# Patient Record
Sex: FEMALE | Race: WHITE | NOT HISPANIC OR LATINO | Employment: UNEMPLOYED | URBAN - METROPOLITAN AREA
[De-identification: names, ages, dates, MRNs, and addresses within clinical notes are randomized per-mention and may not be internally consistent; named-entity substitution may affect disease eponyms.]

---

## 2020-10-12 ENCOUNTER — OFFICE VISIT (OUTPATIENT)
Dept: ORTHOPEDICS | Facility: CLINIC | Age: 40
End: 2020-10-12
Payer: COMMERCIAL

## 2020-10-12 ENCOUNTER — ANCILLARY PROCEDURE (OUTPATIENT)
Dept: GENERAL RADIOLOGY | Facility: CLINIC | Age: 40
End: 2020-10-12
Attending: PEDIATRICS
Payer: COMMERCIAL

## 2020-10-12 VITALS
HEIGHT: 63 IN | WEIGHT: 110 LBS | DIASTOLIC BLOOD PRESSURE: 87 MMHG | HEART RATE: 105 BPM | BODY MASS INDEX: 19.49 KG/M2 | SYSTOLIC BLOOD PRESSURE: 124 MMHG

## 2020-10-12 DIAGNOSIS — M54.2 NECK PAIN: ICD-10-CM

## 2020-10-12 DIAGNOSIS — G25.89 SCAPULAR DYSKINESIS: ICD-10-CM

## 2020-10-12 DIAGNOSIS — M54.2 NECK PAIN: Primary | ICD-10-CM

## 2020-10-12 DIAGNOSIS — M75.101 ROTATOR CUFF SYNDROME OF RIGHT SHOULDER: ICD-10-CM

## 2020-10-12 PROCEDURE — 99203 OFFICE O/P NEW LOW 30 MIN: CPT | Performed by: PEDIATRICS

## 2020-10-12 PROCEDURE — 72040 X-RAY EXAM NECK SPINE 2-3 VW: CPT | Performed by: RADIOLOGY

## 2020-10-12 ASSESSMENT — MIFFLIN-ST. JEOR: SCORE: 1153.09

## 2020-10-12 NOTE — RESULT ENCOUNTER NOTE
These results were discussed during office visit.    Amairani Abbasi MD, CAQ  Primary Care Sports Medicine  [facility]
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
## 2020-10-12 NOTE — PATIENT INSTRUCTIONS
Plan:  - Today's Plan of Care:  Rehab: Physical Therapy: [facility] - [phone]    -We also discussed other future treatment options:  MRI images of (Neck v. Shoulder)    Follow Up: 6 - 8 weeks    If you have any further questions for your physician or physician s care team you can call [phone] and use option 3 to leave a voice message. Calls received during business hours will be returned same day.

## 2020-10-21 ENCOUNTER — THERAPY VISIT (OUTPATIENT)
Dept: PHYSICAL THERAPY | Facility: CLINIC | Age: 40
End: 2020-10-21
Payer: COMMERCIAL

## 2020-10-21 DIAGNOSIS — G25.89 SCAPULAR DYSKINESIS: ICD-10-CM

## 2020-10-21 DIAGNOSIS — M54.2 NECK PAIN: ICD-10-CM

## 2020-10-21 DIAGNOSIS — M75.101 ROTATOR CUFF SYNDROME OF RIGHT SHOULDER: ICD-10-CM

## 2020-10-21 PROCEDURE — 97035 APP MDLTY 1+ULTRASOUND EA 15: CPT | Mod: GP | Performed by: PHYSICAL THERAPIST

## 2020-10-21 PROCEDURE — 97162 PT EVAL MOD COMPLEX 30 MIN: CPT | Mod: GP | Performed by: PHYSICAL THERAPIST

## 2020-10-21 PROCEDURE — 97535 SELF CARE MNGMENT TRAINING: CPT | Mod: GP | Performed by: PHYSICAL THERAPIST

## 2020-10-21 PROCEDURE — 97110 THERAPEUTIC EXERCISES: CPT | Mod: GP | Performed by: PHYSICAL THERAPIST
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
## 2020-10-28 ENCOUNTER — THERAPY VISIT (OUTPATIENT)
Dept: PHYSICAL THERAPY | Facility: CLINIC | Age: 40
End: 2020-10-28
Payer: COMMERCIAL

## 2020-10-28 DIAGNOSIS — M54.2 NECK PAIN: ICD-10-CM

## 2020-10-28 PROCEDURE — 97035 APP MDLTY 1+ULTRASOUND EA 15: CPT | Mod: GP | Performed by: PHYSICAL THERAPIST

## 2020-10-28 PROCEDURE — 97110 THERAPEUTIC EXERCISES: CPT | Mod: GP | Performed by: PHYSICAL THERAPIST

## 2020-10-28 PROCEDURE — 97112 NEUROMUSCULAR REEDUCATION: CPT | Mod: GP | Performed by: PHYSICAL THERAPIST

## 2020-11-09 ENCOUNTER — THERAPY VISIT (OUTPATIENT)
Dept: PHYSICAL THERAPY | Facility: CLINIC | Age: 40
End: 2020-11-09
Payer: COMMERCIAL

## 2020-11-09 DIAGNOSIS — M54.2 NECK PAIN: ICD-10-CM

## 2020-11-09 PROCEDURE — 97110 THERAPEUTIC EXERCISES: CPT | Mod: GP | Performed by: PHYSICAL THERAPIST

## 2020-11-09 PROCEDURE — 97035 APP MDLTY 1+ULTRASOUND EA 15: CPT | Mod: GP | Performed by: PHYSICAL THERAPIST

## 2020-11-09 PROCEDURE — 97112 NEUROMUSCULAR REEDUCATION: CPT | Mod: GP | Performed by: PHYSICAL THERAPIST

## 2021-01-09 ENCOUNTER — HEALTH MAINTENANCE LETTER (OUTPATIENT)
Age: 41
End: 2021-01-09

## 2021-03-22 PROBLEM — M54.2 NECK PAIN: Status: RESOLVED | Noted: 2020-10-21 | Resolved: 2021-03-22

## 2021-03-22 NOTE — PROGRESS NOTES
Subjective:  HPI  Physical Exam                    Objective:  System    Physical Exam    General     ROS    Assessment/Plan:    DISCHARGE REPORT    Progress reporting period is from 10/21 to 11/09/2020.       SUBJECTIVE   Subjective: Doing better. Also workng with chiro and a massage therapist  .   Changes in function:  None  Adverse reaction to treatment or activity: None    OBJECTIVE    Objective: improved scapular control with right arm elevation and HEP     ASSESSMENT/PLAN  Updated problem list and treatment plan: Diagnosis 1:  Cervical pain  Pain -  self management, education and home program  Decreased ROM/flexibility - therapeutic exercise and home program  Impaired muscle performance - neuro re-education and home program  Decreased function - home program  Impaired posture - home program  STG/LTGs have been met or progress has been made towards goals:  None  Assessment of Progress: The patient has not returned to therapy. Current status is unknown.  Self Management Plans:  Patient has been instructed in a home treatment program.  Patient  has been instructed in self management of symptoms.    Jessica continues to require the following intervention to meet STG and LTG's:  PT intervention is no longer required to meet STG/LTG.    Recommendations:  This patient is ready to be discharged from therapy and continue their home treatment program.    Please refer to the daily flowsheet for treatment today, total treatment time and time spent performing 1:1 timed codes.
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
## 2021-06-03 ENCOUNTER — OFFICE VISIT (OUTPATIENT)
Dept: FAMILY MEDICINE | Facility: CLINIC | Age: 41
End: 2021-06-03
Payer: COMMERCIAL

## 2021-06-03 VITALS
TEMPERATURE: 98 F | HEIGHT: 63 IN | DIASTOLIC BLOOD PRESSURE: 80 MMHG | SYSTOLIC BLOOD PRESSURE: 117 MMHG | HEART RATE: 95 BPM | OXYGEN SATURATION: 100 % | WEIGHT: 108.1 LBS | RESPIRATION RATE: 16 BRPM | BODY MASS INDEX: 19.15 KG/M2

## 2021-06-03 DIAGNOSIS — Z00.00 ROUTINE GENERAL MEDICAL EXAMINATION AT A HEALTH CARE FACILITY: Primary | ICD-10-CM

## 2021-06-03 DIAGNOSIS — R06.83 SNORING: ICD-10-CM

## 2021-06-03 DIAGNOSIS — R53.83 FATIGUE, UNSPECIFIED TYPE: ICD-10-CM

## 2021-06-03 DIAGNOSIS — Z12.4 SCREENING FOR CERVICAL CANCER: ICD-10-CM

## 2021-06-03 DIAGNOSIS — F17.200 SMOKER: ICD-10-CM

## 2021-06-03 DIAGNOSIS — F41.9 ANXIETY: ICD-10-CM

## 2021-06-03 DIAGNOSIS — F32.1 MODERATE MAJOR DEPRESSION (H): ICD-10-CM

## 2021-06-03 LAB
ANION GAP SERPL CALCULATED.3IONS-SCNC: 6 MMOL/L (ref 3–14)
BASOPHILS # BLD AUTO: 0 10E9/L (ref 0–0.2)
BASOPHILS NFR BLD AUTO: 0.9 %
BUN SERPL-MCNC: 9 MG/DL (ref 7–30)
CALCIUM SERPL-MCNC: 9.1 MG/DL (ref 8.5–10.1)
CHLORIDE SERPL-SCNC: 105 MMOL/L (ref 94–109)
CO2 SERPL-SCNC: 27 MMOL/L (ref 20–32)
CREAT SERPL-MCNC: 0.82 MG/DL (ref 0.52–1.04)
DEPRECATED CALCIDIOL+CALCIFEROL SERPL-MC: 47 UG/L (ref 20–75)
DIFFERENTIAL METHOD BLD: NORMAL
EOSINOPHIL # BLD AUTO: 0.2 10E9/L (ref 0–0.7)
EOSINOPHIL NFR BLD AUTO: 3.7 %
ERYTHROCYTE [DISTWIDTH] IN BLOOD BY AUTOMATED COUNT: 12.2 % (ref 10–15)
FERRITIN SERPL-MCNC: 20 NG/ML (ref 12–150)
GFR SERPL CREATININE-BSD FRML MDRD: >90 ML/MIN/{1.73_M2}
GLUCOSE SERPL-MCNC: 93 MG/DL (ref 70–99)
HCT VFR BLD AUTO: 40.5 % (ref 35–47)
HGB BLD-MCNC: 13.6 G/DL (ref 11.7–15.7)
LYMPHOCYTES # BLD AUTO: 0.9 10E9/L (ref 0.8–5.3)
LYMPHOCYTES NFR BLD AUTO: 21.6 %
MCH RBC QN AUTO: 29.6 PG (ref 26.5–33)
MCHC RBC AUTO-ENTMCNC: 33.6 G/DL (ref 31.5–36.5)
MCV RBC AUTO: 88 FL (ref 78–100)
MONOCYTES # BLD AUTO: 0.4 10E9/L (ref 0–1.3)
MONOCYTES NFR BLD AUTO: 8.1 %
NEUTROPHILS # BLD AUTO: 2.8 10E9/L (ref 1.6–8.3)
NEUTROPHILS NFR BLD AUTO: 65.7 %
PLATELET # BLD AUTO: 204 10E9/L (ref 150–450)
POTASSIUM SERPL-SCNC: 3.6 MMOL/L (ref 3.4–5.3)
RBC # BLD AUTO: 4.59 10E12/L (ref 3.8–5.2)
SODIUM SERPL-SCNC: 138 MMOL/L (ref 133–144)
TSH SERPL DL<=0.005 MIU/L-ACNC: 1.35 MU/L (ref 0.4–4)
VIT B12 SERPL-MCNC: 424 PG/ML (ref 193–986)
WBC # BLD AUTO: 4.3 10E9/L (ref 4–11)

## 2021-06-03 PROCEDURE — 82306 VITAMIN D 25 HYDROXY: CPT | Performed by: FAMILY MEDICINE

## 2021-06-03 PROCEDURE — 82728 ASSAY OF FERRITIN: CPT | Performed by: FAMILY MEDICINE

## 2021-06-03 PROCEDURE — 82607 VITAMIN B-12: CPT | Performed by: FAMILY MEDICINE

## 2021-06-03 PROCEDURE — 90715 TDAP VACCINE 7 YRS/> IM: CPT | Performed by: FAMILY MEDICINE

## 2021-06-03 PROCEDURE — 85025 COMPLETE CBC W/AUTO DIFF WBC: CPT | Performed by: FAMILY MEDICINE

## 2021-06-03 PROCEDURE — 99385 PREV VISIT NEW AGE 18-39: CPT | Mod: 25 | Performed by: FAMILY MEDICINE

## 2021-06-03 PROCEDURE — 99214 OFFICE O/P EST MOD 30 MIN: CPT | Mod: 25 | Performed by: FAMILY MEDICINE

## 2021-06-03 PROCEDURE — 36415 COLL VENOUS BLD VENIPUNCTURE: CPT | Performed by: FAMILY MEDICINE

## 2021-06-03 PROCEDURE — 90471 IMMUNIZATION ADMIN: CPT | Performed by: FAMILY MEDICINE

## 2021-06-03 PROCEDURE — 84443 ASSAY THYROID STIM HORMONE: CPT | Performed by: FAMILY MEDICINE

## 2021-06-03 PROCEDURE — 80048 BASIC METABOLIC PNL TOTAL CA: CPT | Performed by: FAMILY MEDICINE

## 2021-06-03 RX ORDER — BUPROPION HYDROCHLORIDE 150 MG/1
TABLET, EXTENDED RELEASE ORAL
Qty: 60 TABLET | Refills: 0 | Status: SHIPPED | OUTPATIENT
Start: 2021-06-03 | End: 2021-07-01

## 2021-06-03 SDOH — HEALTH STABILITY: MENTAL HEALTH: HOW OFTEN DO YOU HAVE 6 OR MORE DRINKS ON ONE OCCASION?: NOT ASKED

## 2021-06-03 SDOH — HEALTH STABILITY: MENTAL HEALTH: HOW MANY STANDARD DRINKS CONTAINING ALCOHOL DO YOU HAVE ON A TYPICAL DAY?: NOT ASKED

## 2021-06-03 SDOH — HEALTH STABILITY: MENTAL HEALTH: HOW OFTEN DO YOU HAVE A DRINK CONTAINING ALCOHOL?: NOT ASKED

## 2021-06-03 ASSESSMENT — MIFFLIN-ST. JEOR: SCORE: 1139.47

## 2021-06-03 ASSESSMENT — ENCOUNTER SYMPTOMS
CHILLS: 0
DYSURIA: 0
ARTHRALGIAS: 1
WEAKNESS: 1
SHORTNESS OF BREATH: 0
PALPITATIONS: 1
JOINT SWELLING: 0
ABDOMINAL PAIN: 0
NAUSEA: 0
COUGH: 0
MYALGIAS: 1
PARESTHESIAS: 0
DIARRHEA: 0
HEMATOCHEZIA: 0
FREQUENCY: 0
HEADACHES: 1
NERVOUS/ANXIOUS: 1
FEVER: 0
HEMATURIA: 0
CONSTIPATION: 0
EYE PAIN: 0
DIZZINESS: 0
BREAST MASS: 0
HEARTBURN: 0
SORE THROAT: 0

## 2021-06-03 ASSESSMENT — PATIENT HEALTH QUESTIONNAIRE - PHQ9
SUM OF ALL RESPONSES TO PHQ QUESTIONS 1-9: 17
10. IF YOU CHECKED OFF ANY PROBLEMS, HOW DIFFICULT HAVE THESE PROBLEMS MADE IT FOR YOU TO DO YOUR WORK, TAKE CARE OF THINGS AT HOME, OR GET ALONG WITH OTHER PEOPLE: EXTREMELY DIFFICULT
SUM OF ALL RESPONSES TO PHQ QUESTIONS 1-9: 17

## 2021-06-03 NOTE — NURSING NOTE
Prior to immunization administration, verified patients identity using patient s name and date of birth. Please see Immunization Activity for additional information.     Screening Questionnaire for Adult Immunization    Are you sick today?   No   Do you have allergies to medications, food, a vaccine component or latex?   Yes   Have you ever had a serious reaction after receiving a vaccination?   No   Do you have a long-term health problem with heart, lung, kidney, or metabolic disease (e.g., diabetes), asthma, a blood disorder, no spleen, complement component deficiency, a cochlear implant, or a spinal fluid leak?  Are you on long-term aspirin therapy?   No   Do you have cancer, leukemia, HIV/AIDS, or any other immune system problem?   No   Do you have a parent, brother, or sister with an immune system problem?   No   In the past 3 months, have you taken medications that affect  your immune system, such as prednisone, other steroids, or anticancer drugs; drugs for the treatment of rheumatoid arthritis, Crohn s disease, or psoriasis; or have you had radiation treatments?   No   Have you had a seizure, or a brain or other nervous system problem?   No   During the past year, have you received a transfusion of blood or blood    products, or been given immune (gamma) globulin or antiviral drug?   No   For women: Are you pregnant or is there a chance you could become       pregnant during the next month?   No   Have you received any vaccinations in the past 4 weeks?   No     Immunization questionnaire answers were all negative.        Per orders of Dr. Alvarez, injection of TDAP given by Maeve Evans. Patient instructed to remain in clinic for 15 minutes afterwards, and to report any adverse reaction to me immediately.       Screening performed by Maeve Evans on 6/3/2021 at 11:28 AM.

## 2021-06-04 PROBLEM — F41.9 ANXIETY: Status: ACTIVE | Noted: 2021-06-04

## 2021-06-04 PROBLEM — R53.83 FATIGUE, UNSPECIFIED TYPE: Status: ACTIVE | Noted: 2021-06-04

## 2021-06-04 PROBLEM — F17.200 SMOKER: Status: ACTIVE | Noted: 2021-06-04

## 2021-06-04 PROBLEM — F32.1 MODERATE MAJOR DEPRESSION (H): Status: ACTIVE | Noted: 2021-06-04

## 2021-06-04 ASSESSMENT — PATIENT HEALTH QUESTIONNAIRE - PHQ9: SUM OF ALL RESPONSES TO PHQ QUESTIONS 1-9: 17

## 2021-06-06 ENCOUNTER — MYC MEDICAL ADVICE (OUTPATIENT)
Dept: FAMILY MEDICINE | Facility: CLINIC | Age: 41
End: 2021-06-06

## 2021-06-06 DIAGNOSIS — Z13.220 SCREENING CHOLESTEROL LEVEL: Primary | ICD-10-CM

## 2021-06-06 DIAGNOSIS — Z13.1 SCREENING FOR DIABETES MELLITUS: ICD-10-CM

## 2021-06-15 DIAGNOSIS — Z13.220 SCREENING CHOLESTEROL LEVEL: ICD-10-CM

## 2021-06-15 DIAGNOSIS — Z13.1 SCREENING FOR DIABETES MELLITUS: ICD-10-CM

## 2021-06-15 LAB
CHOLEST SERPL-MCNC: 168 MG/DL
GLUCOSE SERPL-MCNC: 92 MG/DL (ref 70–99)
HDLC SERPL-MCNC: 75 MG/DL
LDLC SERPL CALC-MCNC: 81 MG/DL
NONHDLC SERPL-MCNC: 93 MG/DL
TRIGL SERPL-MCNC: 58 MG/DL

## 2021-06-15 PROCEDURE — 80061 LIPID PANEL: CPT | Performed by: FAMILY MEDICINE

## 2021-06-15 PROCEDURE — 36415 COLL VENOUS BLD VENIPUNCTURE: CPT | Performed by: FAMILY MEDICINE

## 2021-06-15 PROCEDURE — 82947 ASSAY GLUCOSE BLOOD QUANT: CPT | Performed by: FAMILY MEDICINE

## 2021-07-01 ENCOUNTER — VIRTUAL VISIT (OUTPATIENT)
Dept: FAMILY MEDICINE | Facility: CLINIC | Age: 41
End: 2021-07-01
Payer: COMMERCIAL

## 2021-07-01 DIAGNOSIS — F41.9 ANXIETY: ICD-10-CM

## 2021-07-01 DIAGNOSIS — F32.1 MODERATE MAJOR DEPRESSION (H): Primary | ICD-10-CM

## 2021-07-01 DIAGNOSIS — R53.83 FATIGUE, UNSPECIFIED TYPE: ICD-10-CM

## 2021-07-01 PROCEDURE — 99214 OFFICE O/P EST MOD 30 MIN: CPT | Mod: GT | Performed by: FAMILY MEDICINE

## 2021-07-01 RX ORDER — BUPROPION HYDROCHLORIDE 150 MG/1
150 TABLET, EXTENDED RELEASE ORAL DAILY
Qty: 7 TABLET | Refills: 0 | Status: SHIPPED | OUTPATIENT
Start: 2021-07-01 | End: 2021-07-29

## 2021-07-01 RX ORDER — FLUOXETINE 10 MG/1
CAPSULE ORAL
Qty: 30 CAPSULE | Refills: 0 | Status: SHIPPED | OUTPATIENT
Start: 2021-07-01 | End: 2021-07-29

## 2021-07-01 ASSESSMENT — ANXIETY QUESTIONNAIRES
4. TROUBLE RELAXING: MORE THAN HALF THE DAYS
3. WORRYING TOO MUCH ABOUT DIFFERENT THINGS: MORE THAN HALF THE DAYS
GAD7 TOTAL SCORE: 13
7. FEELING AFRAID AS IF SOMETHING AWFUL MIGHT HAPPEN: SEVERAL DAYS
6. BECOMING EASILY ANNOYED OR IRRITABLE: MORE THAN HALF THE DAYS
GAD7 TOTAL SCORE: 13
1. FEELING NERVOUS, ANXIOUS, OR ON EDGE: MORE THAN HALF THE DAYS
7. FEELING AFRAID AS IF SOMETHING AWFUL MIGHT HAPPEN: SEVERAL DAYS
5. BEING SO RESTLESS THAT IT IS HARD TO SIT STILL: MORE THAN HALF THE DAYS
GAD7 TOTAL SCORE: 13
2. NOT BEING ABLE TO STOP OR CONTROL WORRYING: MORE THAN HALF THE DAYS

## 2021-07-01 ASSESSMENT — PATIENT HEALTH QUESTIONNAIRE - PHQ9
SUM OF ALL RESPONSES TO PHQ QUESTIONS 1-9: 18
SUM OF ALL RESPONSES TO PHQ QUESTIONS 1-9: 18
10. IF YOU CHECKED OFF ANY PROBLEMS, HOW DIFFICULT HAVE THESE PROBLEMS MADE IT FOR YOU TO DO YOUR WORK, TAKE CARE OF THINGS AT HOME, OR GET ALONG WITH OTHER PEOPLE: EXTREMELY DIFFICULT

## 2021-07-01 NOTE — PROGRESS NOTES
Jessica is a 40 year old who is being evaluated via a billable video visit.      How would you like to obtain your AVS? MyChart  If the video visit is dropped, the invitation should be resent by: Text to cell phone: see epic  Will anyone else be joining your video visit? No    Video Start Time: 1:02 PM    Assessment & Plan     Moderate major depression (H)  Anxiety  No response to wellbutrin and possible worsening of sxs vs just increased situational stressors. rec wean off of wellbutrin and trial fluoxetine as alternative.   Reviewed onset of action of meds, common side effects and plan for close f/u. Encouraged call to clinic if symptoms worsening or adverse reactions.   - FLUoxetine (PROZAC) 10 MG capsule; Take 1 capsule (10 mg) by mouth daily for 14 days, THEN 2 capsules (20 mg) daily.  - FLUoxetine (PROZAC) 20 MG capsule; Take 1 capsule (20 mg) by mouth daily  - buPROPion (WELLBUTRIN SR) 150 MG 12 hr tablet; Take 1 tablet (150 mg) by mouth daily for 7 days And then stop medication as directed    Fatigue, unspecified type  Ongoing. Has sleep eval upcoming soon. ? Of narcolepsy or other sleep disorder present.   - FLUoxetine (PROZAC) 10 MG capsule; Take 1 capsule (10 mg) by mouth daily for 14 days, THEN 2 capsules (20 mg) daily.  - FLUoxetine (PROZAC) 20 MG capsule; Take 1 capsule (20 mg) by mouth daily  - buPROPion (WELLBUTRIN SR) 150 MG 12 hr tablet; Take 1 tablet (150 mg) by mouth daily for 7 days And then stop medication as directed      Patient Instructions   Decrease bupropion to once daily for one week and then stop.     After a few days of the above, it would be okay to start the fluoxetine 10 mg daily. If tolerating, you may increase to 20 mg dose after 2 weeks.     Follow-up with me in 4-6 weeks.           Return in about 4 weeks (around 7/29/2021).    Liberty Alvarez MD  [facility]    Jett Lopez is a 40 year old who presents for the following health issues      History of Present Illness       Mental Health Follow-up:  Patient presents to follow-up on Depression & Anxiety.Patient's depression since last visit has been:  Bad  The patient is having other symptoms associated with depression.  Patient's anxiety since last visit has been:  No change  The patient is having other symptoms associated with anxiety.  Any significant life events: housing concerns, health concerns and other  Patient is feeling anxious or having panic attacks.  Patient has no concerns about alcohol or drug use.     Social History  Tobacco Use    Smoking status: Current Some Day Smoker      Packs/day: 0.15      Years: 20.00      Pack years: 3      Types: Cigarettes      Start date: 6/2/1998    Smokeless tobacco: Never Used  Alcohol use: Yes    Comment: 3-4 drinks per week  Drug use: Not Currently      Today's PHQ-9         PHQ-9 Total Score:     (P) 18   PHQ-9 Q9 Thoughts of better off dead/self-harm past 2 weeks :   (P) Not at all   Thoughts of suicide or self harm:      Self-harm Plan:        Self-harm Action:          Safety concerns for self or others:           She eats 2-3 servings of fruits and vegetables daily.She consumes 0 sweetened beverage(s) daily.She exercises with enough effort to increase her heart rate 30 to 60 minutes per day.  She exercises with enough effort to increase her heart rate 5 days per week.   She is taking medications regularly.     Not doing as good as she had hoped   Can't tell if medicine is impacting anything  Traveling a lot. [place] of [place]     Taking buprion bid.   Did have some decrease in appetite and thinks that she ate less than she usually does and some increase in  dreams  Some night wakening at 2 or 3  (previoulsly just occasionally)     Has sleep clinic [facility] on July 9th, virtual visit.   Leaning towards sx's being more sleep related.     Nodding off during the day.       PHQ-9 SCORE  6/3/2021 7/1/2021   PHQ-9 Total Score MyChart 17 (Moderately severe depression) 18 (Moderately severe depression)   PHQ-9 Total Score 17 18     BYRON-7 SCORE 7/1/2021   Total Score 13 (moderate anxiety)   Total Score 13               Objective           Vitals:  No vitals were obtained today due to virtual visit.    Physical Exam   GENERAL: Healthy, alert and no distress  EYES: Eyes grossly normal to inspection.  No discharge or erythema, or obvious scleral/conjunctival abnormalities.  RESP: No audible wheeze, cough, or visible cyanosis.  No visible retractions or increased work of breathing.    SKIN: Visible skin clear. No significant rash, abnormal pigmentation or lesions.  NEURO: Cranial nerves grossly intact.  Mentation and speech appropriate for age.  PSYCH: Mentation appears normal, affect normal/bright, judgement and insight intact, normal speech and appearance well-groomed.          Video-Visit Details    Type of service:  Video Visit    Video End Time:1:20 PM    Originating Location (pt. Location): Home    Distant Location (provider location):  [facility]     Platform used for Video Visit: [employer] (formerly OpenERP)  Answers for HPI/ROS submitted by the patient on 7/1/2021   Chronic problems general questions HPI Form  If you checked off any problems, how difficult have these problems made it for you to do your work, take care of things at home, or get along with other people?: Extremely difficult  PHQ9 TOTAL SCORE: 18  BYRON 7 TOTAL SCORE: 13

## 2021-07-01 NOTE — PATIENT INSTRUCTIONS
Decrease bupropion to once daily for one week and then stop.     After a few days of the above, it would be okay to start the fluoxetine 10 mg daily. If tolerating, you may increase to 20 mg dose after 2 weeks.     Follow-up with me in 4-6 weeks.

## 2021-07-02 ASSESSMENT — PATIENT HEALTH QUESTIONNAIRE - PHQ9: SUM OF ALL RESPONSES TO PHQ QUESTIONS 1-9: 18

## 2021-07-02 ASSESSMENT — ANXIETY QUESTIONNAIRES: GAD7 TOTAL SCORE: 13

## 2021-07-08 ASSESSMENT — ENCOUNTER SYMPTOMS
DECREASED CONCENTRATION: 1
NERVOUS/ANXIOUS: 1
ALTERED TEMPERATURE REGULATION: 1
DECREASED APPETITE: 1
ARTHRALGIAS: 1
NIGHT SWEATS: 1
PANIC: 1
DEPRESSION: 1
MEMORY LOSS: 1
MYALGIAS: 1
PALPITATIONS: 1
LIGHT-HEADEDNESS: 1
BACK PAIN: 1
INSOMNIA: 1
INCREASED ENERGY: 1
MUSCLE WEAKNESS: 1
NECK PAIN: 1
FATIGUE: 1
WEAKNESS: 1

## 2021-07-08 ASSESSMENT — SLEEP AND FATIGUE QUESTIONNAIRES
HOW LIKELY ARE YOU TO NOD OFF OR FALL ASLEEP WHEN YOU ARE A PASSENGER IN A CAR FOR AN HOUR WITHOUT A BREAK: SLIGHT CHANCE OF DOZING
HOW LIKELY ARE YOU TO NOD OFF OR FALL ASLEEP WHILE SITTING AND TALKING TO SOMEONE: WOULD NEVER DOZE
HOW LIKELY ARE YOU TO NOD OFF OR FALL ASLEEP IN A CAR, WHILE STOPPED FOR A FEW MINUTES IN TRAFFIC: WOULD NEVER DOZE
HOW LIKELY ARE YOU TO NOD OFF OR FALL ASLEEP WHILE SITTING QUIETLY AFTER LUNCH WITHOUT ALCOHOL: HIGH CHANCE OF DOZING
HOW LIKELY ARE YOU TO NOD OFF OR FALL ASLEEP WHILE SITTING AND READING: MODERATE CHANCE OF DOZING
HOW LIKELY ARE YOU TO NOD OFF OR FALL ASLEEP WHILE SITTING INACTIVE IN A PUBLIC PLACE: WOULD NEVER DOZE
HOW LIKELY ARE YOU TO NOD OFF OR FALL ASLEEP WHILE LYING DOWN TO REST IN THE AFTERNOON WHEN CIRCUMSTANCES PERMIT: HIGH CHANCE OF DOZING
HOW LIKELY ARE YOU TO NOD OFF OR FALL ASLEEP WHILE WATCHING TV: SLIGHT CHANCE OF DOZING

## 2021-07-08 NOTE — PROGRESS NOTES
Jessica Mello is a 40 year old who is being evaluated via a billable video visit.      How would you like to obtain your AVS? MyChart  If the video visit is dropped, the invitation should be resent by: Text to cell phone: [phone]  Will anyone else be joining your video visit? No    Does patient have any form of state insurance? Yes  Do you have wifi? Yes  Do you have a smart phone? Yes  Can you download an marv on your phone comfortably with out assistance? Yes  Can you watch a [employer] video? Yes        Orlando Hanson MA    Video Start Time: 1:00 PM    Video-Visit Details    Type of service:  Video Visit    Video End Time:1:30 PM    Originating Location (pt. Location): Home    Distant Location (provider location):  [email or web address]@     Platform used for Video Visit: Feliberto  Sleep Consultation:    Date on this visit: 7/9/2021    Jessica Mello is sent by Liberty Michael* for a sleep consultation regarding excessive daytime sleepiness.    Primary Physician: No Ref-Primary, Physician     Jessica Mello reports excessive daytime sleepiness for last 4 years.     Her medical history is significant for major depressive disorder.  Patient reports that depression worsened over the last 1 year.  She was started on bupropion which apparently did not help despite dosing up to 300 mg.  She is now in the process of being switched to fluoxetine.    Patient reports being extremely tired. She struggles to stay focused and alert when sitting at her work desk. She dozes off occasionally during the day.     Jessica does not know if she snores. Patient does not have a regular bed partner. There is not report of gasping and choking.  She does not have witnessed apneas. Patient sleeps on her back and side. She denies no morning headaches or restless legs. Jessica has frequent sleep talking and denies any bruxism, sleep walking, dream enactment.     Patient reports that she has experienced occasional  hypnagogic visual and auditory hallucinations.     Patient has a history of shiftwork in the past.  At baseline, she may have a delayed sleep phase circadian rhythm.  Currently, Jessica goes to sleep at 10:00 PM during the week. She wakes up at 7:00 AM without an alarm. She falls asleep in 10 minutes.  Jessica denies difficulty falling asleep.  She wakes up 1 times a night for 15 minutes before falling back to sleep.  Jessica wakes up to uncertain reasons.  On weekends, Jessica goes to sleep at 11:00 PM.  She wakes up at 7:00 AM without an alarm. She falls asleep in 10 minutes.  Patient gets an average of 7 hours of sleep per night.     Patient's [place] Sleepiness score 10/24 consistent with mild daytime sleepiness.      Jessica naps 1-2 times per week for 20-30 minutes, feels refreshed after naps. She takes some inadvertant naps.  She denies closing eyes, dozing and falling asleep while driving. Patient was counseled on the importance of driving while alert, to pull over if drowsy, or nap before getting into the vehicle if sleepy.      She uses 2-3 cups/day of coffee. Last caffeine intake is usually before noon.    Allergies:    Allergies   Allergen Reactions     Penicillins Hives       Medications:    Current Outpatient Medications   Medication Sig Dispense Refill     FLUoxetine (PROZAC) 10 MG capsule Take 1 capsule (10 mg) by mouth daily for 14 days, THEN 2 capsules (20 mg) daily. 30 capsule 0     FLUoxetine (PROZAC) 20 MG capsule Take 1 capsule (20 mg) by mouth daily 30 capsule 0     VITAMIN D PO Take 2,000 Int'l Units by mouth daily       buPROPion (WELLBUTRIN SR) 150 MG 12 hr tablet Take 1 tablet (150 mg) by mouth daily for 7 days And then stop medication as directed (Patient not taking: Reported on 7/8/2021) 7 tablet 0       Problem List:  Patient Active Problem List    Diagnosis Date Noted     Smoker 06/04/2021     Priority: Medium     Moderate major depression (H) 06/04/2021     Priority: Medium      Anxiety 06/04/2021     Priority: Medium     Fatigue, unspecified type 06/04/2021     Priority: Medium        Past Medical/Surgical History:  No past medical history on file.  Past Surgical History:   Procedure Laterality Date     NO HISTORY OF SURGERY         Social History:  Social History     Socioeconomic History     Marital status: Single     Spouse name: Not on file     Number of children: Not on file     Years of education: Not on file     Highest education level: Not on file   Occupational History     Not on file   Social Needs     Financial resource strain: Not on file     Food insecurity     Worry: Not on file     Inability: Not on file     Transportation needs     Medical: Not on file     Non-medical: Not on file   Tobacco Use     Smoking status: Current Some Day Smoker     Packs/day: 0.15     Years: 20.00     Pack years: 3.00     Types: Cigarettes     Start date: 6/2/1998     Smokeless tobacco: Never Used   Substance and Sexual Activity     Alcohol use: Yes     Comment: 3-4 drinks per week     Drug use: Not Currently     Sexual activity: Not Currently   Lifestyle     Physical activity     Days per week: Not on file     Minutes per session: Not on file     Stress: Not on file   Relationships     Social connections     Talks on phone: Not on file     Gets together: Not on file     Attends Temple service: Not on file     Active member of club or organization: Not on file     Attends meetings of clubs or organizations: Not on file     Relationship status: Not on file     Intimate partner violence     Fear of current or ex partner: Not on file     Emotionally abused: Not on file     Physically abused: Not on file     Forced sexual activity: Not on file   Other Topics Concern     Parent/sibling w/ CABG, MI or angioplasty before 65F 55M? No   Social History Narrative     Not on file       Family History:  Family History   Problem Relation Age of Onset     Hypertension Mother      Breast Cancer Mother 65      Hyperlipidemia Father      Depression Sister      Anxiety Disorder Sister      Thyroid Disease Sister      Narcolepsy Sister      Thyroid Disease Maternal Grandmother      Depression Sister      Attention Deficit Disorder Sister      Colon Cancer Paternal Uncle         dx after age 50     Sister is diagnosed with narcolepsy.     Review of Systems:  A complete review of systems reviewed by me is negative with the exeption of what has been mentioned in the history of present illness.    Physical Examination:  Vitals: There were no vitals taken for this visit.  BMI= There is no height or weight on file to calculate BMI.         [place] Total Score 7/8/2021   Total score - [place] 10          GENERAL APPEARANCE: healthy, alert and no distress  RESP: Negative for cough or dyspnea  NEURO: mentation intact and speech normal  PSYCH: mentation appears normal and affect normal/bright    Impression/Plan:    1. Hypersomnia     Patient is a 40 years old female, with medical history significant for major depressive disorder, who presents with a history of excessive daytime fatigue and sleepiness for the last few years.  She reports that depression is worse over the last 1 year which coincided with worsening of her daytime fatigue.  Patient's sister is diagnosed with narcolepsy.  She denies a history of cataplexy or sleep paralysis but has occasionally experienced hypnagogic hallucinations.  Although primary care notes a history of snoring, patient states that she does not know if she snores.  Currently, she is being switched from bupropion to fluoxetine due to lack of benefit from bupropion.     Patient's history is not typical of narcolepsy.  Although a central disorder of hypersomnia will remain a consideration, due to worsening depression, we have to see if her fatigue will improve with antidepressant treatment.  Optimization of antidepressant dosing and adequate opportunity for treatment is advised.    An accurate work-up for  narcolepsy will have to involve actigraphy, PSG and MSLT with at least a 2-week period of discontinuation of SSRI or SNRI therapy. (diagnostic finding of sleep onset REM in narcolepsy can be suppressed by antidepressant therapy). Fluoxetine will have to be held for 4 to 5 weeks due to longer half-life of its active metabolite.  After review of details of testing, we decided to allow adequate time for antidepressant therapy and consider testing if her symptoms remain.     In the meantime, I want to make sure that there is no significant sleep disordered breathing.  An overnight sleep study was recommended.    Plan:     - Home sleep apnea testing     She will follow up with me in approximately two weeks after her sleep study has been competed to review the results and discuss plan of care.       Polysomnography & HST reviewed.  Obstructive sleep apnea reviewed.  Complications of untreated sleep apnea were reviewed.    I spent a total of 50 minutes for this appointment today which include time spent before, during and after the visit for patient care, counseling and coordination of care.    Dr. Porfirio Hewitt     CC: Liberty Michael*      Answers for HPI/ROS submitted by the patient on 7/8/2021   General Symptoms: Yes  Skin Symptoms: No  HENT Symptoms: Yes  EYE SYMPTOMS: No  HEART SYMPTOMS: Yes  LUNG SYMPTOMS: No  INTESTINAL SYMPTOMS: No  URINARY SYMPTOMS: Yes  GYNECOLOGIC SYMPTOMS: No  BREAST SYMPTOMS: No  SKELETAL SYMPTOMS: Yes  BLOOD SYMPTOMS: No  NERVOUS SYSTEM SYMPTOMS: Yes  MENTAL HEALTH SYMPTOMS: Yes  Loss of appetite: Yes  Fatigue: Yes  Night sweats: Yes  Increased stress: Yes  Feeling hot or cold when others believe the temperature is normal: Yes  Change in or Loss of Energy: Yes  Confusion: Yes  Tinnitus: Yes  Dry mouth: Yes  Fast or irregular heartbeat: Yes  Light-headedness: Yes  Increased frequency of urination: Yes  Back pain: Yes  Muscle aches: Yes  Neck pain: Yes  Joint pain: Yes  Muscle weakness:  Yes  Memory loss: Yes  Weakness: Yes  Nervous or Anxious: Yes  Depression: Yes  Trouble sleeping: Yes  Trouble thinking or concentrating: Yes  Mood changes: Yes  Panic attacks: Yes

## 2021-07-09 ENCOUNTER — VIRTUAL VISIT (OUTPATIENT)
Dept: SLEEP MEDICINE | Facility: CLINIC | Age: 41
End: 2021-07-09
Attending: FAMILY MEDICINE
Payer: COMMERCIAL

## 2021-07-09 DIAGNOSIS — R53.83 FATIGUE, UNSPECIFIED TYPE: Primary | ICD-10-CM

## 2021-07-09 DIAGNOSIS — R06.83 SNORING: ICD-10-CM

## 2021-07-09 PROCEDURE — 99204 OFFICE O/P NEW MOD 45 MIN: CPT | Mod: GT | Performed by: INTERNAL MEDICINE

## 2021-07-09 NOTE — PATIENT INSTRUCTIONS
Home sleep apnea testing     Instructions for treating Delayed Sleep Phase Syndrome:    Delayed Sleep Phase Syndrome (DSPS) means that your body's internal timing is set late compared to the 24 hour day. Therefore, it is often difficult to get up on time for work in the morning and sometimes difficult to fall asleep on time, in order to get enough sleep. People with DSPS often tend to like to stay up late on weekends and sleep in until between 10 AM and noon, sometimes even later.This is actually a bad habit that will perpetuate the problem. It reinforces your body's tendency to be on that later schedule.    You should go to bed when you are sleepy and ready to sleep. During this entire process, you should not engage in activities that may make it worse, such as watching TV in bed, leaving the TV on all night, drinking any caffeine 6 hours before bed or exercising 1-2 hours before bed.     Start taking Melatonin, 1 mg tablet 5 hours before the time that you fall asleep on average (not your desired bedtime or time that you get in bed, but the time you normally fall asleep on your own).     Upon awakening, get exposure to sun-light for about 30-45 minutes. You do not need to look at the sun, in fact, this is dangerous. Reading the paper with the sun shining on you is adequate.  Alternatively, you may use a Seasonal Affective Disorder Lamp (intensity 10,000 Lux) instead of the sun. The lamp should be positioned 1-2 arms lengths away from you. They lamps are sold at Home Medical Companies such as [employer], [employer] or [employer]. A prescription can be written to get insurance coverage in some cases. They are also sold on [employer].    Using the light and melatonin should help march your internal clock (known as your circadian rhythms) gradually earlier. As your bedtime advances, remember to take your melatonin earlier, keeping it 5 hours before your fall asleep time.    Avoid naps and sleeping in because  sleeping during the day will delay your body's clock and you will have to start from scratch.     More information about light therapy:  If you have any concerns regarding the safety of bright light therapy for you, it is recommended that you consult an ophthalmologist before using a light box.  If you have a condition that makes your eyes very sensitive to light, macular degeneration, a family history of such problems, or diabetic changes to your eyes, consult an ophthalmologist before using a light box. If you have anxiety disorder and have an increase in anxiety discontinue use.

## 2021-07-12 NOTE — PROGRESS NOTES
Jessica has been scheduled for HST 8/17/21 () location.   Follow up visit is scheduled 9/9/21 with Dr. Hewitt as in person visit () location.     Instructional letter sent via [employer].     Diane TILLEY CMA, SLEEP MEDICINE, 7/12/2021 1:25 PM

## 2021-07-28 ASSESSMENT — ANXIETY QUESTIONNAIRES
GAD7 TOTAL SCORE: 10
2. NOT BEING ABLE TO STOP OR CONTROL WORRYING: MORE THAN HALF THE DAYS
8. IF YOU CHECKED OFF ANY PROBLEMS, HOW DIFFICULT HAVE THESE MADE IT FOR YOU TO DO YOUR WORK, TAKE CARE OF THINGS AT HOME, OR GET ALONG WITH OTHER PEOPLE?: SOMEWHAT DIFFICULT
GAD7 TOTAL SCORE: 10
6. BECOMING EASILY ANNOYED OR IRRITABLE: SEVERAL DAYS
5. BEING SO RESTLESS THAT IT IS HARD TO SIT STILL: SEVERAL DAYS
7. FEELING AFRAID AS IF SOMETHING AWFUL MIGHT HAPPEN: SEVERAL DAYS
1. FEELING NERVOUS, ANXIOUS, OR ON EDGE: MORE THAN HALF THE DAYS
GAD7 TOTAL SCORE: 10
7. FEELING AFRAID AS IF SOMETHING AWFUL MIGHT HAPPEN: SEVERAL DAYS
4. TROUBLE RELAXING: SEVERAL DAYS
3. WORRYING TOO MUCH ABOUT DIFFERENT THINGS: MORE THAN HALF THE DAYS

## 2021-07-28 ASSESSMENT — PATIENT HEALTH QUESTIONNAIRE - PHQ9
SUM OF ALL RESPONSES TO PHQ QUESTIONS 1-9: 15
SUM OF ALL RESPONSES TO PHQ QUESTIONS 1-9: 15
10. IF YOU CHECKED OFF ANY PROBLEMS, HOW DIFFICULT HAVE THESE PROBLEMS MADE IT FOR YOU TO DO YOUR WORK, TAKE CARE OF THINGS AT HOME, OR GET ALONG WITH OTHER PEOPLE: EXTREMELY DIFFICULT

## 2021-07-29 ENCOUNTER — VIRTUAL VISIT (OUTPATIENT)
Dept: FAMILY MEDICINE | Facility: CLINIC | Age: 41
End: 2021-07-29
Payer: COMMERCIAL

## 2021-07-29 DIAGNOSIS — R53.83 FATIGUE, UNSPECIFIED TYPE: ICD-10-CM

## 2021-07-29 DIAGNOSIS — F32.1 MODERATE MAJOR DEPRESSION (H): ICD-10-CM

## 2021-07-29 DIAGNOSIS — F41.9 ANXIETY: ICD-10-CM

## 2021-07-29 PROCEDURE — 99213 OFFICE O/P EST LOW 20 MIN: CPT | Mod: GT | Performed by: FAMILY MEDICINE

## 2021-07-29 ASSESSMENT — ANXIETY QUESTIONNAIRES: GAD7 TOTAL SCORE: 10

## 2021-07-29 ASSESSMENT — PATIENT HEALTH QUESTIONNAIRE - PHQ9: SUM OF ALL RESPONSES TO PHQ QUESTIONS 1-9: 15

## 2021-07-29 NOTE — PROGRESS NOTES
Jessica is a 40 year old who is being evaluated via a billable video visit.      How would you like to obtain your AVS? MyChart  If the video visit is dropped, the invitation should be resent by: Text to cell phone: see epic  Will anyone else be joining your video visit? No    Video Start Time: 1:48 PM    Assessment & Plan     Moderate major depression (H)  Thankfully, this is significantly improved per patient report.  Interestingly, her PHQ-9 score is still slightly elevated.  This could be in part due to her fatigue driven symptoms but I do wonder if she would still benefit from a higher fluoxetine dose.  We are still quite early in the 20 mg dose (she has only been on this level for a week and a half) so we will give her a bit more time here.  If things do not significantly improve would push the dose a little further up to 40 mg and she can message me for this.  - FLUoxetine (PROZAC) 20 MG capsule; Take 1 capsule (20 mg) by mouth daily    Anxiety  Improving but not yet controlled.  See plan as above  - FLUoxetine (PROZAC) 20 MG capsule; Take 1 capsule (20 mg) by mouth daily    Fatigue, unspecified type  Has had visit with sleep clinic and will be getting sleep study done for sleep apnea.  Unfortunately would need to be off the SSRI for a sleep study for the narcolepsy.  Per the sleep clinic note it was thought unlikely that she had narcolepsy and that her fatigue was due to depression.  We reviewed this dilemma of testing together.  We discussed to maximize depression treatment before holding the medication for the narcolepsy study.  Could consider trazodone as a sleep aid in the future as I do wonder how much incomplete sleep is also contributing.  - FLUoxetine (PROZAC) 20 MG capsule; Take 1 capsule (20 mg) by mouth daily       Patient Instructions   Continue fluoxetine 20 mg. Consider increasing to 40 mg in the next 2-4 weeks if persisting symptoms. Message me if desiring to increase dose or go off for the  sleep study.           Return (In 6 weeks if fluoxetine dose is increased or in 6 months if doing well with current medications).    Liberty Alvarez MD  [facility] MADHAVI Lopez is a 40 year old who presents for the following health issues     History of Present Illness       Mental Health Follow-up:  Patient presents to follow-up on Depression & Anxiety.Patient's depression since last visit has been:  Good  The patient is having other symptoms associated with depression.  Patient's anxiety since last visit has been:  Better  The patient is having other symptoms associated with anxiety.  Any significant life events: health concerns  Patient is feeling anxious or having panic attacks.  Patient has no concerns about alcohol or drug use.     Social History  Tobacco Use    Smoking status: Current Some Day Smoker      Packs/day: 0.15      Years: 20.00      Pack years: 3      Types: Cigarettes      Start date: 6/2/1998    Smokeless tobacco: Never Used  Alcohol use: Yes    Comment: 3-4 drinks per week  Drug use: Not Currently      Today's PHQ-9         PHQ-9 Total Score:     (P) 15   PHQ-9 Q9 Thoughts of better off dead/self-harm past 2 weeks :   (P) Not at all   Thoughts of suicide or self harm:      Self-harm Plan:        Self-harm Action:          Safety concerns for self or others:           She eats 2-3 servings of fruits and vegetables daily.She consumes 0 sweetened beverage(s) daily.She exercises with enough effort to increase her heart rate 20 to 29 minutes per day.  She exercises with enough effort to increase her heart rate 4 days per week.   She is taking medications regularly.     Feel better on the fluoxetine than the wellbutrin. Feel more stable and not as down.   Still tired but no longer sad about it.   No improvement in energy  Will be completing home sleep test through sleep clinic for eval of sleep apnea but unable to complee narcolepsy study unless she is  off the fluoxetine for 4 weeks.     Feel about 80-90% better for her mood/depression.   Anxiety- little better. About 50%  No AE with the fluoxetine.     Sleep is still the same. Some night wakening and early morning wakening.     PHQ-9 SCORE 6/3/2021 7/1/2021 7/28/2021   PHQ-9 Total Score MyChart 17 (Moderately severe depression) 18 (Moderately severe depression) 15 (Moderately severe depression)   PHQ-9 Total Score 17 18 15     BYRON-7 SCORE 7/1/2021 7/28/2021   Total Score 13 (moderate anxiety) 10 (moderate anxiety)   Total Score 13 10             Objective           Vitals:  No vitals were obtained today due to virtual visit.    Physical Exam   GENERAL: Healthy, alert and no distress  EYES: Eyes grossly normal to inspection.  No discharge or erythema, or obvious scleral/conjunctival abnormalities.  RESP: No audible wheeze, cough, or visible cyanosis.  No visible retractions or increased work of breathing.    SKIN: Visible skin clear. No significant rash, abnormal pigmentation or lesions.  NEURO: Cranial nerves grossly intact.  Mentation and speech appropriate for age.  PSYCH: Mentation appears normal, affect normal/bright, judgement and insight intact, normal speech and appearance well-groomed.            Video-Visit Details    Type of service:  Video Visit    Video End Time:2:05 PM    Originating Location (pt. Location): Home    Distant Location (provider location):  [facility]     Platform used for Video Visit: uShip  Answers for HPI/ROS submitted by the patient on 7/28/2021  If you checked off any problems, how difficult have these problems made it for you to do your work, take care of things at home, or get along with other people?: Extremely difficult  PHQ9 TOTAL SCORE: 15  BYRON 7 TOTAL SCORE: 10

## 2021-07-29 NOTE — PATIENT INSTRUCTIONS
Continue fluoxetine 20 mg. Consider increasing to 40 mg in the next 2-4 weeks if persisting symptoms. Message me if desiring to increase dose or go off for the sleep study.

## 2021-08-17 ENCOUNTER — OFFICE VISIT (OUTPATIENT)
Dept: SLEEP MEDICINE | Facility: CLINIC | Age: 41
End: 2021-08-17
Payer: COMMERCIAL

## 2021-08-17 DIAGNOSIS — R53.83 FATIGUE, UNSPECIFIED TYPE: ICD-10-CM

## 2021-08-17 PROCEDURE — G0399 HOME SLEEP TEST/TYPE 3 PORTA: HCPCS | Performed by: INTERNAL MEDICINE

## 2021-08-18 ENCOUNTER — APPOINTMENT (OUTPATIENT)
Dept: SLEEP MEDICINE | Facility: CLINIC | Age: 41
End: 2021-08-18
Payer: COMMERCIAL

## 2021-08-18 NOTE — PROGRESS NOTES
HST POST-STUDY QUESTIONNAIRE    1. What time did you go to bed?  2115  2. How long do you think it took to fall asleep?  30-60 MINUTES  3. What time did you wake up to start the day?  0615  4. Did you get up during the night at all?  YES  5. If you woke up, do you remember approximately what time(s)? 0130, 0300, 0445  6. Did you have any difficulty with the equipment?  Yes belt was a bit tight for comfort-maybe reccommend size up for sensitive types  7. Did you us any type of treatment with this study?  None  8. Was the head of the bed elevated? No  9. Did you sleep in a recliner?  No  10. Did you stop using CPAP at least 3 days before this test?  NA  11. Any other information you'd like us to know? no

## 2021-08-18 NOTE — PROGRESS NOTES
This HSAT was performed using a Noxturnal T3 device which recorded snore, sound, movement activity, body position, nasal pressure, oronasal thermal airflow, pulse, oximetry and both chest and abdominal respiratory effort. HSAT data was restricted to the time patient states they were in bed.     HSAT was scored using 1B 4% hypopnea rule.     HST AHI (Non-PAT): 1.7  Snoring was reported as intermittent.  Time with SpO2 below 89% was 0.2 minutes.   Overall signal quality was fair     Pt will follow up with sleep provider to determine appropriate therapy.

## 2021-08-22 NOTE — PROCEDURES
"HOME SLEEP STUDY INTERPRETATION    Patient: Jessica Mello  MRN: [id number]  YOB: 1980  Study Date: 8/17/2021  Referring Provider: No Ref-Primary, Physician;   Ordering Provider: Porfirio Hewitt MD, MD     Indications for Home Study: Jessica Mello is a 40 year old female with a history of depression who presents with symptoms suggestive of obstructive sleep apnea.    Estimated body mass index is 19.15 kg/m  as calculated from the following:    Height as of 6/3/21: 1.6 m (5' 3\").    Weight as of 6/3/21: 49 kg (108 lb 1.6 oz).  Total score - [place]: 10 (7/8/2021  3:38 PM)  STOP-BANG: -/8    Data: A full night home sleep study was performed recording the standard physiologic parameters including body position, movement, sound, nasal pressure, thermal oral airflow, chest and abdominal movements with respiratory inductance plethysmography, and oxygen saturation by pulse oximetry. Pulse rate was estimated by oximetry recording. This study was considered adequate based on > 4 hours of quality oximetry and respiratory recording. As specified by the AASM Manual for the Scoring of Sleep and Associated events, version 2.3, Rule VIII.D 1B, 4% oxygen desaturation scoring for hypopneas is used as a standard of care on all home sleep apnea testing.    Analysis Time:  489.5 minutes    Respiration:   Sleep Associated Hypoxemia: sustained hypoxemia was not present. Baseline oxygen saturation was 96%.  Time with saturation less than or equal to 88% was 0.2 minutes. The lowest oxygen saturation was 79%.   Snoring: Snoring was absent.  Respiratory events: The home study revealed a presence of 2 obstructive apneas and 12 mixed and central apneas. There were 0 hypopneas resulting in a combined apnea/hypopnea index [AHI] of 1.7 events per hour.  AHI was 1.8 per hour supine, 0 per hour prone, 0.6 per hour on left side, and 3.1 per hour on right side.   Pattern: Excluding events noted above, respiratory rate and " pattern was Normal.    Position: Percent of time spent: supine - 67%, prone - 0%, on left - 20.7%, on right - 12%.    Heart Rate: By pulse oximetry normal rate was noted.     Assessment:   Negative for sleep apnea.  Sleep associated hypoxemia was not present.    Diagnosis Code(s): Chronic fatigue     Porfirio Hewitt MD, August 21, 2021   Diplomate, American Board of Psychiatry and Neurology, Sleep Medicine

## 2021-08-26 ENCOUNTER — MYC MEDICAL ADVICE (OUTPATIENT)
Dept: FAMILY MEDICINE | Facility: CLINIC | Age: 41
End: 2021-08-26

## 2021-08-26 DIAGNOSIS — F32.1 MODERATE MAJOR DEPRESSION (H): Primary | ICD-10-CM

## 2021-08-26 DIAGNOSIS — F41.9 ANXIETY: ICD-10-CM

## 2021-08-26 RX ORDER — FLUOXETINE 40 MG/1
40 CAPSULE ORAL DAILY
Qty: 30 CAPSULE | Refills: 1 | Status: SHIPPED | OUTPATIENT
Start: 2021-08-26 | End: 2021-10-07

## 2021-09-08 ASSESSMENT — SLEEP AND FATIGUE QUESTIONNAIRES
HOW LIKELY ARE YOU TO NOD OFF OR FALL ASLEEP WHILE SITTING AND TALKING TO SOMEONE: WOULD NEVER DOZE
HOW LIKELY ARE YOU TO NOD OFF OR FALL ASLEEP WHILE SITTING QUIETLY AFTER LUNCH WITHOUT ALCOHOL: HIGH CHANCE OF DOZING
HOW LIKELY ARE YOU TO NOD OFF OR FALL ASLEEP WHILE SITTING INACTIVE IN A PUBLIC PLACE: MODERATE CHANCE OF DOZING
HOW LIKELY ARE YOU TO NOD OFF OR FALL ASLEEP WHILE LYING DOWN TO REST IN THE AFTERNOON WHEN CIRCUMSTANCES PERMIT: HIGH CHANCE OF DOZING
HOW LIKELY ARE YOU TO NOD OFF OR FALL ASLEEP IN A CAR, WHILE STOPPED FOR A FEW MINUTES IN TRAFFIC: WOULD NEVER DOZE
HOW LIKELY ARE YOU TO NOD OFF OR FALL ASLEEP WHEN YOU ARE A PASSENGER IN A CAR FOR AN HOUR WITHOUT A BREAK: MODERATE CHANCE OF DOZING
HOW LIKELY ARE YOU TO NOD OFF OR FALL ASLEEP WHILE WATCHING TV: HIGH CHANCE OF DOZING
HOW LIKELY ARE YOU TO NOD OFF OR FALL ASLEEP WHILE SITTING AND READING: HIGH CHANCE OF DOZING

## 2021-09-09 ENCOUNTER — OFFICE VISIT (OUTPATIENT)
Dept: SLEEP MEDICINE | Facility: CLINIC | Age: 41
End: 2021-09-09
Payer: COMMERCIAL

## 2021-09-09 VITALS
OXYGEN SATURATION: 100 % | SYSTOLIC BLOOD PRESSURE: 137 MMHG | DIASTOLIC BLOOD PRESSURE: 92 MMHG | WEIGHT: 103 LBS | HEIGHT: 63 IN | RESPIRATION RATE: 14 BRPM | BODY MASS INDEX: 18.25 KG/M2 | HEART RATE: 80 BPM

## 2021-09-09 DIAGNOSIS — G47.19 EXCESSIVE DAYTIME SLEEPINESS: ICD-10-CM

## 2021-09-09 DIAGNOSIS — G47.10 HYPERSOMNIA: Primary | ICD-10-CM

## 2021-09-09 PROCEDURE — 99213 OFFICE O/P EST LOW 20 MIN: CPT | Performed by: INTERNAL MEDICINE

## 2021-09-09 ASSESSMENT — MIFFLIN-ST. JEOR: SCORE: 1116.33

## 2021-09-09 NOTE — NURSING NOTE
"Chief Complaint   Patient presents with     Study Results     HST f/u       Initial BP (!) 137/92   Pulse 80   Resp 14   Ht 1.6 m (5' 3\")   Wt 46.7 kg (103 lb)   SpO2 100%   BMI 18.25 kg/m   Estimated body mass index is 18.25 kg/m  as calculated from the following:    Height as of this encounter: 1.6 m (5' 3\").    Weight as of this encounter: 46.7 kg (103 lb).    Medication Reconciliation: complete  ESS 16  Tisha Huizar CMA        "

## 2021-09-09 NOTE — PROGRESS NOTES
Sleep Study Follow-Up Visit:    Date on this visit: 9/9/2021    Jessica Mello comes in today for follow-up of her home sleep study done on 8/17/21 at the [facility]  Sleep Center for possible sleep apnea.    Analysis Time:  489.5 minutes     Respiration:   Sleep Associated Hypoxemia: sustained hypoxemia was not present. Baseline oxygen saturation was 96%.  Time with saturation less than or equal to 88% was 0.2 minutes. The lowest oxygen saturation was 79%.   Snoring: Snoring was absent.  Respiratory events: The home study revealed a presence of 2 obstructive apneas and 12 mixed and central apneas. There were 0 hypopneas resulting in a combined apnea/hypopnea index [AHI] of 1.7 events per hour.  AHI was 1.8 per hour supine, 0 per hour prone, 0.6 per hour on left side, and 3.1 per hour on right side.   Pattern: Excluding events noted above, respiratory rate and pattern was Normal.     Position: Percent of time spent: supine - 67%, prone - 0%, on left - 20.7%, on right - 12%.     Heart Rate: By pulse oximetry normal rate was noted.      Assessment:   Negative for sleep apnea.  Sleep associated hypoxemia was not present.    These findings were reviewed with patient.     Past medical/surgical history, family history, social history, medications and allergies were reviewed.      Problem List:  Patient Active Problem List    Diagnosis Date Noted     Smoker 06/04/2021     Priority: Medium     Moderate major depression (H) 06/04/2021     Priority: Medium     Anxiety 06/04/2021     Priority: Medium     Fatigue, unspecified type 06/04/2021     Priority: Medium        Impression/Plan:    1.  Excessive daytime sleepiness  2.  Negative home sleep test    Patient presents with a history of nonrestorative sleep and excessive daytime sleepiness.  Daytime fatigue significantly affects her functioning and ability to participate in activities.  She also reports that she dozes off while sitting at her work task.  Although  she does not think that she has significant depression, she had went along with a trial of antidepressant.  Patient reports that antidepressant therapy did not make any difference to her daytime fatigue.    Her home sleep test is negative for sleep apnea.  Considering the level of daytime dysfunction, I recommended proceeding with further testing with PSG & MSLT to assess if she has a central disorder of hypersomnia.  For accurate testing of presence of sleep onset REM, SSRI or SNRI antidepressants have to be withheld.  Since she is on fluoxetine, I recommended a 4-week period without fluoxetine prior to testing.    Plan:     1. Hypersomnia work up with 2 week actigraphy, PSG & MSLT   2. Follow up after testing     I spent a total of 25 minutes for this appointment today which include time spent before, during and after the visit for patient care, counseling and coordination of care.    Dr. Porfirio Hewitt     CC: No Ref-Primary, Physician

## 2021-09-09 NOTE — PATIENT INSTRUCTIONS
Your BMI is Body mass index is 18.25 kg/m .  Weight management is a personal decision.  If you are interested in exploring weight loss strategies, the following discussion covers the approaches that may be successful. Body mass index (BMI) is one way to tell whether you are at a healthy weight, overweight, or obese. It measures your weight in relation to your height.  A BMI of 18.5 to 24.9 is in the healthy range. A person with a BMI of 25 to 29.9 is considered overweight, and someone with a BMI of 30 or greater is considered obese. More than two-thirds of American adults are considered overweight or obese.  Being overweight or obese increases the risk for further weight gain. Excess weight may lead to heart disease and diabetes.  Creating and following plans for healthy eating and physical activity may help you improve your health.  Weight control is part of healthy lifestyle and includes exercise, emotional health, and healthy eating habits. Careful eating habits lifelong are the mainstay of weight control. Though there are significant health benefits from weight loss, long-term weight loss with diet alone may be very difficult to achieve- studies show long-term success with dietary management in less than 10% of people. Attaining a healthy weight may be especially difficult to achieve in those with severe obesity. In some cases, medications, devices and surgical management might be considered.  What can you do?  If you are overweight or obese and are interested in methods for weight loss, you should discuss this with your provider.     Consider reducing daily calorie intake by 500 calories.     Keep a food journal.     Avoiding skipping meals, consider cutting portions instead.    Diet combined with exercise helps maintain muscle while optimizing fat loss. Strength training is particularly important for building and maintaining muscle mass. Exercise helps reduce stress, increase energy, and improves fitness.  Increasing exercise without diet control, however, may not burn enough calories to loose weight.       Start walking three days a week 10-20 minutes at a time    Work towards walking thirty minutes five days a week     Eventually, increase the speed of your walking for 1-2 minutes at time    In addition, we recommend that you review healthy lifestyles and methods for weight loss available through the National Institutes of Health patient information sites:  http://win.niddk.nih.gov/publications/index.htm    And look into health and wellness programs that may be available through your health insurance provider, employer, local community center, or jaki club.      Jessica to follow up with Primary Care provider regarding elevated blood pressure.

## 2021-09-10 NOTE — PROGRESS NOTES
Sleep study order has been forwarded to [facility] for scheduling.     Diane FRIEND CMA, SLEEP MEDICINE, 9/10/2021 8:23 AM

## 2021-10-02 NOTE — PROGRESS NOTES
Assessment & Plan     Fatigue, unspecified type  Excessive daytime sleepiness  Unclear etiology of her symptoms.  Has had a sleep study to rule out sleep apnea which was negative but still needs to get evaluated for central disorder of hypersomnia.  She was not happy that a urine drug screen was ordered at her last sleep visit without being informed of this and would like to change sleep clinics.  [place] refer to sleep clinic in that area.  Will get second level of lab evaluation done today as previous labs were all unremarkable.  It has been unclear how much her mental health is impacting the fatigue and certainly recent stressors could be complicating things.  Would like her to see psychiatry for their thoughts.  We will continue current fluoxetine dose at 40 for now.  - SLEEP EVALUATION & MANAGEMENT REFERRAL - ADULT -; Future  - UA Macro with Reflex to Micro and Culture - lab collect; Future  - Lyme Disease Josefa with reflex to WB Serum; Future  - ESR: Erythrocyte sedimentation rate; Future  - CRP, inflammation; Future  - Anti Nuclear Josefa IgG by IFA with Reflex; Future  - Hepatic panel (Albumin, ALT, AST, Bili, Alk Phos, TP); Future  - Prealbumin; Future  - Prealbumin  - Hepatic panel (Albumin, ALT, AST, Bili, Alk Phos, TP)  - Anti Nuclear Josefa IgG by IFA with Reflex  - CRP, inflammation  - ESR: Erythrocyte sedimentation rate  - Lyme Disease Josefa with reflex to WB Serum  - UA Macro with Reflex to Micro and Culture - lab collect  - Urine Microscopic  - MENTAL HEALTH REFERRAL  - Adult; Psychiatry; Psychiatry; Collaborative Care Psychiatry Service/Bridge to Long-Term Psychiatry as indicated ([phone]); Yes; Diagnostic clarification; Yes; We will contact you to schedule the appointment or ple...; Future    Anxiety  Moderate major depression (H)  As noted above  - MENTAL HEALTH REFERRAL  - Adult; Psychiatry; Psychiatry;  Collaborative Care Psychiatry Service/Bridge to Long-Term Psychiatry as indicated ([phone]); Yes; Diagnostic clarification; Yes; We will contact you to schedule the appointment or ple...; Future  - FLUoxetine (PROZAC) 40 MG capsule; Take 1 capsule (40 mg) by mouth daily    Urinary frequency  - UA Macro with Reflex to Micro and Culture - lab collect; Future  - Lyme Disease Josefa with reflex to WB Serum; Future  - ESR: Erythrocyte sedimentation rate; Future  - CRP, inflammation; Future  - Anti Nuclear Josefa IgG by IFA with Reflex; Future  - Anti Nuclear Josefa IgG by IFA with Reflex  - CRP, inflammation  - ESR: Erythrocyte sedimentation rate  - Lyme Disease Josefa with reflex to WB Serum  - UA Macro with Reflex to Micro and Culture - lab collect  - Urine Microscopic    Loss of appetite  This is unclear if this is depression related or purely from her fatigue.  Certainly her weight loss is concerning especially given that BMI is in the lower range of normal.  She denies body image concerns.  We will have her start a protein supplement daily and monitor- Hepatic panel (Albumin, ALT, AST, Bili, Alk Phos, TP); Future  - Prealbumin; Future  - Prealbumin  - Hepatic panel (Albumin, ALT, AST, Bili, Alk Phos, TP)    Need for prophylactic vaccination and inoculation against influenza    - INFLUENZA VACCINE IM > 6 MONTHS VALENT IIV4 (AFLURIA/FLUZONE)    Patient will follow up with sleep clinic and psychiatry and then [place] back to me as needed if ongoing symptoms.     37 minutes spent on the date of the encounter doing chart review, review of test results and patient visit        Patient Instructions   Start daily protein drink  Take vitamin D consistently   Continue on the fluoxetine 40 mg             Return in about 8 months (around 6/7/2022) for Routine preventive and as needed.    Liberty Alvarez MD  [facility] MADHAVI Lopez is a 40 year old who presents for the following [facility]  issues   History of Present Illness       Mental Health Follow-up:  Patient presents to follow-up on Depression & Anxiety.Patient's depression since last visit has been:  Good  The patient is not having other symptoms associated with depression.  Patient's anxiety since last visit has been:  Worse  The patient is not having other symptoms associated with anxiety.  Any significant life events: grief or loss, health concerns and other  Patient is feeling anxious or having panic attacks.  Patient has no concerns about alcohol or drug use.     Social History  Tobacco Use    Smoking status: Current Some Day Smoker      Packs/day: 0.15      Years: 20.00      Pack years: 3      Types: Cigarettes      Start date: 1998    Smokeless tobacco: Never Used  Alcohol use: Yes    Comment: 3-4 drinks per week  Drug use: Not Currently      Today's PHQ-9         PHQ-9 Total Score:     (P) 17   PHQ-9 Q9 Thoughts of better off dead/self-harm past 2 weeks :   (P) Not at all   Thoughts of suicide or self harm:      Self-harm Plan:        Self-harm Action:          Safety concerns for self or others:           She eats 2-3 servings of fruits and vegetables daily.She consumes 1 sweetened beverage(s) daily.She exercises with enough effort to increase her heart rate 9 or less minutes per day.  She exercises with enough effort to increase her heart rate 3 or less days per week.   She is taking medications regularly.       Grandmother  last week.   Staying with grandfather to help care for him.   Stress has been very high due to the above and also moving this last month.  Mood- mood is stable, same on the 40 as the 20 mg   Still very tired and possibly worsening over time    Starting to wake more often in the middle of the night over the last month. Not sure if meds or the anxiety. Harder to fall back to sleep. Fall asleep okay.   Sleeping better the last few nights. Chronic fatigue since spring 2016?    Chronic neck, shoulder pain from  whiplash injury last year/dog walking and mild low back pain  No focal joint pain.   No dry eyes/mouth  Mild urgency/frequency for few months. ? Possible hx of stones.   Appetite is the same but notes she always has a low appetite. Has been eating on a regular basis for the most part  Thinks wt loss due to not going to the store as much as she should because of her fatigue and then when don't have food just wont eat much. However, Will eat something on regular basis every few hours prevent hypoglycemia. Doesn't worry about her weight, no body image issues.     HIV/hepc screened in past since fatigue started and declined screen again..       Review of Systems   Constitutional, HEENT, cardiovascular, pulmonary, gi and gu systems are negative, except as otherwise noted.      Objective    /86   Pulse 102   Temp 98.9  F (37.2  C) (Tympanic)   Resp 14   Wt 47.2 kg (104 lb)   SpO2 99%   BMI 18.42 kg/m    Body mass index is 18.42 kg/m .  Physical Exam   GENERAL: healthy, alert and no distress  MS: no gross musculoskeletal defects noted, no edema  PSYCH: mentation appears normal, affect flat, judgement and insight intact and appearance well groomed    PHQ-9 SCORE 7/1/2021 7/28/2021 10/7/2021   PHQ-9 Total Score MyChart 18 (Moderately severe depression) 15 (Moderately severe depression) 17 (Moderately severe depression)   PHQ-9 Total Score 18 15 17     BYRON-7 SCORE 7/1/2021 7/28/2021 10/7/2021   Total Score 13 (moderate anxiety) 10 (moderate anxiety) 8 (mild anxiety)   Total Score 13 10 8             Answers for HPI/ROS submitted by the patient on 10/7/2021  If you checked off any problems, how difficult have these problems made it for you to do your work, take care of things at home, or get along with other people?: Extremely difficult  PHQ9 TOTAL SCORE: 17  BYRON 7 TOTAL SCORE: 8

## 2021-10-07 ENCOUNTER — OFFICE VISIT (OUTPATIENT)
Dept: FAMILY MEDICINE | Facility: CLINIC | Age: 41
End: 2021-10-07
Payer: COMMERCIAL

## 2021-10-07 VITALS
SYSTOLIC BLOOD PRESSURE: 114 MMHG | WEIGHT: 104 LBS | RESPIRATION RATE: 14 BRPM | HEART RATE: 102 BPM | DIASTOLIC BLOOD PRESSURE: 86 MMHG | OXYGEN SATURATION: 99 % | BODY MASS INDEX: 18.42 KG/M2 | TEMPERATURE: 98.9 F

## 2021-10-07 DIAGNOSIS — R63.0 LOSS OF APPETITE: ICD-10-CM

## 2021-10-07 DIAGNOSIS — Z23 NEED FOR PROPHYLACTIC VACCINATION AND INOCULATION AGAINST INFLUENZA: ICD-10-CM

## 2021-10-07 DIAGNOSIS — F32.1 MODERATE MAJOR DEPRESSION (H): ICD-10-CM

## 2021-10-07 DIAGNOSIS — F41.9 ANXIETY: ICD-10-CM

## 2021-10-07 DIAGNOSIS — R53.83 FATIGUE, UNSPECIFIED TYPE: Primary | ICD-10-CM

## 2021-10-07 DIAGNOSIS — G47.19 EXCESSIVE DAYTIME SLEEPINESS: ICD-10-CM

## 2021-10-07 DIAGNOSIS — R35.0 URINARY FREQUENCY: ICD-10-CM

## 2021-10-07 LAB
ALBUMIN UR-MCNC: NEGATIVE MG/DL
APPEARANCE UR: CLEAR
BACTERIA #/AREA URNS HPF: ABNORMAL /HPF
BILIRUB UR QL STRIP: NEGATIVE
COLOR UR AUTO: YELLOW
ERYTHROCYTE [SEDIMENTATION RATE] IN BLOOD BY WESTERGREN METHOD: 4 MM/HR (ref 0–20)
GLUCOSE UR STRIP-MCNC: NEGATIVE MG/DL
HGB UR QL STRIP: NEGATIVE
KETONES UR STRIP-MCNC: ABNORMAL MG/DL
LEUKOCYTE ESTERASE UR QL STRIP: ABNORMAL
NITRATE UR QL: NEGATIVE
PH UR STRIP: 7 [PH] (ref 5–7)
RBC #/AREA URNS AUTO: ABNORMAL /HPF
SP GR UR STRIP: 1.02 (ref 1–1.03)
SQUAMOUS #/AREA URNS AUTO: ABNORMAL /LPF
UROBILINOGEN UR STRIP-ACNC: 0.2 E.U./DL
WBC #/AREA URNS AUTO: ABNORMAL /HPF

## 2021-10-07 PROCEDURE — 96127 BRIEF EMOTIONAL/BEHAV ASSMT: CPT | Performed by: FAMILY MEDICINE

## 2021-10-07 PROCEDURE — 86140 C-REACTIVE PROTEIN: CPT | Performed by: FAMILY MEDICINE

## 2021-10-07 PROCEDURE — 84134 ASSAY OF PREALBUMIN: CPT | Performed by: FAMILY MEDICINE

## 2021-10-07 PROCEDURE — 86618 LYME DISEASE ANTIBODY: CPT | Performed by: FAMILY MEDICINE

## 2021-10-07 PROCEDURE — 86039 ANTINUCLEAR ANTIBODIES (ANA): CPT | Performed by: FAMILY MEDICINE

## 2021-10-07 PROCEDURE — 36415 COLL VENOUS BLD VENIPUNCTURE: CPT | Performed by: FAMILY MEDICINE

## 2021-10-07 PROCEDURE — 81001 URINALYSIS AUTO W/SCOPE: CPT | Performed by: FAMILY MEDICINE

## 2021-10-07 PROCEDURE — 99214 OFFICE O/P EST MOD 30 MIN: CPT | Mod: 25 | Performed by: FAMILY MEDICINE

## 2021-10-07 PROCEDURE — 85652 RBC SED RATE AUTOMATED: CPT | Performed by: FAMILY MEDICINE

## 2021-10-07 PROCEDURE — 80076 HEPATIC FUNCTION PANEL: CPT | Performed by: FAMILY MEDICINE

## 2021-10-07 PROCEDURE — 90471 IMMUNIZATION ADMIN: CPT | Performed by: FAMILY MEDICINE

## 2021-10-07 PROCEDURE — 90686 IIV4 VACC NO PRSV 0.5 ML IM: CPT | Performed by: FAMILY MEDICINE

## 2021-10-07 PROCEDURE — 86038 ANTINUCLEAR ANTIBODIES: CPT | Performed by: FAMILY MEDICINE

## 2021-10-07 RX ORDER — FLUOXETINE 40 MG/1
40 CAPSULE ORAL DAILY
Qty: 90 CAPSULE | Refills: 1 | Status: SHIPPED | OUTPATIENT
Start: 2021-10-07 | End: 2021-11-24 | Stop reason: DRUGHIGH

## 2021-10-07 ASSESSMENT — ANXIETY QUESTIONNAIRES
6. BECOMING EASILY ANNOYED OR IRRITABLE: NOT AT ALL
GAD7 TOTAL SCORE: 8
GAD7 TOTAL SCORE: 8
1. FEELING NERVOUS, ANXIOUS, OR ON EDGE: MORE THAN HALF THE DAYS
7. FEELING AFRAID AS IF SOMETHING AWFUL MIGHT HAPPEN: SEVERAL DAYS
8. IF YOU CHECKED OFF ANY PROBLEMS, HOW DIFFICULT HAVE THESE MADE IT FOR YOU TO DO YOUR WORK, TAKE CARE OF THINGS AT HOME, OR GET ALONG WITH OTHER PEOPLE?: SOMEWHAT DIFFICULT
7. FEELING AFRAID AS IF SOMETHING AWFUL MIGHT HAPPEN: SEVERAL DAYS
3. WORRYING TOO MUCH ABOUT DIFFERENT THINGS: SEVERAL DAYS
GAD7 TOTAL SCORE: 8
5. BEING SO RESTLESS THAT IT IS HARD TO SIT STILL: SEVERAL DAYS
4. TROUBLE RELAXING: SEVERAL DAYS
2. NOT BEING ABLE TO STOP OR CONTROL WORRYING: MORE THAN HALF THE DAYS

## 2021-10-07 ASSESSMENT — PAIN SCALES - GENERAL: PAINLEVEL: MILD PAIN (3)

## 2021-10-08 LAB
ALBUMIN SERPL-MCNC: 4.8 G/DL (ref 3.4–5)
ALP SERPL-CCNC: 60 U/L (ref 40–150)
ALT SERPL W P-5'-P-CCNC: 30 U/L (ref 0–50)
AST SERPL W P-5'-P-CCNC: 25 U/L (ref 0–45)
B BURGDOR IGG+IGM SER QL: 0.17
BILIRUB DIRECT SERPL-MCNC: 0.1 MG/DL (ref 0–0.2)
BILIRUB SERPL-MCNC: 0.5 MG/DL (ref 0.2–1.3)
CRP SERPL-MCNC: <2.9 MG/L (ref 0–8)
PROT SERPL-MCNC: 7.8 G/DL (ref 6.8–8.8)

## 2021-10-08 ASSESSMENT — PATIENT HEALTH QUESTIONNAIRE - PHQ9: SUM OF ALL RESPONSES TO PHQ QUESTIONS 1-9: 17

## 2021-10-08 ASSESSMENT — ANXIETY QUESTIONNAIRES: GAD7 TOTAL SCORE: 8

## 2021-10-11 LAB
ANA PAT SER IF-IMP: ABNORMAL
ANA SER QL IF: POSITIVE
ANA TITR SER IF: ABNORMAL {TITER}
PREALB SERPL IA-MCNC: 24 MG/DL (ref 15–45)

## 2021-10-12 DIAGNOSIS — R53.83 FATIGUE, UNSPECIFIED TYPE: Primary | ICD-10-CM

## 2021-10-12 DIAGNOSIS — R76.8 ELEVATED ANTINUCLEAR ANTIBODY (ANA) LEVEL: ICD-10-CM

## 2021-10-12 NOTE — RESULT ENCOUNTER NOTE
Jessica,  It was a pleasure to see you in the office recently.   Final results are back.   - the inflammation tests were negative  - lyme disease screening test was negative.   - the liver panel was normal.   - your protein levels were okay.   - urine testing was okay.   - interestingly, your autoimmune screening test, KEIRY, was elevated. This is non-specific (it does not indicate a specific autoimmune disease) and sometimes can be elevated without autoimmune issues (false positive).    However, it can be a clue that further evaluation is needed to look for autoimmune disease such as Lupus and sjogrens as the cause for your fatigue. This is best done with a rheumatologist (autoimmune specialist). I've placed a referral for this and a  will reach out to you to make the appointment.   Please MyChart or call if you have any concerns or questions.   Sincerely,  Liberty Alvarez MD

## 2021-10-13 ENCOUNTER — TELEPHONE (OUTPATIENT)
Dept: SLEEP MEDICINE | Facility: HOSPITAL | Age: 41
End: 2021-10-13

## 2021-10-19 ENCOUNTER — VIRTUAL VISIT (OUTPATIENT)
Dept: SLEEP MEDICINE | Facility: HOSPITAL | Age: 41
End: 2021-10-19
Attending: FAMILY MEDICINE
Payer: COMMERCIAL

## 2021-10-19 DIAGNOSIS — G47.10 HYPERSOMNIA: Primary | ICD-10-CM

## 2021-10-19 DIAGNOSIS — F32.1 MODERATE MAJOR DEPRESSION (H): ICD-10-CM

## 2021-10-19 DIAGNOSIS — F41.9 ANXIETY: ICD-10-CM

## 2021-10-19 PROBLEM — F32.9 MAJOR DEPRESSION: Status: ACTIVE | Noted: 2021-06-04

## 2021-10-19 PROCEDURE — 99214 OFFICE O/P EST MOD 30 MIN: CPT | Mod: GT | Performed by: FAMILY MEDICINE

## 2021-10-19 NOTE — PROGRESS NOTES
"Jessica Mello is a 40 year old female who is being evaluated via a billable video visit.       The patient has been notified of following:      \"This video visit will be conducted via a call between you and your physician/provider. We have found that certain health care needs can be provided without the need for an in-person physical exam.  This service lets us provide the care you need with a video conversation.  If a prescription is necessary we can send it directly to your pharmacy.  If lab work is needed we can place an order for that and you can then stop by our lab to have the test done at a later time.     Video visits are billed at different rates depending on your insurance coverage.  Please reach out to your insurance provider with any questions.     If during the course of the call the physician/provider feels a video visit is not appropriate, you will not be charged for this service.\"     Patient has given verbal consent for Video visit? Yes  How would you like to obtain your AVS? Mail a copy  If you are dropped from the video visit, the video invite should be resent to: Text to cell phone: -  Will anyone else be joining your video visit? No  If patient encounters technical issues they should call [phone]      Video-Visit Details     Type of service:  Video Visit     Video Start Time: 1pm  Video End Time: 1:30pm    Originating Location (pt. Location): Home     Distant Location (provider location):  [facility]      Platform used for Video Visit: Doximity    Virtual visit for excessive daytime sleepiness and discussion of further testing.     Assessment:  -Excessive daytime sleepiness  -Potential excessive sleep need  -Comorbid depression    Plan:  -Overall, I have less concern that depression is a primary cause for her symptoms given that she appears to be actually sleeping for excessive periods of time as opposed to spending excessive time in " bed.  -Clinically, her story would seem to best fit idiopathic hypersomnia with long sleep time given her reported long sleep need, excessive daytime fatigue and sleepiness, ability to take frequent naps if allowed.  Largely negative narcolepsy triad.  -Given that she reports significant mood benefit from SSRI, we discussed the potential impact on the MSLT and I feel it is reasonable to continue given that narcolepsy with classical sleep onset rems is lower on my concern as opposed to idiopathic hypersomnia.  -Place orders for actigraphy x2 weeks, PSG, MSLT with urine toxicology on morning of MSLT.  Patient was informed in regards to the urine toxicology ordered.    SUBJECTIVE:  Jessica Mello is a 40 year old year old female.    Pertinent PMHx of cigarette smoking, MDD, anxiety, fatigue / hypersomnia.    Prior sleep testing:  HST on 8/17/2021 with analysis time 489.5 minutes, AHI 1.7, baseline SpO2 96%, erica SpO2 79%, <= 88% of 0.2 minutes.    Most recent visit with Dr. Hewitt on 9/9/2021 to review sleep testing results.  Primary concern of hypersomnia.  No reported benefit from trial of anti-depressant.  Recommended to proceed with actigraphy, PSG, MSLT.    Reviewed recent visit with her primary physician, Dr. Alvarez on 10/7/2021.  Reported was upset about urine toxicology being ordered at last visit with sleep clinic.  Thorough lab work-up negative to date, plan for referral to psychiatry and follow-up with our sleep clinic.  Referral to rheumatology given elevated KEIRY with nucleolar staining pattern.    Normal lab results for: CMP, albumin, pre-albumin, bilirubin direct, CRP, HDL, LDL, triglycerides, TSH, vit B12, bit D, CBC, ESR, lyme Ab.  Low normal ferritin at 20.  KEIRY returned positive (1:320) with nucleolar pattern.  Did not see results for urine toxicology.    Today -we reviewed her interim history.  As noted above, in general thorough blood work has been negative outside of a positive KEIRY  with a nucleolar pattern.  She is scheduled to see rheumatology in January.  She is at her sleepiness started [id number], and while initially was more mild fatigue feels like it is progressed now to having significant daytime exhaustion and inappropriate sleepiness.  When allowed to sleep ad caitlyn., she was she could sleep 10 or more hours.    She has moved up to [place] to help her grandfather after the recent passing of her grandmother.  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    Most nights in bed around 9:30 PM, will sleep quickly.  She may have 1-2 awakenings, these may be as long as 30-60 minutes.  She can sleep potentially as late as 8:30 AM, but says may wake up up 5-6 AM and had difficulty returning to sleep.  She seems to report taking often 1 or 2 naps daily, usually for 15 minutes.    She does seem to report some sleep paralysis.  But largely denies any hypnagogic or hypnopompic hallucinations, denies cataplexy.             Past medical history:    Patient Active Problem List    Diagnosis Date Noted     Smoker 06/04/2021     Priority: Medium     Moderate major depression (H) 06/04/2021     Priority: Medium     Anxiety 06/04/2021     Priority: Medium     Fatigue, unspecified type 06/04/2021     Priority: Medium       10 point ROS of systems including Constitutional, Eyes, Respiratory, Cardiovascular, Gastroenterology, Genitourinary, Integumentary, Muscularskeletal, Psychiatric were all negative except for pertinent positives noted in my HPI.    Current Outpatient Medications   Medication Sig Dispense Refill     FLUoxetine (PROZAC) 40 MG capsule Take 1 capsule (40 mg) by mouth daily 90 capsule 1     VITAMIN D PO Take 2,000 Int'l Units by mouth daily         OBJECTIVE:  There were no vitals taken for this visit.    Physical Exam     ---  This note was written with the assistance of the Dragon voice-dictation technology software. The final document, although reviewed, may  contain errors. For corrections, please contact the office.    Gigi Boothe MD    Sleep Medicine  [facility]  ([phone])  [facility]  ([phone])

## 2021-10-19 NOTE — PROGRESS NOTES
"Jessica is a 40 year old who is being evaluated via a billable video visit.      How would you like to obtain your AVS? MyChart  If the video visit is dropped, the invitation should be resent by: Text to cell phone: [phone]  Will anyone else be joining your video visit? No  {If patient encounters technical issues they should call [phone] :709021}    Video Start Time:     {PROVIDER CHARTING PREFERENCE:651963}    Subjective   Jessica is a 40 year old who presents for the following health issues {ACCOMPANIED BY STATEMENT (Optional):897437}    HPI     ***    Review of Systems   {ROS COMP (Optional):703558}      Objective           Vitals:  No vitals were obtained today due to virtual visit.    Physical Exam   {video visit exam brief selected:933950::\"GENERAL: Healthy, alert and no distress\",\"EYES: Eyes grossly normal to inspection.  No discharge or erythema, or obvious scleral/conjunctival abnormalities.\",\"RESP: No audible wheeze, cough, or visible cyanosis.  No visible retractions or increased work of breathing.  \",\"SKIN: Visible skin clear. No significant rash, abnormal pigmentation or lesions.\",\"NEURO: Cranial nerves grossly intact.  Mentation and speech appropriate for age.\",\"PSYCH: Mentation appears normal, affect normal/bright, judgement and insight intact, normal speech and appearance well-groomed.\"}    {Diagnostic Test Results (Optional):282650}    {AMBULATORY ATTESTATION (Optional):172462}        Video-Visit Details    Type of service:  Video Visit    Video End Time:{video visit start/end time for provider to select:660083}    Originating Location (pt. Location): {video visit patient location:744585::\"Home\"}    Distant Location (provider location):  [facility]     Platform used for Video Visit: {Virtual Visit Platforms:264999::\"NoWait\"}  "

## 2021-11-18 ENCOUNTER — MYC MEDICAL ADVICE (OUTPATIENT)
Dept: FAMILY MEDICINE | Facility: CLINIC | Age: 41
End: 2021-11-18
Payer: COMMERCIAL

## 2021-11-23 ASSESSMENT — ANXIETY QUESTIONNAIRES
5. BEING SO RESTLESS THAT IT IS HARD TO SIT STILL: MORE THAN HALF THE DAYS
7. FEELING AFRAID AS IF SOMETHING AWFUL MIGHT HAPPEN: SEVERAL DAYS
4. TROUBLE RELAXING: MORE THAN HALF THE DAYS
6. BECOMING EASILY ANNOYED OR IRRITABLE: SEVERAL DAYS
1. FEELING NERVOUS, ANXIOUS, OR ON EDGE: MORE THAN HALF THE DAYS
3. WORRYING TOO MUCH ABOUT DIFFERENT THINGS: MORE THAN HALF THE DAYS
2. NOT BEING ABLE TO STOP OR CONTROL WORRYING: MORE THAN HALF THE DAYS
8. IF YOU CHECKED OFF ANY PROBLEMS, HOW DIFFICULT HAVE THESE MADE IT FOR YOU TO DO YOUR WORK, TAKE CARE OF THINGS AT HOME, OR GET ALONG WITH OTHER PEOPLE?: SOMEWHAT DIFFICULT
GAD7 TOTAL SCORE: 12
7. FEELING AFRAID AS IF SOMETHING AWFUL MIGHT HAPPEN: SEVERAL DAYS
GAD7 TOTAL SCORE: 12
GAD7 TOTAL SCORE: 12

## 2021-11-24 ENCOUNTER — VIRTUAL VISIT (OUTPATIENT)
Dept: BEHAVIORAL HEALTH | Facility: CLINIC | Age: 41
End: 2021-11-24
Payer: COMMERCIAL

## 2021-11-24 ENCOUNTER — VIRTUAL VISIT (OUTPATIENT)
Dept: PSYCHIATRY | Facility: CLINIC | Age: 41
End: 2021-11-24
Attending: FAMILY MEDICINE
Payer: COMMERCIAL

## 2021-11-24 ENCOUNTER — MYC MEDICAL ADVICE (OUTPATIENT)
Dept: FAMILY MEDICINE | Facility: CLINIC | Age: 41
End: 2021-11-24
Payer: COMMERCIAL

## 2021-11-24 VITALS — WEIGHT: 108 LBS | BODY MASS INDEX: 19.13 KG/M2

## 2021-11-24 DIAGNOSIS — F41.9 ANXIETY: Primary | ICD-10-CM

## 2021-11-24 DIAGNOSIS — F41.9 ANXIETY: ICD-10-CM

## 2021-11-24 DIAGNOSIS — F32.89 OTHER DEPRESSION: ICD-10-CM

## 2021-11-24 DIAGNOSIS — F32.1 MODERATE MAJOR DEPRESSION (H): ICD-10-CM

## 2021-11-24 DIAGNOSIS — F33.0 MILD RECURRENT MAJOR DEPRESSION (H): Primary | ICD-10-CM

## 2021-11-24 DIAGNOSIS — R53.83 FATIGUE, UNSPECIFIED TYPE: Primary | ICD-10-CM

## 2021-11-24 PROCEDURE — 90791 PSYCH DIAGNOSTIC EVALUATION: CPT | Mod: GT | Performed by: SOCIAL WORKER

## 2021-11-24 PROCEDURE — 99205 OFFICE O/P NEW HI 60 MIN: CPT | Mod: GT | Performed by: PSYCHIATRY & NEUROLOGY

## 2021-11-24 PROCEDURE — 99417 PROLNG OP E/M EACH 15 MIN: CPT | Mod: GT | Performed by: PSYCHIATRY & NEUROLOGY

## 2021-11-24 ASSESSMENT — ANXIETY QUESTIONNAIRES: GAD7 TOTAL SCORE: 12

## 2021-11-24 ASSESSMENT — PATIENT HEALTH QUESTIONNAIRE - PHQ9: SUM OF ALL RESPONSES TO PHQ QUESTIONS 1-9: 15

## 2021-11-24 ASSESSMENT — COLUMBIA-SUICIDE SEVERITY RATING SCALE - C-SSRS
3. HAVE YOU BEEN THINKING ABOUT HOW YOU MIGHT KILL YOURSELF?: NO
1. IN THE PAST MONTH, HAVE YOU WISHED YOU WERE DEAD OR WISHED YOU COULD GO TO SLEEP AND NOT WAKE UP?: NO
2. HAVE YOU ACTUALLY HAD ANY THOUGHTS OF KILLING YOURSELF LIFETIME?: NO
2. HAVE YOU ACTUALLY HAD ANY THOUGHTS OF KILLING YOURSELF?: NO
5. HAVE YOU STARTED TO WORK OUT OR WORKED OUT THE DETAILS OF HOW TO KILL YOURSELF? DO YOU INTEND TO CARRY OUT THIS PLAN?: NO
1. IN THE PAST MONTH, HAVE YOU WISHED YOU WERE DEAD OR WISHED YOU COULD GO TO SLEEP AND NOT WAKE UP?: NO
5. HAVE YOU STARTED TO WORK OUT OR WORKED OUT THE DETAILS OF HOW TO KILL YOURSELF? DO YOU INTEND TO CARRY OUT THIS PLAN?: NO
4. HAVE YOU HAD THESE THOUGHTS AND HAD SOME INTENTION OF ACTING ON THEM?: NO
4. HAVE YOU HAD THESE THOUGHTS AND HAD SOME INTENTION OF ACTING ON THEM?: NO

## 2021-11-24 NOTE — PATIENT INSTRUCTIONS
Decrease fluoxetine to 20 mg daily.    Continue all other medications per your primary care provider.    Schedule an appointment with me in 3 months or sooner as needed.  You may call [facility] at [phone] to schedule.    [place] Resources:      Go to the Emergency Department as needed or call after hours crisis line at [phone].      To schedule individual or family therapy, call [facility] at [phone].     Follow up with primary care provider as planned or sooner for acute medical concerns.    Call the psychiatric nurse line with medication questions or concerns at [phone].    [employer] may be used to communicate with your provider, but this is not intended to be used for emergencies.    Community Resources:      National Suicide Prevention Lifeline: 289.177.3467 (TTY: 554.337.3018). Call anytime for help.  (www.suicidepreventionlifeline.org)    National [place] on Mental Illness (www.[place].org): 799.639.1411 or 696-294-6583.     Mental Health Association (www.mentalhealth.org): 196.949.4481 or 992-746-0690.    [place] Crisis Text Line: Text [place] to 402714    Suicide LifeLine Chat: suicidepreventionlifeline.org/chat

## 2021-11-24 NOTE — Clinical Note
Lori,    I met with Jessica today.  It does not appear that her fatigue and sleepiness are caused primarily by psychiatric conditions.  She endorses some symptoms that are consistent with attention deficit disorder, although she was not diagnosed with it as I child.  She may certainly benefit from a trial of stimulant at some point in the future.  We agreed to defer any possible treatment until after she completes her sleep study and is seen by the rheumatologist.    Please feel free to contact me with questions or concerns.  Thank you for the opportunity to be involved in the care of this patient.    Regards,  Lucy Escobar MD  Collaborative Care Psychiatry  [facility]

## 2021-11-24 NOTE — PROGRESS NOTES
"Collaborative Care Psychiatry Service  Provider Name: Debbie Man, [facility], [place] [facility]    PATIENT'S NAME: Jessica Mello  PREFERRED NAME: Jessica  PREFERRED PRONOUNS:    MRN:   [id number]  :   1980   ACCT. NUMBER: [id number]  DATE OF SERVICE: 21  START TIME: 7:25a  END TIME: 7:55a    BRIEF ADULT DIAGNOSTIC ASSESSMENT    Telemedicine Visit: The patient's condition can be safely assessed and treated via synchronous audio and visual telemedicine encounter.      Reason for Telemedicine Visit: Services only offered telehealth    Originating Site (Patient Location): Patient's home    Distant Site (Provider Location): Provider Remote Setting- Home Office    Consent:  The patient/guardian has verbally consented to: the potential risks and benefits of telemedicine (video visit) versus in person care; bill my insurance or make self-payment for services provided; and responsibility for payment of non-covered services.     Mode of Communication:  Video Conference via [employer]    As the provider I attest to compliance with applicable laws and regulations related to telemedicine.    Identifying Information:  Patient is a 41 year old, .  The pronoun use throughout this assessment reflects the patient's chosen pronoun.  Patient was referred for an assessment by primary care provider.  Patient attended the session alone.     Chief Complaint:   The reason for seeking services at this time is: \" to find out if sleep issues are related to depression \"   The problem(s) began 5-6 years ago. Patient has attempted to resolve these concerns in the past through medications, tests/labs and has an upcoming sleep study..Pt shares that she started to see a new provider earlier this year for a physical.  She shares that she told primary provider about her excessive sleepiness for the past 5-6 years and PCP wanted to run some tests/labs.  Pt was started on prozac for depression symptoms (after trying and not liking " wellbutrin).  Pt shares that her PCP then referred her to [facility] to look into if depression could be causing her excessive sleepiness.  Pt is set to have a sleep study in December.  She also shares that one of her sisters is also struggling with similar issues.    Pt shares that she cannot get through the day without falling asleep.  She shares that she falls asleep fine but wakes 1-2 times at night.  She shares that she never fees rested.  Has a regular sleep schedule and is in bed at 10p and wakes up at 6a.  Usually has to nap during the day primarily in the afternoon after work.    Pt works full time and can get through the day but she shares it is a struggle.  Lives alone with her pets.  Has good support from family.  Denies any issues with substances and drinks socially (maybe 1x/week) and smokes marijuana at the most 1x/month.  Denies any SI.    Does the client have any condition that is currently presenting as a potential to harm themselves or others (severe withdrawal, serious medical condition, severe emotional/behavioral problem)? No.  Proceed with assessment.    Review of Symptoms per patient report:  Depression: Change in sleep, Lack of interest, Change in energy level and Difficulties concentrating  Miryam:  Elevated mood- Days of high energy but happens only occasionally.    Psychosis: No Symptoms  Anxiety: Excessive worry, Nervousness and Social anxiety- general overall worry.  Difficulty with strangers and with large groups.  Panic:  has had panic attacks in past about 4-5 years ago.  Most recently about 1 year ago.  Post Traumatic Stress Disorder:  No Symptoms   Eating Disorder: No Symptoms  ADD / ADHD:  Inattentive, Poor task completion, Distractibility, Forgetful and Restlessness/fidgety.  Makes a lot of lists.  Conduct Disorder: No symptoms  Autism Spectrum Disorder: No symptoms  Obsessive Compulsive Disorder: No Symptoms    Sleep:falls asleep easily.  Does wake 1-2 times a night and can usually  fall back asleep.  Never feels rested.    Caffeine:  Coffee (switched to half caffeine coffee)  Tobacco: will have a couple cigarettes a week.  Usually social smoking    Current alcohol use: weekly.  socially  Current drug use: marijuana maybe 1x/month    Rating Scales:  PHQ-9:   [facility] Follow-up to PHQ 7/28/2021 10/7/2021 11/23/2021   PHQ-9 9. Suicide Ideation past 2 weeks Not at all Not at all Not at all      Answers for HPI/ROS submitted by the patient on 11/23/2021  If you checked off any problems, how difficult have these problems made it for you to do your work, take care of things at home, or get along with other people?: Extremely difficult  PHQ9 TOTAL SCORE: 15  BYRON 7 TOTAL SCORE: 12    GAD7:    BYRON-7 SCORE 7/28/2021 10/7/2021 11/23/2021   Total Score 10 (moderate anxiety) 8 (mild anxiety) 12 (moderate anxiety)   Total Score 10 8 12     CGI:  First:  No data recorded  Most recent:  No data recorded    WHODAS:   WHODAS 2.0 Total Score 11/23/2021   Total Score 36   Total Score MyChart 36        CAGE:    CAGE-AID Flowsheet 11/23/2021   Have you ever felt you should Cut down on your drinking or drug use? 0   Have people Annoyed you by criticizing your drinking or drug use? 0   Have you ever felt bad or Guilty about your drinking or drug use? 0   Have you ever had a drink or used drugs first thing in the morning to steady your nerves or to get rid of a hangover? (Eye opener) 0   CAGE-AID SCORE 0   1. Have you felt you ought to cut down on your drinking or drug use? No   2. Have people annoyed you by criticizing your drinking or drug use? No   3. Have you felt bad or guilty about your drinking or drug use? No   4. Have you ever had a drink or used drugs first thing in the morning to steady your nerves or to get rid of a hangover (eye opener)? No   CAGE-AID SCORE 0 (A total score of 2 or greater is considered clinically significant)         Personal Medical History:  No past medical history on file.    Patient has  "not received mental health services in the past: none.  Psychiatric Hospitalizations: None.  Patient denies a history of civil commitment. Currently, patient is not receiving other mental health services.  These include none.     Patient does not report a history of head injury / trauma / cognitive impairment / seizures.      Current Medications:  Current Outpatient Medications   Medication Sig Dispense Refill     FLUoxetine (PROZAC) 40 MG capsule Take 1 capsule (40 mg) by mouth daily 90 capsule 1     VITAMIN D PO Take 2,000 Int'l Units by mouth daily          Allergies:  Allergies   Allergen Reactions     Penicillins Hives       Family Psychiatric History:  Patient did report a family history of mental health concerns.     Family History     Problem (# of Occurrences) Relation (Name,Age of Onset)    Anxiety Disorder (1) Sister (sis)    Attention Deficit Disorder (1) Sister    Breast Cancer (1) Mother (mom, 65)    Colon Cancer (1) Paternal Uncle: dx after age 50    Depression (2) Sister (sis), Sister    Hyperlipidemia (1) Father (dad)    Hypertension (1) Mother (mom)    Narcolepsy (1) Sister (sis)    Thyroid Disease (2) Sister (sis), Maternal Grandmother (gma)          Social/Family History:  Patient reported they grew up in [place], [place].  They were raised by biological parents. Has 3 siblings.  Patient reported that   childhood was \"good\".  Liked small town upbringing.  Patient denies experiencing childhood abuse/neglect. Father is difficult and had anger issues.  Patient described their current relationships with family of origin as \"pretty good\".  Close with mother and siblings.      The patient has been  0 times and has 0 children.   Patient reported having few good friends.     Cultural influences and impact on patient's life structure, values, norms, and healthcare: Spiritual Beliefs: pt shares she grew up Denominational.  Doesn't practice the [employer]ish Shinto currently.. Patient identified their preferred " language to be English. Patient reported they does not need the assistance of an  or other support involved in treatment.       Educational/Occupational History:  Patient reported   highest education level was college graduate. The patient did not serve in the .  Patient is currently employed full time and reports they are able to function appropriately at work.. Sleep affects her at work having to force herself to stay awake.      Social History     Socioeconomic History     Marital status: Single     Spouse name: Not on file     Number of children: Not on file     Years of education: Not on file     Highest education level: Not on file   Occupational History     Not on file   Tobacco Use     Smoking status: Current Some Day Smoker     Packs/day: 0.15     Years: 20.00     Pack years: 3.00     Types: Cigarettes     Start date: 6/2/1998     Smokeless tobacco: Never Used   Substance and Sexual Activity     Alcohol use: Yes     Comment: 3-4 drinks per week     Drug use: Not Currently     Sexual activity: Not Currently   Other Topics Concern     Parent/sibling w/ CABG, MI or angioplasty before 65F 55M? No   Social History Narrative     Not on file     Social Determinants of Health     Financial Resource Strain: Not on file   Food Insecurity: Not on file   Transportation Needs: Not on file   Physical Activity: Not on file   Stress: Not on file   Social Connections: Not on file   Intimate Partner Violence: Not on file   Housing Stability: Not on file       Patient reported that they have not been involved with the legal system.   Patient denies being on probation / parole / under the jurisdiction of the court.    Current Mental Status Exam:   Appearance:   Appropriate    Eye Contact:   Good   Psychomotor:   Normal   Attitude / Demeanor:  Cooperative   Speech      Rate / Production:  Normal/ Responsive      Volume:   Normal  volume      Language:   intact  Mood:    Normal  Affect:    Appropriate     Thought Content:  Clear   Thought Process:  Coherent       Associations:  No loosening of associations  Insight:    Good   Judgment:   Intact   Orientation:   All  Attention/concentration: Good      Safety Assessment:   Current Safety Concerns:  [place] Suicide Severity Rating Scale (Lifetime/Recent)  [place] Suicide Severity Rating (Lifetime/Recent) 11/24/2021   1. Wish to be Dead (Lifetime) No   1. Wish to be Dead (Recent) No   2. Non-Specific Active Suicidal Thoughts (Lifetime) No   2. Non-Specific Active Suicidal Thoughts (Recent) No   3. Active Suicidal Ideation with any Methods (Not Plan) Without Intent to Act (Lifetime) No   3. Active Suicidal Ideation with any Methods (Not Plan) Without Intent to Act (Recent) No   4. Active Suicidal Ideation with Some Intent to Act, Without Specific Plan (Lifetime) No   4. Active Suicidal Ideation with Some Intent to Act, Without Specific Plan (Recent) No   5. Active Suicidal Ideation with Specific Plan and Intent (Lifetime) No   5. Active Suicidal Ideation with Specific Plan and Intent (Recent) No     Patient denies current homicidal ideation and behaviors.  Patient denies current self-injurious ideation and behaviors.    Patient denied risk behaviors associated with substance use.  Patient denies any high risk behaviors associated with mental health symptoms.  Patient reports the following current concerns for their personal safety: None.  Patient reports there no firearms in the house. denies.     History of Safety Concerns:  Patient denied a history of homicidal ideation.     Patient denied a history of personal safety concerns.    Patient denied a history of assaultive behaviors.    Patient denied a history of sexual assault behaviors.     Patient denied a history of risk behaviors associated with substance use.  Patient denies any history of high risk behaviors associated with mental health symptoms.  Patient reports the following protective factors: positive  relationships positive social network and positive family connections, forward/future oriented thinking, dedication to family/friends, adherence with prescribed medication, daily obligations and committment to well-being    Risk Plan:  See Preliminary Treatment Plan for Safety and Risk Management Plan    Diagnosis:  Diagnostic Criteria:   Major Depressive Disorder  CRITERIA (A-C) REPRESENT A MAJOR DEPRESSIVE EPISODE - SELECT THESE CRITERIA  A) Recurrent episode(s) - symptoms have been present during the same 2-week period and represent a change from previous functioning 5 or more symptoms (required for diagnosis)   - Depressed mood. Note: In children and adolescents, can be irritable mood.     - Diminished interest or pleasure in all, or almost all, activities.    - Increased sleep.    - Psychomotor activity retardation.    - Fatigue or loss of energy.   B) The symptoms cause clinically significant distress or impairment in social, occupational, or other important areas of functioning  C) The episode is not attributable to the physiological effects of a substance or to another medical condition  D) The occurence of major depressive episode is not better explained by other thought / psychotic disorders  E) There has never been a manic episode or hypomanic episode    Diagnoses:  1. Mild recurrent major depression (H)        Patient's Strengths and Limitations:  Patient identified the following strengths or resources that will help them succeed in treatment: commitment to health and well being, friends / good social support, family support, insight and intelligence. Things that may interfere with the patient's success in treatment include: none identified.     Recommendations:     1. Plan for Safety and Risk Management:Recommended that patient call 911 or go to the local ED should there be a change in any of these risk factors..  Report to child / adult protection services was n/a.      2. Resources/Service  Plan:       services are not indicated.     Modifications to assist communication are not indicated.     Additional disability accommodations are not indicated.      3. Collaboration:  Collaboration / coordination of treatment will be initiated with the following support professionals: psychiatry.      4.  Referrals:   The following referral(s) will be initiated: none at this time.       Staff Name/Credentials:  Debbie Man on 11/24/2021 at 8:08 AM    November 24, 2021
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
## 2021-11-24 NOTE — NURSING NOTE
________________________________  Medications Phoned  to Pharmacy [] yes [x]no  Name of Pharmacist:  List Medications, including dose, quantity and instructions     Medications ordered this visit were e-scribed.  Verified by order class [] yes  [x] no    Medication changes or discontinuations were communicated to patient's pharmacy: [] yes  [x] no     Dictation completed at time of chart check: [x] yes  [x] no     I have checked the documentation for today s encounters and the above information has been reviewed and completed.      Lona Costello MA on November 24, 2021 at 10:39 AM

## 2021-12-21 ENCOUNTER — THERAPY VISIT (OUTPATIENT)
Dept: SLEEP MEDICINE | Facility: HOSPITAL | Age: 41
End: 2021-12-21
Attending: FAMILY MEDICINE
Payer: COMMERCIAL

## 2022-01-03 ASSESSMENT — SLEEP AND FATIGUE QUESTIONNAIRES
HOW LIKELY ARE YOU TO NOD OFF OR FALL ASLEEP WHILE SITTING INACTIVE IN A PUBLIC PLACE: MODERATE CHANCE OF DOZING
HOW LIKELY ARE YOU TO NOD OFF OR FALL ASLEEP WHILE SITTING AND TALKING TO SOMEONE: SLIGHT CHANCE OF DOZING
HOW LIKELY ARE YOU TO NOD OFF OR FALL ASLEEP WHILE WATCHING TV: HIGH CHANCE OF DOZING
HOW LIKELY ARE YOU TO NOD OFF OR FALL ASLEEP WHILE SITTING AND READING: HIGH CHANCE OF DOZING
HOW LIKELY ARE YOU TO NOD OFF OR FALL ASLEEP WHILE SITTING QUIETLY AFTER LUNCH WITHOUT ALCOHOL: HIGH CHANCE OF DOZING
HOW LIKELY ARE YOU TO NOD OFF OR FALL ASLEEP WHEN YOU ARE A PASSENGER IN A CAR FOR AN HOUR WITHOUT A BREAK: MODERATE CHANCE OF DOZING
HOW LIKELY ARE YOU TO NOD OFF OR FALL ASLEEP WHILE LYING DOWN TO REST IN THE AFTERNOON WHEN CIRCUMSTANCES PERMIT: HIGH CHANCE OF DOZING
HOW LIKELY ARE YOU TO NOD OFF OR FALL ASLEEP IN A CAR, WHILE STOPPED FOR A FEW MINUTES IN TRAFFIC: WOULD NEVER DOZE

## 2022-01-04 ENCOUNTER — VIRTUAL VISIT (OUTPATIENT)
Dept: SLEEP MEDICINE | Facility: HOSPITAL | Age: 42
End: 2022-01-04
Attending: FAMILY MEDICINE
Payer: COMMERCIAL

## 2022-01-04 VITALS — HEIGHT: 63 IN | BODY MASS INDEX: 18.25 KG/M2 | WEIGHT: 103 LBS

## 2022-01-04 DIAGNOSIS — G47.419 NARCOLEPSY WITHOUT CATAPLEXY(347.00): Primary | ICD-10-CM

## 2022-01-04 PROCEDURE — 99215 OFFICE O/P EST HI 40 MIN: CPT | Mod: GT | Performed by: FAMILY MEDICINE

## 2022-01-04 RX ORDER — MODAFINIL 200 MG/1
TABLET ORAL
Qty: 30 TABLET | Refills: 5 | Status: SHIPPED | OUTPATIENT
Start: 2022-01-04 | End: 2022-01-13

## 2022-01-04 ASSESSMENT — MIFFLIN-ST. JEOR: SCORE: 1111.33

## 2022-01-04 NOTE — PROGRESS NOTES
"Jessica is a 41 year old who is being evaluated via a billable video visit.      How would you like to obtain your AVS? MyChart  If the video visit is dropped, the invitation should be resent by: Text to cell phone: [phone]  Will anyone else be joining your video visit? No  {If patient encounters technical issues they should call [phone] :[id number]}    Video Start Time: {video visit start/end time for provider to select:152948}  Video-Visit Details    Type of service:  Video Visit    Video End Time:{video visit start/end time for provider to select:152948}    Originating Location (pt. Location): {video visit patient location:933322::\"Home\"}    Distant Location (provider location):  [facility]     Platform used for Video Visit: {Virtual Visit Platforms:838240::\"AmWell\"}  "

## 2022-01-04 NOTE — PROCEDURES
" SLEEP STUDY INTERPRETATION  DIAGNOSTIC POLYSOMNOGRAPHY REPORT      Patient: CORINNA RAMIREZ  YOB: 1980  Study Date: 12/21/2021  MRN: [id number]  Referring Provider: LU CLARK MD  Ordering Provider: LEAH FIERRO MD    Indications for Polysomnography: The patient is a 41 year old Female who is 5' 3\" and weighs 108.0 lbs. Her BMI is 19.1, Cameron sleepiness scale - and neck circumference is 14.75 cm. Relevant medical history includes cigarette smoking, MDD, anxiety, fatigue / hypersomnia. A diagnostic polysomnogram was performed to evaluate for sleep apnea.    Polysomnogram Data: A full night polysomnogram recorded the standard physiologic parameters including EEG, EOG, EMG, ECG, nasal and oral airflow. Respiratory parameters of chest and abdominal movements were recorded with respiratory inductance plethysmography. Oxygen saturation was recorded by pulse oximetry. Hypopnea scoring rule used: 1B 4%.    Sleep Architecture: Decreased sleep latency, increased REM latency.  Increased N2, decreased N3.  Supine REM was observed.  Noted by technologist to be easily aroused from sleep by outside noise.  The total recording time of the polysomnogram was 479.3 minutes. The total sleep time was 433.5 minutes. Sleep latency was decreased at 2.6 minutes without the use of a sleep aid. REM latency was 132.0 minutes. Arousal index was normal at 6.9 arousals per hour. Sleep efficiency was normal at 90.5%. Wake after sleep onset was 39.5 minutes. The patient spent 4.3% of total sleep time in Stage N1, 65.7% in Stage N2, 4.4% in Stage N3, and 25.6% in REM. Time in REM supine was 23.5 minutes.    Respiration: No sleep-disordered breathing (AHI 0.1) without sleep-associated hypoxemia.    Events ? The polysomnogram revealed a presence of - obstructive, - central, and - mixed apneas resulting in an apnea index of - events per hour. There were 1 obstructive hypopneas and - central hypopneas resulting " in an obstructive hypopnea index of 0.1 and central hypopnea index of - events per hour. The combined apnea/hypopnea index was 0.1 events per hour (central apnea/hypopnea index was - events per hour). The REM AHI was - events per hour. The supine AHI was 0.3 events per hour. The RERA index was - events per hour.  The RDI was 0.1 events per hour.    Snoring - was reported as absent.    Respiratory rate and pattern - was notable for normal respiratory rate and pattern.    Sustained Sleep Associated Hypoventilation - End-tidal carbon dioxide monitoring was not used, however significant hypoventilation was not suggested by oximetry.    Sleep Associated Hypoxemia - (Greater than 5 minutes O2 sat at or below 88%) was not present. Baseline oxygen saturation was 97.1%. Lowest oxygen saturation was 82.0%. Time spent less than or equal to 88% was 0 minutes. Time spent less than or equal to 89% was 0 minutes.    Movement Activity: Rare PLM s observed.    Periodic Limb Activity - There were 6 PLMs during the entire study. The PLM index was 0.8 movements per hour. The PLM Arousal Index was 0.8 per hour.    REM EMG Activity - Excessive transient/sustained muscle activity was not present.    Nocturnal Behavior - Abnormal sleep related behaviors were not noted during/arising out of NREM / REM sleep.     Bruxism - None apparent.    Cardiac Summary: Appears NSR  The average pulse rate was - bpm. The minimum pulse rate was - bpm while the maximum pulse rate was - bpm.  Arrhythmias were/were not noted.        Pre PSG Evaluation: regular sleep pattern with sufficient total sleep time    Sleep Log - Consistent with actigraphy.    Actigraphy -   o Dates of recording - From 12/5/2021 to 12/21/2021.  o Quality - Good.  o Sleep diary - Correlates well/poorly.  o Bed Time - Average at 9:45pm.  o Get Up Time - Average at 7am.  o Time in Bed - Average at 9.25 hours.  o Total Sleep Time - Average at 8 hours.  - Minimum 5 hours.  - Maximum 9.5  hours.  o Sleep Onset Delay -Mild delays were observed on weekends.  o Sleep Periods - Consolidated.  o Light exposure periods - Appropriately timed to sleep schedule  o Napping -Noted on 0-1 days per week.  Actigraphy Interpretation: Sleep wake pattern is consistent with:  o Regular sleep pattern with sufficient sleep    Assessment:     Decreased sleep latency, increased REM latency.  Increased N2, decreased N3.  Supine REM was observed.  Noted by technologist to be easily aroused from sleep by outside noise.    No sleep-disordered breathing (AHI 0.1) without sleep-associated hypoxemia.    Rare PLM s observed.    Actigraphy consistent with regular sleep pattern with sufficient total sleep time.    Recommendations:    Patient to stay for MSLT.    Advice regarding the risks of drowsy driving.    Suggest optimizing sleep schedule and avoiding sleep deprivation.    Pharmacologic therapy should be used for management of restless legs syndrome only if present and clinically indicated and not based on the presence of periodic limb movements alone.    Diagnostic Codes:   Unspecified Sleep Disturbance G47.9  Hypersomnia, Unspecified F51.11     _____________________________________   Electronically Signed By: Gigi Boothe MD (1/4/2022)         MSLT:  Urine Toxicology Screen:  Ordered, but was not collected.  Nap 1: sleep latency 20 minutes with 0 SOREM (noted that patient had fallen asleep prior to this nap and had to be awoken to start nap)  Nap 2: sleep latency 7 minutes with 0 SOREM  Nap 3: sleep latency 6 minutes with 0 SOREM  Nap 4: sleep latency 17.5 minutes with 1 SOREM  Nap 5: sleep latency 0.5 minutes with 1 SOREM  Mean sleep latency of 10.5 minutes with a total of 2 SOREM's on all 5 naps.  If exclude first nap, mean sleep latency 7.75 minutes with 2 SOREM's.

## 2022-01-04 NOTE — PATIENT INSTRUCTIONS
Your Body mass index is 18.25 kg/m .  Weight management is a personal decision.  If you are interested in exploring weight loss strategies, the following discussion covers the approaches that may be successful. Body mass index (BMI) is one way to tell whether you are at a healthy weight, overweight, or obese. It measures your weight in relation to your height.  A BMI of 18.5 to 24.9 is in the healthy range. A person with a BMI of 25 to 29.9 is considered overweight, and someone with a BMI of 30 or greater is considered obese. More than two-thirds of American adults are considered overweight or obese.  Being overweight or obese increases the risk for further weight gain. Excess weight may lead to heart disease and diabetes.  Creating and following plans for healthy eating and physical activity may help you improve your health.  Weight control is part of healthy lifestyle and includes exercise, emotional health, and healthy eating habits. Careful eating habits lifelong are the mainstay of weight control. Though there are significant health benefits from weight loss, long-term weight loss with diet alone may be very difficult to achieve- studies show long-term success with dietary management in less than 10% of people. Attaining a healthy weight may be especially difficult to achieve in those with severe obesity. In some cases, medications, devices and surgical management might be considered.  What can you do?  If you are overweight or obese and are interested in methods for weight loss, you should discuss this with your provider.     Consider reducing daily calorie intake by 500 calories.     Keep a food journal.     Avoiding skipping meals, consider cutting portions instead.    Diet combined with exercise helps maintain muscle while optimizing fat loss. Strength training is particularly important for building and maintaining muscle mass. Exercise helps reduce stress, increase energy, and improves fitness.  Increasing exercise without diet control, however, may not burn enough calories to loose weight.       Start walking three days a week 10-20 minutes at a time    Work towards walking thirty minutes five days a week     Eventually, increase the speed of your walking for 1-2 minutes at time    In addition, we recommend that you review healthy lifestyles and methods for weight loss available through the National Institutes of Health patient information sites:  http://win.niddk.nih.gov/publications/index.htm    And look into health and wellness programs that may be available through your health insurance provider, employer, local community center, or jaki club.

## 2022-01-04 NOTE — PROGRESS NOTES
"Jessica Mello is a 41 year old female who is being evaluated via a billable video visit.       The patient has been notified of following:      \"This video visit will be conducted via a call between you and your physician/provider. We have found that certain health care needs can be provided without the need for an in-person physical exam.  This service lets us provide the care you need with a video conversation.  If a prescription is necessary we can send it directly to your pharmacy.  If lab work is needed we can place an order for that and you can then stop by our lab to have the test done at a later time.     Video visits are billed at different rates depending on your insurance coverage.  Please reach out to your insurance provider with any questions.     If during the course of the call the physician/provider feels a video visit is not appropriate, you will not be charged for this service.\"     Patient has given verbal consent for Video visit? Yes  How would you like to obtain your AVS? Mail a copy  If you are dropped from the video visit, the video invite should be resent to: Text to cell phone: -  Will anyone else be joining your video visit? No  If patient encounters technical issues they should call [phone]      Video-Visit Details     Type of service:  Video Visit     Video Start Time: 10am  Video End Time: 10:35am    Originating Location (pt. Location): Home     Distant Location (provider location):  [facility]      Platform used for Video Visit: [employer]    Virtual visit for review of actigraphy, PSG, MSLT results.     Assessment:  - Diagnostic challenges of patient falling asleep before first nap and needed to be awoken to start nap, and urine toxicology was ordered but not collected.   - If we exclude the first nap, mean sleep latency is < 8 minutes and 2 SOREM's, this combined with clinical history consistent with organic hypersomnia, my final diagnosis is " narcolepsy without cataplexy.    Plan:  -We discussed the pathophysiology of narcolepsy without cataplexy, including different pharmacotherapy options, strategic napping, importance of adequate sleep time consistently.  -We agreed to start trial of modafinil 100-200 mg every morning and [facility].    SUBJECTIVE:  Jessica Mello is a 41 year old female.    Pertinent PMHx of cigarette smoking, MDD, anxiety, fatigue / hypersomnia.     Prior sleep testing:  HST on 8/17/2021 with analysis time 489.5 minutes, AHI 1.7, baseline SpO2 96%, reica SpO2 79%, <= 88% of 0.2 minutes.     Most recent visit with Dr. Hewitt on 9/9/2021 to review sleep testing results.  Primary concern of hypersomnia.  No reported benefit from trial of anti-depressant.  Recommended to proceed with actigraphy, PSG, MSLT.     Reviewed recent visit with her primary physician, Dr. Alvarez on 10/7/2021.  Reported was upset about urine toxicology being ordered at last visit with sleep clinic.  Thorough lab work-up negative to date, plan for referral to psychiatry and follow-up with our sleep clinic.  Referral to rheumatology given elevated KEIRY with nucleolar staining pattern.     Normal lab results for: CMP, albumin, pre-albumin, bilirubin direct, CRP, HDL, LDL, triglycerides, TSH, vit B12, bit D, CBC, ESR, lyme Ab.  Low normal ferritin at 20.  KEIRY returned positive (1:320) with nucleolar pattern.  Did not see results for urine toxicology.     10/19/2021 -we reviewed her interim history.  As noted above, in general thorough blood work has been negative outside of a positive KEIRY with a nucleolar pattern.  She is scheduled to see rheumatology in January.  She is at her sleepiness started [id number], and while initially was more mild fatigue feels like it is progressed now to having significant daytime exhaustion and inappropriate sleepiness.  When allowed to sleep ad caitlyn., she was she could sleep 10 or  "more hours.     She has moved up to [place] to help her grandfather after the recent passing of her grandmother.  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     Most nights in bed around 9:30 PM, will sleep quickly.  She may have 1-2 awakenings, these may be as long as 30-60 minutes.  She can sleep potentially as late as 8:30 AM, but says may wake up up 5-6 AM and had difficulty returning to sleep.  She seems to report taking often 1 or 2 naps daily, usually for 15 minutes.     She does seem to report some sleep paralysis.  But largely denies any hypnagogic or hypnopompic hallucinations, denies cataplexy.    A/P for actigraphy, PSG, MSLT.    Today - We reviewed her actigraphy, PSG, MSLT results in detail.  Diagnostic challenges of patient falling asleep before first nap and needed to be awoken to start nap, and urine toxicology was ordered but not collected.      Insomnia Severity Index    Difficulty falling asleep 0    Difficulty staying asleep 1    Problems waking up too early 1    How SATISFIED/DISSATISFIED are you with your CURRENT sleep pattern? 4    How NOTICEABLE to others do you think your sleep problem is in terms of impairing the quality of your life? 4    How WORRIED/DISTRESSED are you about your current sleep problem? 4    To what extent do you consider your sleep problem to INTERFERE with your daily functioning (e.g. daytime fatigue, mood, ability to function at work/daily chores, concentration, memory, mood, etc.) CURRENTLY? 4    Total Score 18       SLEEP STUDY INTERPRETATION  DIAGNOSTIC POLYSOMNOGRAPHY REPORT      Patient: CORINNA RAMIREZ  YOB: 1980  Study Date: 12/21/2021  MRN: [id number]  Referring Provider: LU CLARK MD  Ordering Provider: LEAH FIERRO MD    Indications for Polysomnography: The patient is a 41 year old Female who is 5' 3\" and weighs 108.0 lbs. Her BMI is 19.1, [place] sleepiness scale - and neck " circumference is 14.75 cm. Relevant medical history includes cigarette smoking, MDD, anxiety, fatigue / hypersomnia. A diagnostic polysomnogram was performed to evaluate for sleep apnea.    Polysomnogram Data: A full night polysomnogram recorded the standard physiologic parameters including EEG, EOG, EMG, ECG, nasal and oral airflow. Respiratory parameters of chest and abdominal movements were recorded with respiratory inductance plethysmography. Oxygen saturation was recorded by pulse oximetry. Hypopnea scoring rule used: 1B 4%.    Sleep Architecture: Decreased sleep latency, increased REM latency.  Increased N2, decreased N3.  Supine REM was observed.  Noted by technologist to be easily aroused from sleep by outside noise.  The total recording time of the polysomnogram was 479.3 minutes. The total sleep time was 433.5 minutes. Sleep latency was decreased at 2.6 minutes without the use of a sleep aid. REM latency was 132.0 minutes. Arousal index was normal at 6.9 arousals per hour. Sleep efficiency was normal at 90.5%. Wake after sleep onset was 39.5 minutes. The patient spent 4.3% of total sleep time in Stage N1, 65.7% in Stage N2, 4.4% in Stage N3, and 25.6% in REM. Time in REM supine was 23.5 minutes.    Respiration: No sleep-disordered breathing (AHI 0.1) without sleep-associated hypoxemia.    Events ? The polysomnogram revealed a presence of - obstructive, - central, and - mixed apneas resulting in an apnea index of - events per hour. There were 1 obstructive hypopneas and - central hypopneas resulting in an obstructive hypopnea index of 0.1 and central hypopnea index of - events per hour. The combined apnea/hypopnea index was 0.1 events per hour (central apnea/hypopnea index was - events per hour). The REM AHI was - events per hour. The supine AHI was 0.3 events per hour. The RERA index was - events per hour.  The RDI was 0.1 events per hour.    Snoring - was reported as absent.    Respiratory rate and  pattern - was notable for normal respiratory rate and pattern.    Sustained Sleep Associated Hypoventilation - End-tidal carbon dioxide monitoring was not used, however significant hypoventilation was not suggested by oximetry.    Sleep Associated Hypoxemia - (Greater than 5 minutes O2 sat at or below 88%) was not present. Baseline oxygen saturation was 97.1%. Lowest oxygen saturation was 82.0%. Time spent less than or equal to 88% was 0 minutes. Time spent less than or equal to 89% was 0 minutes.    Movement Activity: Rare PLM s observed.    Periodic Limb Activity - There were 6 PLMs during the entire study. The PLM index was 0.8 movements per hour. The PLM Arousal Index was 0.8 per hour.    REM EMG Activity - Excessive transient/sustained muscle activity was not present.    Nocturnal Behavior - Abnormal sleep related behaviors were not noted during/arising out of NREM / REM sleep.     Bruxism - None apparent.    Cardiac Summary: Appears NSR  The average pulse rate was - bpm. The minimum pulse rate was - bpm while the maximum pulse rate was - bpm.  Arrhythmias were/were not noted.    Pre PSG Evaluation: regular sleep pattern with sufficient total sleep time    Sleep Log - Consistent with actigraphy.    Actigraphy -   o Dates of recording - From 12/5/2021 to 12/21/2021.  o Quality - Good.  o Sleep diary - Correlates well/poorly.  o Bed Time - Average at 9:45pm.  o Get Up Time - Average at 7am.  o Time in Bed - Average at 9.25 hours.  o Total Sleep Time - Average at 8 hours.  - Minimum 5 hours.  - Maximum 9.5 hours.  o Sleep Onset Delay -Mild delays were observed on weekends.  o Sleep Periods - Consolidated.  o Light exposure periods - Appropriately timed to sleep schedule  o Napping -Noted on 0-1 days per week.  Actigraphy Interpretation: Sleep wake pattern is consistent with:  o Regular sleep pattern with sufficient sleep    Assessment:     Decreased sleep latency, increased REM latency.  Increased N2, decreased  N3.  Supine REM was observed.  Noted by technologist to be easily aroused from sleep by outside noise.    No sleep-disordered breathing (AHI 0.1) without sleep-associated hypoxemia.    Rare PLM s observed.    Actigraphy consistent with regular sleep pattern with sufficient total sleep time.    Recommendations:    Patient to stay for MSLT.    Advice regarding the risks of drowsy driving.    Suggest optimizing sleep schedule and avoiding sleep deprivation.    Pharmacologic therapy should be used for management of restless legs syndrome only if present and clinically indicated and not based on the presence of periodic limb movements alone.    Diagnostic Codes:   Unspecified Sleep Disturbance G47.9  Hypersomnia, Unspecified F51.11     _____________________________________   Electronically Signed By: Gigi Boothe MD (1/4/2022)         MSLT:  Urine Toxicology Screen:  Ordered, but was not collected.  Nap 1: sleep latency 20 minutes with 0 SOREM (noted that patient had fallen asleep prior to this nap and had to be awoken to start nap)  Nap 2: sleep latency 7 minutes with 0 SOREM  Nap 3: sleep latency 6 minutes with 0 SOREM  Nap 4: sleep latency 17.5 minutes with 1 SOREM  Nap 5: sleep latency 0.5 minutes with 1 SOREM  Mean sleep latency of 10.5 minutes with a total of 2 SOREM's on all 5 naps.  If exclude first nap, mean sleep latency 7.75 minutes with 2 SOREM's.          Past medical history:    Patient Active Problem List    Diagnosis Date Noted     Smoker 06/04/2021     Priority: Medium     Other depression 06/04/2021     Priority: Medium     Anxiety 06/04/2021     Priority: Medium     Fatigue, unspecified type 06/04/2021     Priority: Medium       10 point ROS of systems including Constitutional, Eyes, Respiratory, Cardiovascular, Gastroenterology, Genitourinary, Integumentary, Muscularskeletal, Psychiatric were all negative except for pertinent positives noted in my HPI.    Current Outpatient Medications    Medication Sig Dispense Refill     FLUoxetine (PROZAC) 20 MG capsule Take 1 capsule (20 mg) by mouth daily 90 capsule 0     VITAMIN D PO Take 2,000 Int'l Units by mouth daily         OBJECTIVE:  There were no vitals taken for this visit.    Physical Exam     ---  This note was written with the assistance of the Dragon voice-dictation technology software. The final document, although reviewed, may contain errors. For corrections, please contact the office.    Gigi Boothe MD    Sleep Medicine  [facility]  ([phone])  [facility]  ([phone])    Time spent on the date of service:  40 minutes.

## 2022-01-10 ENCOUNTER — MYC MEDICAL ADVICE (OUTPATIENT)
Dept: SLEEP MEDICINE | Facility: HOSPITAL | Age: 42
End: 2022-01-10
Payer: COMMERCIAL

## 2022-01-14 ENCOUNTER — MYC MEDICAL ADVICE (OUTPATIENT)
Dept: SLEEP MEDICINE | Facility: CLINIC | Age: 42
End: 2022-01-14
Payer: COMMERCIAL

## 2022-01-14 DIAGNOSIS — G47.419 NARCOLEPSY WITHOUT CATAPLEXY(347.00): Primary | ICD-10-CM

## 2022-01-14 RX ORDER — METHYLPHENIDATE HYDROCHLORIDE 10 MG/1
TABLET ORAL
Qty: 90 TABLET | Refills: 0 | Status: SHIPPED | OUTPATIENT
Start: 2022-01-14 | End: 2022-02-24

## 2022-02-22 ENCOUNTER — TELEPHONE (OUTPATIENT)
Dept: SLEEP MEDICINE | Facility: HOSPITAL | Age: 42
End: 2022-02-22
Payer: COMMERCIAL

## 2022-02-22 ASSESSMENT — ANXIETY QUESTIONNAIRES
4. TROUBLE RELAXING: MORE THAN HALF THE DAYS
1. FEELING NERVOUS, ANXIOUS, OR ON EDGE: MORE THAN HALF THE DAYS
GAD7 TOTAL SCORE: 12
GAD7 TOTAL SCORE: 12
7. FEELING AFRAID AS IF SOMETHING AWFUL MIGHT HAPPEN: SEVERAL DAYS
2. NOT BEING ABLE TO STOP OR CONTROL WORRYING: NEARLY EVERY DAY
6. BECOMING EASILY ANNOYED OR IRRITABLE: SEVERAL DAYS
7. FEELING AFRAID AS IF SOMETHING AWFUL MIGHT HAPPEN: SEVERAL DAYS
3. WORRYING TOO MUCH ABOUT DIFFERENT THINGS: SEVERAL DAYS
5. BEING SO RESTLESS THAT IT IS HARD TO SIT STILL: MORE THAN HALF THE DAYS
GAD7 TOTAL SCORE: 12

## 2022-02-23 ENCOUNTER — VIRTUAL VISIT (OUTPATIENT)
Dept: RHEUMATOLOGY | Facility: CLINIC | Age: 42
End: 2022-02-23
Attending: FAMILY MEDICINE
Payer: COMMERCIAL

## 2022-02-23 DIAGNOSIS — R68.2 DRY MOUTH: ICD-10-CM

## 2022-02-23 DIAGNOSIS — H04.123 DRY EYES: ICD-10-CM

## 2022-02-23 DIAGNOSIS — M54.50 CHRONIC MIDLINE LOW BACK PAIN WITHOUT SCIATICA: ICD-10-CM

## 2022-02-23 DIAGNOSIS — G89.29 CHRONIC RIGHT SHOULDER PAIN: ICD-10-CM

## 2022-02-23 DIAGNOSIS — R76.8 ELEVATED ANTINUCLEAR ANTIBODY (ANA) LEVEL: Primary | ICD-10-CM

## 2022-02-23 DIAGNOSIS — R53.83 FATIGUE, UNSPECIFIED TYPE: ICD-10-CM

## 2022-02-23 DIAGNOSIS — G89.29 CHRONIC MIDLINE LOW BACK PAIN WITHOUT SCIATICA: ICD-10-CM

## 2022-02-23 DIAGNOSIS — M25.511 CHRONIC RIGHT SHOULDER PAIN: ICD-10-CM

## 2022-02-23 PROCEDURE — 99204 OFFICE O/P NEW MOD 45 MIN: CPT | Mod: 95 | Performed by: INTERNAL MEDICINE

## 2022-02-23 ASSESSMENT — PATIENT HEALTH QUESTIONNAIRE - PHQ9
SUM OF ALL RESPONSES TO PHQ QUESTIONS 1-9: 17
SUM OF ALL RESPONSES TO PHQ QUESTIONS 1-9: 17
10. IF YOU CHECKED OFF ANY PROBLEMS, HOW DIFFICULT HAVE THESE PROBLEMS MADE IT FOR YOU TO DO YOUR WORK, TAKE CARE OF THINGS AT HOME, OR GET ALONG WITH OTHER PEOPLE: EXTREMELY DIFFICULT
SUM OF ALL RESPONSES TO PHQ QUESTIONS 1-9: 17
10. IF YOU CHECKED OFF ANY PROBLEMS, HOW DIFFICULT HAVE THESE PROBLEMS MADE IT FOR YOU TO DO YOUR WORK, TAKE CARE OF THINGS AT HOME, OR GET ALONG WITH OTHER PEOPLE: EXTREMELY DIFFICULT
SUM OF ALL RESPONSES TO PHQ QUESTIONS 1-9: 17

## 2022-02-23 ASSESSMENT — ANXIETY QUESTIONNAIRES: GAD7 TOTAL SCORE: 12
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
## 2022-02-23 NOTE — PATIENT INSTRUCTIONS
RHEUMATOLOGY    Dr. Jordan Louis    [address] [place] [address]  Phone number: [phone]  Fax number: [phone]      Thank you for choosing [facility]!    Jacy Dahl CMA Rheumatology

## 2022-02-24 ENCOUNTER — VIRTUAL VISIT (OUTPATIENT)
Dept: BEHAVIORAL HEALTH | Facility: CLINIC | Age: 42
End: 2022-02-24
Payer: COMMERCIAL

## 2022-02-24 ENCOUNTER — MYC REFILL (OUTPATIENT)
Dept: SLEEP MEDICINE | Facility: CLINIC | Age: 42
End: 2022-02-24
Payer: COMMERCIAL

## 2022-02-24 ENCOUNTER — VIRTUAL VISIT (OUTPATIENT)
Dept: PSYCHIATRY | Facility: CLINIC | Age: 42
End: 2022-02-24
Payer: COMMERCIAL

## 2022-02-24 VITALS — BODY MASS INDEX: 18.42 KG/M2 | WEIGHT: 104 LBS

## 2022-02-24 DIAGNOSIS — G47.419 NARCOLEPSY WITHOUT CATAPLEXY(347.00): ICD-10-CM

## 2022-02-24 DIAGNOSIS — R53.83 FATIGUE, UNSPECIFIED TYPE: Primary | ICD-10-CM

## 2022-02-24 DIAGNOSIS — F33.0 MILD RECURRENT MAJOR DEPRESSION (H): Primary | ICD-10-CM

## 2022-02-24 PROCEDURE — 90832 PSYTX W PT 30 MINUTES: CPT | Mod: 95 | Performed by: SOCIAL WORKER

## 2022-02-24 PROCEDURE — 99214 OFFICE O/P EST MOD 30 MIN: CPT | Mod: 95 | Performed by: PSYCHIATRY & NEUROLOGY

## 2022-02-24 ASSESSMENT — PATIENT HEALTH QUESTIONNAIRE - PHQ9
SUM OF ALL RESPONSES TO PHQ QUESTIONS 1-9: 17
SUM OF ALL RESPONSES TO PHQ QUESTIONS 1-9: 17

## 2022-02-24 NOTE — PATIENT INSTRUCTIONS
Discontinue fluoxetine, if tolerated. If your mood worsens, you can restart it.     Continue all other medications per your primary care provider.    Schedule a follow up appointment with me if  needed.  You may call [facility] at [phone] to schedule.    [facility]:      Go to the Emergency Department as needed or call after hours crisis line at [phone].      To schedule individual or family therapy, call [facility] at [phone].     Follow up with primary care provider as planned or sooner for acute medical concerns.    Call the psychiatric nurse line with medication questions or concerns at [phone].    [facility] may be used to communicate with your provider, but this is not intended to be used for emergencies.    Community Resources:      National Suicide Prevention Lifeline: 725.836.6808 (TTY: 847.673.2302). Call anytime for help.  (www.suicidepreventionlifeline.org)    National Hope on Mental Illness (www.nelson.org): 606.411.3650 or 759-660-3821.     Mental Health Association (www.mentalhealth.org): 581.468.8027 or 978-829-3010.    [place] Crisis Text Line: Text [place] to 908496    Suicide LifeLine Chat: suicidepreventionlifeline.org/chat

## 2022-02-24 NOTE — Clinical Note
Uday Koenig,  I sent Jessica again today.  As she has been diagnosed with narcolepsy, and has a positive KEIRY, so will be seeing the rheumatologist.  I think her fatigue is most likely due to these conditions rather than depression or other psychiatric diagnosis.  I will be returning her to your care, but she is welcome to come back to see me if she needs to in the future.    Please feel free to contact me with questions or concerns.  Thank you for the opportunity to be involved in the care of this patient.    Regards,  Lucy Escobar MD  Collaborative Care Psychiatry  [facility]

## 2022-02-24 NOTE — PROGRESS NOTES
[facility] Collaborative Care Psychiatry Service  February 24, 2022    Behavioral Health Clinician Progress Note    Patient Name: Jessica Mello      Telemedicine Visit: The patient's condition can be safely assessed and treated via synchronous audio and visual telemedicine encounter.      Reason for Telemedicine Visit: Services only offered telehealth    Originating Site (Patient Location): Patient's home    Distant Site (Provider Location): Provider Remote Setting- Home Office    Consent:  The patient/guardian has verbally consented to: the potential risks and benefits of telemedicine (video visit) versus in person care; bill my insurance or make self-payment for services provided; and responsibility for payment of non-covered services.     Mode of Communication:  Video Conference via [employer].    As the provider I attest to compliance with applicable laws and regulations related to telemedicine.         Service Type:  Individual      Service Location:   [employer] / Email (patient reached)     Session Start Time: 1:15p  Session End Time: 1:38p      Session Length: 16 - 37      Attendees: Client    Visit Activities (Refresh list every visit): [facility] Only    Diagnostic Assessment Date: 11/24/21  Treatment Plan Review Date: 5/24/22  See Flowsheets for today's PHQ-9 and BYRON-7 results  Previous PHQ-9:   PHQ-9 SCORE 11/23/2021 2/23/2022 2/23/2022   PHQ-9 Total Score [facility] 15 (Moderately severe depression) - 17 (Moderately severe depression)   PHQ-9 Total Score 15 17 17     Previous BYRON-7:   BYRON-7 SCORE 10/7/2021 11/23/2021 2/22/2022   Total Score 8 (mild anxiety) 12 (moderate anxiety) 12 (moderate anxiety)   Total Score 8 12 12         DATA  Extended Session (60+ minutes): No  Interactive Complexity: No  Crisis: No  [facility] Patient: No    Treatment Objective(s) Addressed in This Session:  Target Behavior(s): fatigue    Depressed Mood: Feel less tired and more energy during the day     Current Stressors /  Issues:  Pt shares that she is continuing to struggle.  Did have a sleep study in January and was dx'd with narcolepsy.  Tried a few medications such as modafinil and ritalin but had pretty significant side effects to them. Has not had great responses via my chart or calling the sleep clinic to discuss next steps and is feeling frustrated.  Pt shares no changes in mood since last visit and most questions on PHQ-9 she feels are related to her sleep issues.  Had a rheumatology visit yesterday and orders for labs were put in.  Feels she is too tired to exercise and feels weak but when does push herself and does exercise, she is sore for days.    Work is ok but January was very stressful.  Has a hard time getting through the day. Tries to get into the office a couple of times a week thinking that might help but hasn't noticed much difference.  Admits that she is hard on herself and finding it so hard to get through the work day and shares that she is very worried she will make mistakes so double and triple checks everything.    Went out recently with friends and was so tired for days following it that she is not sure it was worth it.  Does have a friend that stops over regularly to pt's home.        Progress on Treatment Objective(s) / Homework:  Minimal progress - ACTION (Actively working towards change); Intervened by reinforcing change plan / affirming steps taken    Motivational Interviewing    MI Intervention: Expressed Empathy/Understanding, Open-ended questions, Change talk (evoked) and Reframe     Change Talk Expressed by the Patient: Committment to change Activation Taking steps    Provider Response to Change Talk: E - Evoked more info from patient about behavior change, A - Affirmed patient's thoughts, decisions, or attempts at behavior change, R - Reflected patient's change talk and S - Summarized patient's change talk statements      Care Plan review completed: No    Medication Review:  No changes to current  psychiatric medication(s)    Medication Compliance:  Yes    Changes in Health Issues:   Yes: recent dx of narcolepsy and is working with rheumatology, Associated Psychological Distress-   Chemical Use Review:   Substance Use: Chemical use reviewed, no active concerns identified      Tobacco Use: No change in amount of tobacco use since last session.  Provided encouragement to quit     Assessment: Current Emotional / Mental Status (status of significant symptoms):  Risk status (Self / Other harm or suicidal ideation)  Patient denies a history of suicidal ideation, suicide attempts, self-injurious behavior, homicidal ideation, homicidal behavior and and other safety concerns  Patient denies current fears or concerns for personal safety.  Patient denies current or recent suicidal ideation or behaviors.  Patient denies current or recent homicidal ideation or behaviors.  Patient denies current or recent self injurious behavior or ideation.  Patient denies other safety concerns.  A safety and risk management plan has not been developed at this time, however patient was encouraged to call [place] [phone] should there be a change in any of these risk factors.    Appearance:   Appropriate   Eye Contact:   Good   Psychomotor Behavior: Normal   Attitude:   Cooperative   Orientation:   All  Speech   Rate / Production: Normal/ Responsive Normal    Volume:  Normal   Mood:    Normal  Affect:    Appropriate   Thought Content:  Clear   Thought Form:  Coherent  Logical   Insight:    Good     Diagnoses:  1. Mild recurrent major depression (H)    2. Narcolepsy without cataplexy(347.00)        Collateral Reports Completed:  [facility]    Plan: (Homework, other):  Patient was given information about behavioral services and instructed to schedule a follow up appointment with the [facility].  She was also given information about mental health symptoms and treatment options .   CD Recommendations: No indications of CD issues.  TARAS Roth, [facility]   Pt will be transferred back to PCP to continue to work on medications for chronic pain and recent narcolepsy dx.      WAQAS Xie  February 24, 2022

## 2022-02-24 NOTE — PROGRESS NOTES
Telemedicine Visit: The patient's condition can be safely assessed and treated via synchronous audio and visual telemedicine encounter.      Reason for Telemedicine Visit: Services only offered telehealth    Originating Site (Patient Location): Patient's home    Distant Site (Provider Location): Provider Remote Setting- Home Office    Consent:  The patient/guardian has verbally consented to: the potential risks and benefits of telemedicine (video visit) versus in person care; bill my insurance or make self-payment for services provided; and responsibility for payment of non-covered services.     Mode of Communication:  Video Conference via [employer]    As the provider I attest to compliance with applicable laws and regulations related to telemedicine.      Jessica is a 41 year old who is being evaluated via a billable video visit.      How would you like to obtain your AVS? MyChart  If the video visit is dropped, the invitation should be resent by: Text to cell phone: [phone]  Will anyone else be joining your video visit? No          Outpatient Psychiatric Progress Note    Name: Jessica Fosscobasilio   : 1980                    Primary Care Provider: Liberty Alvarez MD   Therapist: None     PHQ-9 scores:  PHQ-9 SCORE 2021   PHQ-9 Total Score [facility] Hospital Association[facility] 15 (Moderately severe depression) - 17 (Moderately severe depression)   PHQ-9 Total Score 15 17 17       BYRON-7 scores:  BYRON-7 SCORE 10/7/2021 2021 2022   Total Score 8 (mild anxiety) 12 (moderate anxiety) 12 (moderate anxiety)   Total Score 8 12 12       Patient Identification:  Jessica is a 41 year old year old female  who presents for return visit with me.  Patient attended the session alone.     Interim History:  Per Debbie Man, LICSW:  Pt shares that she is continuing to struggle. Did have a sleep study in January and was dx'd with narcolepsy. Tried a few medications such as modafinil and  ritalin but had pretty significant side effects to them. Has not had great responses via my chart or calling the sleep clinic to discuss next steps and is feeling frustrated.  Pt shares no changes in mood since last visit and most questions on PHQ-9 she feels are related to her sleep issues.  Had a rheumatology visit yesterday and orders for labs were put in. Feels she is too tired to exercise and feels weak but when does push herself and does exercise, she is sore for days.  Work is ok but January was very stressful. Has a hard time getting through the day. Tries to get into the office a couple of times a week thinking that might help but hasn't noticed much difference. Admits that she is hard on herself and finding it so hard to get through the work day and shares that she is very worried she will make mistakes so double and triple checks everything.  Went out recently with friends and was so tired for days following it that she is not sure it was worth it. Does have a friend that stops over regularly to pt's home.    Jessica continues to struggle with significant fatigue.  As above, she was diagnosed with narcolepsy without cataplexy after doing a sleep study.  She tried modafinil, but says that it made her feel extremely depressed.  She has tried methylphenidate, but is having a hard time adjusting the dose so that she has some benefit without adverse side effects.  She is getting a little bit frustrated about finding the appropriate treatment.    She does not really endorse depression, although she is still is fairly high on the PHQ-9.  Most of her increase in score is due to her persistent fatigue.  She cut her fluoxetine from 40 mg a day to 20 mg a day, and has not noticed an increase in depressive symptoms.  She is still wondering if it might be causing some of her fatigue.  She can certainly discontinue it.  If her mood worsens she can restart it.    At this point, I do not think her issues are really  psychiatric.  She will not reschedule an appointment with me, but can certainly come back in the future if she feels it is necessary.    Vital Signs:   Wt 47.2 kg (104 lb)   LMP 02/07/2022   Breastfeeding No   BMI 18.42 kg/m        Current Medications:  Current Outpatient Medications   Medication     FLUoxetine (PROZAC) 20 MG capsule     methylphenidate (RITALIN) 10 MG tablet     VITAMIN D PO     No current facility-administered medications for this visit.        The [place] Prescription Monitoring Program has been reviewed and there are no concerns about diversionary activity for controlled substances at this time.      Mental Status Examination:  Jessica is a 41-year-old woman in mild emotional distress.  Speech is clear and normal in rate and tone.  Eye contact is good over the video connection.  She is neatly groomed in casual clothing.  Motor behavior is appropriate.  Affect is full.  She is briefly tearful, but reports that she is more frustrated than sad or depressed.  Thoughts are logical and spontaneous with no loose associations or flight of ideas.  Thought content shows no psychosis.  She is alert and oriented x3.    Assessment and Plan:    ICD-10-CM    1. Fatigue, unspecified type  R53.83        Medical comorbidities include:   Patient Active Problem List   Diagnosis     Smoker     Other depression     Anxiety     Fatigue, unspecified type     Narcolepsy without cataplexy(347.00)       Treatment Plan:  Patient Instructions   Discontinue fluoxetine, if tolerated. If your mood worsens, you can restart it.     Continue all other medications per your primary care provider.    Schedule a follow up appointment with me if  needed.  You may call [facility] at [phone] to schedule.    [facility] Resources:      Go to the Emergency Department as needed or call after hours crisis line at [phone].      To schedule individual or family therapy, call [facility] at  [phone].     Follow up with primary care provider as planned or sooner for acute medical concerns.    Call the psychiatric nurse line with medication questions or concerns at [phone].    [employer] may be used to communicate with your provider, but this is not intended to be used for emergencies.    Community Resources:      National Suicide Prevention Lifeline: 799.655.8521 (TTY: 843.336.6701). Call anytime for help.  (www.suicidepreventionlifeline.org)    National [place] on Mental Illness (www.enlson.org): 239.914.8307 or 689-810-3921.     Mental Health Association (www.mentalhealth.org): 526.528.4157 or 538-229-2939.    [place] Crisis Text Line: Text [place] to 615103    Suicide LifeLine Chat: suicideTripTouchline.org/chat         Administrative Billing:   Video call duration: 20 minutes   Start: 1:47 PM   Stop: 2:07 PM   Total time spent, including chart review and documentation: 33 minutes    Patient Status:  The patient is being returned to the referring provider for ongoing care and medication prescribing.  The patient can be referred back to this service for further consultation as needed.

## 2022-02-24 NOTE — TELEPHONE ENCOUNTER
methylphenidate (RITALIN) 10 MG tablet    Last Written Prescription Date:  1/14/2022  Last Fill Quantity: 90,   # refills: 0  Last Office Visit: 1/4/2022  Future Office visit:       Routing refill request to provider for review/approval because:  Drug not on the [facility], P or [facility] refill protocol or controlled substance

## 2022-02-25 RX ORDER — METHYLPHENIDATE HYDROCHLORIDE 10 MG/1
TABLET ORAL
Qty: 90 TABLET | Refills: 0 | Status: SHIPPED | OUTPATIENT
Start: 2022-02-25 | End: 2022-03-18

## 2022-03-02 ENCOUNTER — E-VISIT (OUTPATIENT)
Dept: FAMILY MEDICINE | Facility: CLINIC | Age: 42
End: 2022-03-02
Payer: COMMERCIAL

## 2022-03-02 DIAGNOSIS — M25.539 PAIN IN WRIST, UNSPECIFIED LATERALITY: Primary | ICD-10-CM

## 2022-03-02 DIAGNOSIS — R53.83 FATIGUE, UNSPECIFIED TYPE: ICD-10-CM

## 2022-03-02 PROCEDURE — 99207 PR NON-BILLABLE SERV PER CHARTING: CPT | Performed by: FAMILY MEDICINE

## 2022-03-02 NOTE — PATIENT INSTRUCTIONS
Thank you for choosing us for your care. I think an in-clinic visit to examine your wrist and talk through your other concerns would be best next steps based on your symptoms. Please schedule a clinic appointment; you won t be charged for this eVisit.      You can schedule an appointment right here in [facility], or call [phone]

## 2022-03-02 NOTE — Clinical Note
Please assist patient with scheduling an in-person eval for wrist pain and fatigue. Ok for same day slot if needed.

## 2022-03-05 ASSESSMENT — PATIENT HEALTH QUESTIONNAIRE - PHQ9
SUM OF ALL RESPONSES TO PHQ QUESTIONS 1-9: 20
10. IF YOU CHECKED OFF ANY PROBLEMS, HOW DIFFICULT HAVE THESE PROBLEMS MADE IT FOR YOU TO DO YOUR WORK, TAKE CARE OF THINGS AT HOME, OR GET ALONG WITH OTHER PEOPLE: EXTREMELY DIFFICULT
SUM OF ALL RESPONSES TO PHQ QUESTIONS 1-9: 20

## 2022-03-06 ASSESSMENT — PATIENT HEALTH QUESTIONNAIRE - PHQ9: SUM OF ALL RESPONSES TO PHQ QUESTIONS 1-9: 20

## 2022-03-07 ENCOUNTER — PATIENT OUTREACH (OUTPATIENT)
Dept: CARE COORDINATION | Facility: CLINIC | Age: 42
End: 2022-03-07
Payer: COMMERCIAL

## 2022-03-07 ENCOUNTER — TELEPHONE (OUTPATIENT)
Dept: FAMILY MEDICINE | Facility: CLINIC | Age: 42
End: 2022-03-07
Payer: COMMERCIAL

## 2022-03-07 DIAGNOSIS — F32.1 MODERATE MAJOR DEPRESSION (H): ICD-10-CM

## 2022-03-07 DIAGNOSIS — G47.19 EXCESSIVE DAYTIME SLEEPINESS: ICD-10-CM

## 2022-03-07 DIAGNOSIS — R53.83 FATIGUE, UNSPECIFIED TYPE: Primary | ICD-10-CM

## 2022-03-07 DIAGNOSIS — F41.9 ANXIETY: ICD-10-CM

## 2022-03-07 NOTE — PROGRESS NOTES
Clinic Care Coordination Contact  Community Health Worker Initial Outreach    Spoke to Patient (Jessica)    CHW Introduced intent of call regarding PCP referral    Patients reports she is doing okay. CHW acknowledged.      CHW confirms upcoming appointment; Lab Appointment 03/09/2022 10:00AM LAB [facility]. Patient acknowledged.      CHW introduce Clinic Care Coordination and Patient expressed interest in wanting to enroll into CCC Services and expressed that she would benefit with working with an CCRN. And expressed she needs support with finding effective treatment to her narcolepsy and sleep problems. CHW acknowledged.     CHW scheduled Patient for an CCC Phone Visit with AN CCC RN (Ines) for an RN CCC Assessment on 03/10/2022 at 12:30PM. Patient acknowledged.     CHW provided Patient with CHW contact information for additional support.     Chart Review    Referral: PCP      Reason:  o Complex Medical Concerns/Education; trying to find an effective treatment to her narcolepsy and sleep problems  o Mental Wellness (Health) (Mental Illness/Chemical Dependency); difficulty coping when tired and exhaused  o Patient/Caregiver Support; Resources for Support   o If Patient accepts CCC Support; Schedule Patient with AN CCC RN (Ines) for an  RN CCC Assessment    Upcoming Appointments;  o Lab Appointment 03/09/2022 10:00AM LAB [facility]    CHW Initial Information Gathering:  Referral Source: PCP  Current living arrangement:: I live alone  Type of residence:: Private home - stairs  Community Resources: None  No PCP office visit in Past Year: No  Transportation means:: Regular car  CHW Additional Questions  If ED/Hospital discharge, follow-up appointment scheduled as recommended?: N/A  Medication changes made following ED/Hospital discharge?: N/A  MyChart active?: Yes  Patient sent Social Determinants of Health questionnaire?: Yes    Patient accepts  CC: Yes. Patient scheduled for assessment with AN CCC SW on 03/10/2022 at 12:30PM. Patient noted desire to discuss finding effective treatment to her narcolepsy and sleep problems..     Melanie Bartlett NAILA  Clinic Care Coordination   [facility]   Phone: [phone]  [email or web address]

## 2022-03-07 NOTE — TELEPHONE ENCOUNTER
"Called patient to discuss her message.  The patient laughed and said, \"I'm ok!\" she states appreciated the call, but said when she filled this questionnaire out, she was very sleep deprived.   She has no plans of self harm, she feels safe. She does not want to die, and states \"not giving up that easy\"  Patient states she has a dog who can't live with anyone else, patient laughed.    Patient does state that she feels like she has to constantly work on the communication with the sleep center, days go by between messages and she feels that she has to keep calling or sending MyChart messages which she does not feel are as helpful as speaking to someone.    When she calls the [facility], the option to speak to a nurse, disconnects the call.     The patient is asking for a care coordinator to help her with this.  She needs guidance and an advocate in getting her more timely help.    Called the Sleep Center, was disconnected from the call when pressing option 2 to speak to a nurse.      Was able to speak to someone in scheduling, they will help send a High priority message to the provider.  The patient could benefit from a Virtual Visit to discuss her ongoing sleep concern.    Placing a RN Care Coordination referral.    Notified patient of the referral, and high priority message to the Sleep Center provider team.    Sasha Gutierrez RN, [facility]    "

## 2022-03-07 NOTE — TELEPHONE ENCOUNTER
Received bpa alert for elevated thoughts of better dead/self harm  Patient has appt 3/10/21  Please triage.     PHQ-9 score:    PHQ 3/5/2022   PHQ-9 Total Score 20   Q9: Thoughts of better off dead/self-harm past 2 weeks Several days   F/U: Thoughts of suicide or self-harm No   F/U: Safety concerns No

## 2022-03-09 ENCOUNTER — LAB (OUTPATIENT)
Dept: LAB | Facility: CLINIC | Age: 42
End: 2022-03-09
Payer: COMMERCIAL

## 2022-03-09 DIAGNOSIS — R53.83 FATIGUE, UNSPECIFIED TYPE: ICD-10-CM

## 2022-03-09 DIAGNOSIS — R68.2 DRY MOUTH: ICD-10-CM

## 2022-03-09 DIAGNOSIS — R76.8 ELEVATED ANTINUCLEAR ANTIBODY (ANA) LEVEL: ICD-10-CM

## 2022-03-09 DIAGNOSIS — H04.123 DRY EYES: ICD-10-CM

## 2022-03-09 LAB
BASOPHILS # BLD AUTO: 0.1 10E3/UL (ref 0–0.2)
BASOPHILS NFR BLD AUTO: 1 %
CREAT SERPL-MCNC: 0.67 MG/DL (ref 0.52–1.04)
EOSINOPHIL # BLD AUTO: 0.2 10E3/UL (ref 0–0.7)
EOSINOPHIL NFR BLD AUTO: 3 %
ERYTHROCYTE [DISTWIDTH] IN BLOOD BY AUTOMATED COUNT: 12.3 % (ref 10–15)
GFR SERPL CREATININE-BSD FRML MDRD: >90 ML/MIN/1.73M2
HCT VFR BLD AUTO: 41.2 % (ref 35–47)
HGB BLD-MCNC: 13.7 G/DL (ref 11.7–15.7)
LYMPHOCYTES # BLD AUTO: 1.1 10E3/UL (ref 0.8–5.3)
LYMPHOCYTES NFR BLD AUTO: 18 %
MCH RBC QN AUTO: 29.3 PG (ref 26.5–33)
MCHC RBC AUTO-ENTMCNC: 33.3 G/DL (ref 31.5–36.5)
MCV RBC AUTO: 88 FL (ref 78–100)
MONOCYTES # BLD AUTO: 0.5 10E3/UL (ref 0–1.3)
MONOCYTES NFR BLD AUTO: 8 %
NEUTROPHILS # BLD AUTO: 4.2 10E3/UL (ref 1.6–8.3)
NEUTROPHILS NFR BLD AUTO: 71 %
PLATELET # BLD AUTO: 267 10E3/UL (ref 150–450)
RBC # BLD AUTO: 4.67 10E6/UL (ref 3.8–5.2)
WBC # BLD AUTO: 5.9 10E3/UL (ref 4–11)

## 2022-03-09 PROCEDURE — 82565 ASSAY OF CREATININE: CPT

## 2022-03-09 PROCEDURE — 86146 BETA-2 GLYCOPROTEIN ANTIBODY: CPT | Mod: 59

## 2022-03-09 PROCEDURE — 85613 RUSSELL VIPER VENOM DILUTED: CPT

## 2022-03-09 PROCEDURE — 86147 CARDIOLIPIN ANTIBODY EA IG: CPT | Mod: 59

## 2022-03-09 PROCEDURE — 86235 NUCLEAR ANTIGEN ANTIBODY: CPT | Mod: 59

## 2022-03-09 PROCEDURE — 85025 COMPLETE CBC W/AUTO DIFF WBC: CPT

## 2022-03-09 PROCEDURE — 85730 THROMBOPLASTIN TIME PARTIAL: CPT

## 2022-03-09 PROCEDURE — 36415 COLL VENOUS BLD VENIPUNCTURE: CPT

## 2022-03-09 PROCEDURE — 85390 FIBRINOLYSINS SCREEN I&R: CPT | Performed by: PATHOLOGY

## 2022-03-10 ENCOUNTER — OFFICE VISIT (OUTPATIENT)
Dept: FAMILY MEDICINE | Facility: CLINIC | Age: 42
End: 2022-03-10
Payer: COMMERCIAL

## 2022-03-10 ENCOUNTER — PATIENT OUTREACH (OUTPATIENT)
Dept: NURSING | Facility: CLINIC | Age: 42
End: 2022-03-10
Payer: COMMERCIAL

## 2022-03-10 VITALS
DIASTOLIC BLOOD PRESSURE: 85 MMHG | HEART RATE: 93 BPM | OXYGEN SATURATION: 100 % | BODY MASS INDEX: 19.49 KG/M2 | WEIGHT: 110 LBS | TEMPERATURE: 98.8 F | HEIGHT: 63 IN | SYSTOLIC BLOOD PRESSURE: 139 MMHG | RESPIRATION RATE: 16 BRPM

## 2022-03-10 DIAGNOSIS — F41.9 ANXIETY: ICD-10-CM

## 2022-03-10 DIAGNOSIS — G47.19 EXCESSIVE DAYTIME SLEEPINESS: ICD-10-CM

## 2022-03-10 DIAGNOSIS — F32.1 MODERATE MAJOR DEPRESSION (H): ICD-10-CM

## 2022-03-10 DIAGNOSIS — Z12.4 CERVICAL CANCER SCREENING: ICD-10-CM

## 2022-03-10 DIAGNOSIS — R53.83 FATIGUE, UNSPECIFIED TYPE: ICD-10-CM

## 2022-03-10 DIAGNOSIS — M65.4 DE QUERVAIN'S DISEASE (TENOSYNOVITIS): Primary | ICD-10-CM

## 2022-03-10 DIAGNOSIS — G47.419 NARCOLEPSY WITHOUT CATAPLEXY(347.00): ICD-10-CM

## 2022-03-10 LAB
B2 GLYCOPROT1 IGG SERPL IA-ACNC: 1.1 U/ML
B2 GLYCOPROT1 IGM SERPL IA-ACNC: <2.4 U/ML
CARDIOLIPIN IGG SER IA-ACNC: <2 GPL-U/ML
CARDIOLIPIN IGG SER IA-ACNC: NEGATIVE
CARDIOLIPIN IGM SER IA-ACNC: 2.9 MPL-U/ML
CARDIOLIPIN IGM SER IA-ACNC: NEGATIVE
DRVVT SCREEN RATIO: 0.83
ENA SM IGG SER IA-ACNC: 2 U/ML
ENA SM IGG SER IA-ACNC: NEGATIVE
ENA SS-A AB SER IA-ACNC: <0.5 U/ML
ENA SS-A AB SER IA-ACNC: NEGATIVE
ENA SS-B IGG SER IA-ACNC: <0.6 U/ML
ENA SS-B IGG SER IA-ACNC: NEGATIVE
INR PPP: 1.07 (ref 0.85–1.15)
LA PPP-IMP: NEGATIVE
LUPUS INTERPRETATION: NORMAL
PTT RATIO: 1
THROMBIN TIME: 15.9 SECONDS (ref 13–19)
U1 SNRNP IGG SER IA-ACNC: <1.1 U/ML
U1 SNRNP IGG SER IA-ACNC: NEGATIVE

## 2022-03-10 PROCEDURE — 99215 OFFICE O/P EST HI 40 MIN: CPT | Performed by: FAMILY MEDICINE

## 2022-03-10 RX ORDER — HYDROXYZINE HYDROCHLORIDE 25 MG/1
25-50 TABLET, FILM COATED ORAL 3 TIMES DAILY PRN
Qty: 20 TABLET | Refills: 1 | Status: SHIPPED | OUTPATIENT
Start: 2022-03-10 | End: 2022-10-03

## 2022-03-10 ASSESSMENT — ACTIVITIES OF DAILY LIVING (ADL): DEPENDENT_IADLS:: INDEPENDENT

## 2022-03-10 ASSESSMENT — PATIENT HEALTH QUESTIONNAIRE - PHQ9
SUM OF ALL RESPONSES TO PHQ QUESTIONS 1-9: 20
10. IF YOU CHECKED OFF ANY PROBLEMS, HOW DIFFICULT HAVE THESE PROBLEMS MADE IT FOR YOU TO DO YOUR WORK, TAKE CARE OF THINGS AT HOME, OR GET ALONG WITH OTHER PEOPLE: EXTREMELY DIFFICULT

## 2022-03-10 ASSESSMENT — PAIN SCALES - GENERAL: PAINLEVEL: NO PAIN (0)

## 2022-03-10 NOTE — PROGRESS NOTES
Clinic Care Coordination Contact    Clinic Care Coordination Contact  OUTREACH    Referral Information:  Referral Source: PCP    Primary Diagnosis: Other (include Comment box) (narcolepsy)    Chief Complaint   Patient presents with     Clinic Care Coordination - Follow-up     CC RN     Universal Utilization: patients care with within [facility].   Clinic Utilization  Difficulty keeping appointments:: No  Compliance Concerns: No  No-Show Concerns: No  No PCP office visit in Past Year: No  Utilization    Hospital Admissions  0             ED Visits  0             No Show Count (past year)  1                Current as of: 3/10/2022  1:15 PM            Clinical Concerns:  Current Medical Concerns:    Patient Active Problem List   Diagnosis     Smoker     Other depression     Anxiety     Fatigue, unspecified type     Narcolepsy without cataplexy(347.00)       Current Behavioral Concerns: none   Education Provided to patient: CC RN called patient introduced self, role and CC services. Patient enrolled in CC with a health related goal.     Care team referral: trying to find an effective treatment to her narcolepsy and sleep problems. difficulty coping when tired and exhausted    We discussed how to have effective communication with her care teams. Patient is following with [facility]. Sleep study in December, and  follow up with Dr. Boothe 1/4/22 and diagnosed with narcolepsy.   -Patient was offered multiple medication treatment options from sleep medicine.   -Saw psychiatry to rule out depression. Last saw Dr. Escobar 2/24 and was to follow up only as need.  Had made mood medication changes along with adding new meds for her treatment of narcolepsy. Patient felt in hindsight that was not a good plan.   -After multiple back and fourth mychart messags, Dr. Boothe had suggested an appointment to address her concerns. Patient agreeable.   Patient is planning to follow up with Dr. Boothe  3/17 to discuss her sleepiness, and overall exhaustion.    CC RN called the sleep clinic at [phone] to ensure this was in fact an avenue to schedule an appointment or leave a message for her sleep medicine care team. Patient verbalized understanding     Pain  Pain (GOAL):: No  Health Maintenance Reviewed: Not assessed  Clinical Pathway:   Clinic Care Coordinator - Depression Initial Assessment  Most recent PHQ-9 score:   PHQ-9 SCORE 3/5/2022   PHQ-9 Total Score MyChart 20 (Severe depression)   PHQ-9 Total Score 20     Pt is taking medications as prescribed:  Yes  Pt has the following behavioral providers/supports involved:  mental health provider    Depression  Currently being treated or treated in the past for your depression?: Yes  Type of treatment:: Medication, Counseling  Overall your depression symptoms are (GOAL):: Improving    Medication Management:  Medication review status: Medications reviewed and no changes reported per patient.          Functional Status:  Dependent ADLs:: Independent  Dependent IADLs:: Independent  Bed or wheelchair confined:: No  Mobility Status: Independent  Fallen 2 or more times in the past year?: No  Any fall with injury in the past year?: No    Living Situation:  Current living arrangement:: I live alone  Type of residence:: [place] [facility] [place]    Lifestyle & Psychosocial Needs:    Social Determinants of Health     Tobacco Use: High Risk     Smoking Tobacco Use: Current Some Day Smoker     Smokeless Tobacco Use: Never Used   Alcohol Use: Not At Risk     Frequency of Alcohol Consumption: 2-4 times a month     Average Number of Drinks: 1 or 2     Frequency of Binge Drinking: Never   Financial Resource Strain: Low Risk      Difficulty of Paying Living Expenses: Not very hard   Food Insecurity: No Food Insecurity     Worried About Running Out of Food in the Last Year: Never true     Ran Out of Food in the Last Year: Never true   Transportation Needs: No Transportation Needs      Lack of Transportation (Medical): No     Lack of Transportation (Non-Medical): No   Physical Activity: Insufficiently Active     Days of Exercise per Week: 2 days     Minutes of Exercise per Session: 20 min   Stress: Stress Concern Present     Feeling of Stress : Very much   Social Connections: Socially Isolated     Frequency of Communication with Friends and Family: Once a week     Frequency of Social Gatherings with Friends and Family: Once a week     Attends Baptism Services: Never     Active Member of Clubs or Organizations: Yes     Attends Club or Organization Meetings: Not on file     Marital Status: Never    Intimate Partner Violence: Not on file   Depression: At risk     PHQ-2 Score: 5   Housing Stability: Low Risk      Unable to Pay for Housing in the Last Year: No     Number of Places Lived in the Last Year: 2     Unstable Housing in the Last Year: No     Diet:: Regular  Inadequate nutrition (GOAL):: No  Tube Feeding: No  Inadequate activity/exercise (GOAL):: No  Significant changes in sleep pattern (GOAL): Yes  Transportation means:: Regular car     Baptism or spiritual beliefs that impact treatment:: No  Mental health DX:: Yes  Mental health DX how managed:: Medication, Psychiatrist  Mental health management concern (GOAL):: No as patient has a current mental health provider and taking mood medication.     Resources and Interventions:  Current Resources:    Community Resources: None  Supplies Currently Used at Home: None  Equipment Currently Used at Home: none  Employment Status: employed full-time     Advance Care Plan/Directive  Advanced Care Plans/Directives on file:: No  Advanced Care Plan/Directive Status: Considering Options    Referrals Placed: None     Goals:   Goals        General     Medical (pt-stated)      Notes - Note edited  3/10/2022  1:49 PM by Ines Alcantara RN     Goal Statement: I want to have good communication with my care teams as I navigate my new health diagnosis  of narcolepsy.   Date Goal set: 3/10/22  Barriers: new health diagnosis of narcolepsy  Strengths: patient is a good self advocate   Date to Achieve By: 9/10/22  Patient expressed understanding of goal: yes  Action steps to achieve this goal:  1. I have various tools to communicate with my care teams  -Jama carreon  -[facility] scheduling line [phone]  -Appointments, both virtual and in person  2. I will plan to attend my 3/17 appointment with Dr. Boothe            Patient/Caregiver understanding: yes    Outreach Frequency: weekly  Future Appointments              Tomorrow [facility] [place]    Tomorrow [facility] [place]    Tomorrow [facility] [place]    In 1 week Gigi Boothe MD [facility]          Plan: 1. Patient will follow her current care plans.   2. Patient will follow up sooner should symptoms worsen, change or patient feels there is a need further care.  3. Care Coordination goals identified at this time. Patient enrolled in Care Coordination.   4. Complex care plan and care coordination introduction letter sent.   5. CC RN will reach out to patient 1-2 days after her 3/17 sleep med follow up appointment.     Ines Alcantara RN, BSN, PHN Care Coordinator  [facility]   Phone: [phone]

## 2022-03-10 NOTE — PROGRESS NOTES
Assessment & Plan     De Quervain's disease (tenosynovitis)  All this has been improved recently but still some intermittent symptoms.  We will give her a thumb spica wrist brace and discussed use of rest, ice as needed.  Could also consider some physical therapy or Ortho referral if persisting symptoms.  Hard to rule out that there is not a component of carpal tunnel also.  Given that these are treated very similarly encouraged her to try the conservative measures first but consider EMG and Ortho referral if persistent tingling  - Wrist/Arm/Hand Supplies Order for DME - ONLY FOR DME    Moderate major depression (H)  Anxiety  Symptoms are not well controlled currently she has recently been more agitated and irritable and the timing seem to correlate with going off the fluoxetine for a little bit.  We will have her resume fluoxetine at the 20 mg dose.  We will also give her some hydroxyzine to use as needed for panic and for some recent insomnia that she has been having.  She does verbally contract for safety and is not acutely suicidal.  - FLUoxetine (PROZAC) 20 MG capsule; Take 1 capsule (20 mg) by mouth daily  - hydrOXYzine (ATARAX) 25 MG tablet; Take 1-2 tablets (25-50 mg) by mouth 3 times daily as needed for other (sleep/anxiety)    Cervical cancer screening  Discussed that she is still due for a Pap smear and encouraged her to follow-up for this.    Narcolepsy without cataplexy(347.00)  This is a recent diagnosis for her.  She has had extensive struggles with fatigue and is quite frustrated with the slow process of work-up and treatment.  She has upcoming appointment with sleep clinic and will address her concerns with the provider.        41 minutes spent on the date of the encounter doing chart review, patient visit and documentation         Return in about 3 months (around 6/10/2022) for Routine preventive.    Liberty Alvarez MD  [facility]    Jett Lopez is a  41 year old who presents for the following health issues  accompanied by her self.    History of Present Illness       Back Pain:  She presents for follow up of back pain. Patient's back pain is a chronic problem.  Location of back pain:  Right lower back, left lower back, right upper back, left upper back, right side of neck, left side of neck, right shoulder and right hip  Description of back pain: burning, dull ache, sharp and other  Back pain spreads: right shoulder and right side of neck    Since patient first noticed back pain, pain is: always present, but gets better and worse  Does back pain interfere with her job:  Yes      Mental Health Follow-up:  Patient presents to follow-up on Depression & Anxiety.Patient's depression since last visit has been:  Worse  The patient is not having other symptoms associated with depression.  Patient's anxiety since last visit has been:  Worse  The patient is having other symptoms associated with anxiety.  Any significant life events: health concerns  Patient is feeling anxious or having panic attacks.  Patient has no concerns about alcohol or drug use.       Today's PHQ-9         PHQ-9 Total Score: 20  PHQ-9 Q9 Thoughts of better off dead/self-harm past 2 weeks :   (P) Several days  Thoughts of suicide or self harm: (P) No  Self-harm Plan:     Self-harm Action:       Safety concerns for self or others: (P) No    How difficult have these problems made it for you to do your work, take care of things at home, or get along with other people: Extremely difficult        Reason for visit:  Hand pain and disrupted sleep  Symptom onset:  More than a month  Symptoms include:  Recurring pain in right hand, thumb, and first digit. Nonrestorative sleep with insomnia  Symptom intensity:  Severe  Symptom progression:  Worsening  Had these symptoms before:  Yes  Has tried/received treatment for these symptoms:  Yes  Previous treatment was successful:  No  What makes it worse:  Even light  use of hand.  What makes it better:  Heat for hand pain. Currently no treatment has been effective for sleep/exhaustion, sans almost complete inactivity    She eats 2-3 servings of fruits and vegetables daily.She consumes 0 sweetened beverage(s) daily.She exercises with enough effort to increase her heart rate 9 or less minutes per day.  She exercises with enough effort to increase her heart rate 3 or less days per week.   She is taking medications regularly.     wrist seemed to improve after 10 days. Thinks it started end of January after push ups.   Pain was 1st two fingers, radiating from the wrist  Hurt to push off wrist  Flared again. Hurt with opening jar.   Used heat and babied and in general is doing somewhat better.     Getting xrays tomorrow for rheum w/u.  Possible underlying autoimmune disorder    appt with Dr Boothe next week. Dx with narcolepsy Jan 4th. Trial of modafanil. Triggered depression (Similar to wellbutrin). Discontinued med and felt better. Changed to ritalin 1/15/11. Not effective. Dose increased and no benefit.   Still waking frequently at night.  Is quite frustrated with the slow process of treating her symptoms and difficulty with communication with the sleep clinic.  Care coordination referral has been placed to help assist with this.    Last saw psych on 2/24/22- stopped fluoxetine 2/25. However restarted med last week as had melt down/more irritable/angry when off of the med.  Feels 20 mg dose is about the same as the 40 mg for mood mgmt.   Has felt more hopeless at times recently but denies plan to harm self.  Notes she would never commit suicide on her own but would consider physician assisted suicide if she continued to not have solutions for her issues.     Considering needing to take time off of work due to her extreme fatigue.     Declines covid booster.     Review of Systems   Constitutional, HEENT, cardiovascular, pulmonary, gi and gu systems are negative, except as  "otherwise noted.      Objective    /85   Pulse 93   Temp 98.8  F (37.1  C) (Tympanic)   Resp 16   Ht 1.59 m (5' 2.6\")   Wt 49.9 kg (110 lb)   LMP 03/10/2022   SpO2 100%   BMI 19.74 kg/m    Body mass index is 19.74 kg/m .  Physical Exam   GENERAL: healthy, alert and no distress  NECK: no adenopathy, no asymmetry, masses, or scars and thyroid normal to palpation  RESP: lungs clear to auscultation - no rales, rhonchi or wheezes  CV: regular rate and rhythm, normal S1 S2, no S3 or S4, no murmur, click or rub, no peripheral edema and peripheral pulses strong  MS: no gross musculoskeletal defects noted, no edema.  Wrist with normal range of motion.  + Finkelstein test on the right.  Tinel's is negative.  Phalen's induces tingling in all digits on both hands.   PSYCH: mentation appears normal, affect flat, speech slightly pressured and agitated reviewing her health history, judgement and insight intact and appearance well groomed    PHQ-9 SCORE 2/23/2022 2/23/2022 3/5/2022   PHQ-9 Total Score MyChart - 17 (Moderately severe depression) 20 (Severe depression)   PHQ-9 Total Score 17 17 20     BYRON-7 SCORE 10/7/2021 11/23/2021 2/22/2022   Total Score 8 (mild anxiety) 12 (moderate anxiety) 12 (moderate anxiety)   Total Score 8 12 12                 "

## 2022-03-10 NOTE — LETTER
Dear Jessica,    I am a clinic care coordinator who works with Liberty Alvarez MD at [facility]. I wanted to thank you for spending the time to talk with me.  Below is a description of clinic care coordination and how I can further assist you.      The clinic care coordination team is made up of a registered nurse,  and community health worker who understand the health care system. The goal of clinic care coordination is to help you manage your health and improve access to the health care system in the most efficient manner. The team can assist you in meeting your health care goals by providing education, coordinating services, strengthening the communication among your providers and supporting you with any resource needs.    Please feel free to contact me at [phone] with any questions or concerns. We are focused on providing you with the highest-quality healthcare experience possible and that all starts with you.     Sincerely,     Ines Alcantara RN, BSN, PHN Care Coordinator  [facility], and [facility]   Phone: [phone]

## 2022-03-10 NOTE — LETTER
[facility]  Patient Centered Plan of Care  About Me:        Patient Name:  Jessica Mello    YOB: 1980  Age:         41 year old   [place] MRN:    [id number] Telephone Information:  Home Phone [phone]   Mobile [phone]       Address:  [address] [place] [address] [address]  [place] [address] Email address:  [email or web address]      Emergency Contact(s)    Name Relationship Lgl Grd Work Phone Home Phone Mobile Phone   Tyler LYNN Sister   [phone]            Primary language:  English     needed? No   [facility] [employer]:  [phone] op. 1  Other communication barriers:None    Preferred Method of Communication:     Current living arrangement: I live alone    Mobility Status/ Medical Equipment: Independent        Health Maintenance  Health Maintenance Reviewed: Not assessed      My Access Plan  Medical Emergency 911   Primary Clinic Line   -     24 Hour Appointment Line [phone] or  [phone]) (toll-free)   24 Hour Nurse Line [phone] (toll-free)   Preferred Urgent Care No data recorded   Preferred Hospital No data recorded   Preferred Pharmacy [id number] - [place], [place] - [address] AT [facility] [address]     Behavioral Health Crisis Line The National Suicide Prevention Lifeline at 1-117.941.9659 or 911             My Care Team Members  Patient Care Team       Relationship Specialty Notifications Start End    Liberty Alvarez MD PCP - General Family Medicine  12/21/21     Phone: [phone] Fax: [phone] [address] [facility] [address]    Amairani Abbasi MD Assigned Musculoskeletal Provider   10/23/20     Phone: [phone] Fax: [phone]         [facility] [address] [place] [address]    Liberty Alvarez MD Assigned PCP   5/6/21     Phone: [phone] Fax: [phone] [address] [facility] [address]    Gigi Boothe MD  Assigned Sleep Provider   1/9/22     Phone: [phone] Fax: [phone]         [address] [place] [address]    Mariza Escobar MD Assigned Behavioral Health Provider   1/23/22     Phone: [phone] Fax: [phone]         [address] [facility] [address] [place] [address]    Jordan Louis MD Assigned Rheumatology Provider   2/27/22     Phone: [phone] Fax: [phone] [address] [facility] [address]    Ines Alcantara, RN Lead Care Coordinator Primary Care - CC Admissions 3/10/22     Phone: [phone] Fax: [phone]                My Care Plans  Self Management and Treatment Plan  Goals and (Comments)  Goals        General     Medical (pt-stated)      Notes - Note edited  3/10/2022  1:49 PM by Ines Alcantara, RN     Goal Statement: I want to have good communication with my care teams as I navigate my new health diagnosis of narcolepsy.   Date Goal set: 3/10/22  Barriers: new health diagnosis of narcolepsy  Strengths: patient is a good self advocate   Date to Achieve By: 9/10/22  Patient expressed understanding of goal: yes  Action steps to achieve this goal:  1. I have various tools to communicate with my care teams  -[facility] scheduling line [phone]  -Appointments, both virtual and in person  2. I will plan to attend my 3/17 appointment with Dr. Boothe               Action Plans on File:                       Advance Care Plans/Directives Type:   No data recorded    My Medical and Care Information  Problem List   Patient Active Problem List   Diagnosis     Smoker     Other depression     Anxiety     Fatigue, unspecified type     Narcolepsy without cataplexy(347.00)      Current Medications and Allergies:  See ramos.    Care Coordination Start Date: 3/10/2022   Frequency of Care Coordination: weekly     Form Last Updated: 03/10/2022

## 2022-03-10 NOTE — PATIENT INSTRUCTIONS
We would consider EMG if persistent tingling.   Wear wrist braces    Patient Education     Understanding De Quervain Tenosynovitis   De Quervain tenosynovitis is a condition that can cause wrist and thumb pain. Tendons connect muscles in your wrist and forearm to the bones in your thumb. The tendons have a protective cover (sheath). The sheath s lining makes a fluid that lets the tendons slide easily when you straighten your wrist and thumb. If any of these tendons are irritated or injured, they can become swollen and inflamed. This is called de Quervain tenosynovitis.      How to say it  [id number]-oh-VY-[id number]   What causes de Quervain tenosynovitis?   This condition is most often caused from overuse. For example, making the same wrist motions over and over can irritate the tendons. This includes doing things like unscrewing jar lids or grasping a tool. Activities such as typing, playing racquet sports, knitting, and texting can also lead to the condition.   Symptoms of de Quervain tenosynovitis   You may have pain, soreness, redness, and swelling along the side of your wrist and the base of your thumb. You may feel pain when you pinch or grasp things, turn or touch your wrist, or make a fist. Your thumb may catch or make a crackling sound when you move it.   Treatment for de Quervain tenosynovitis   Treatments may include:    Resting the wrist and thumb. This involves limiting movements that make your symptoms worse. You also may need to avoid certain hobbies, sports, and types of work for a time.    Cold packs. These help reduce pain and swelling.    Prescription or over-the-counter medicines. These help relieve pain and swelling. NSAIDs (nonsteroidal anti-inflammatory drugs) are the most common medicines used. Medicines may be prescribed or bought over the counter. They may be given as pills. Or they may be put on the skin as a gel, cream, or patch.    Splint or brace.  This helps keep the thumb and  wrist from moving and gives time for your tendons to heal.    Exercises or physical therapy. These help stretch, strengthen, and improve the range of motion in your wrist and thumb.    Shots of medicine into the area around the tendon.  These usually are anti-inflammatory medicines called corticosteroids . They may help ease pain and swelling .    Surgery. You may need surgery if other treatments don t relieve symptoms. During surgery, the surgeon releases the sheath that surrounds the tendons so the tendons can move more easily.  Possible complications of de Quervain tenosynovitis   Without proper care and treatment, healing may take longer than normal. Also, symptoms may continue or get worse. Over time, the problem may become long-term (chronic). This can make it hard to use your wrist and thumb for normal activities.   When to call your healthcare provider   Call your healthcare provider right away if you have any of these:     Fever of 100.4 F (38 C) or higher, or as directed by your provider    Chills    Symptoms that don t get better with treatment, or get worse    Pain, numbness, or coldness in the hand    New symptoms  Cy last reviewed this educational content on 12/1/2019 2000-2021 [employer]. All rights reserved. This information is not intended as a substitute for professional medical care. Always follow your healthcare professional's instructions.

## 2022-03-11 ENCOUNTER — ANCILLARY PROCEDURE (OUTPATIENT)
Dept: GENERAL RADIOLOGY | Facility: CLINIC | Age: 42
End: 2022-03-11
Attending: INTERNAL MEDICINE
Payer: COMMERCIAL

## 2022-03-11 DIAGNOSIS — R53.83 FATIGUE, UNSPECIFIED TYPE: ICD-10-CM

## 2022-03-11 DIAGNOSIS — G89.29 CHRONIC MIDLINE LOW BACK PAIN WITHOUT SCIATICA: ICD-10-CM

## 2022-03-11 DIAGNOSIS — M54.50 CHRONIC MIDLINE LOW BACK PAIN WITHOUT SCIATICA: ICD-10-CM

## 2022-03-11 DIAGNOSIS — G89.29 CHRONIC RIGHT SHOULDER PAIN: ICD-10-CM

## 2022-03-11 DIAGNOSIS — M25.511 CHRONIC RIGHT SHOULDER PAIN: ICD-10-CM

## 2022-03-11 PROCEDURE — 73030 X-RAY EXAM OF SHOULDER: CPT | Mod: RT | Performed by: RADIOLOGY

## 2022-03-11 PROCEDURE — 72200 X-RAY EXAM SI JOINTS: CPT | Performed by: RADIOLOGY

## 2022-03-11 PROCEDURE — 72100 X-RAY EXAM L-S SPINE 2/3 VWS: CPT | Performed by: RADIOLOGY

## 2022-03-16 ASSESSMENT — SLEEP AND FATIGUE QUESTIONNAIRES
HOW LIKELY ARE YOU TO NOD OFF OR FALL ASLEEP WHILE LYING DOWN TO REST IN THE AFTERNOON WHEN CIRCUMSTANCES PERMIT: HIGH CHANCE OF DOZING
HOW LIKELY ARE YOU TO NOD OFF OR FALL ASLEEP WHEN YOU ARE A PASSENGER IN A CAR FOR AN HOUR WITHOUT A BREAK: HIGH CHANCE OF DOZING
HOW LIKELY ARE YOU TO NOD OFF OR FALL ASLEEP WHILE WATCHING TV: HIGH CHANCE OF DOZING
HOW LIKELY ARE YOU TO NOD OFF OR FALL ASLEEP WHILE SITTING AND READING: HIGH CHANCE OF DOZING
HOW LIKELY ARE YOU TO NOD OFF OR FALL ASLEEP IN A CAR, WHILE STOPPED FOR A FEW MINUTES IN TRAFFIC: SLIGHT CHANCE OF DOZING
HOW LIKELY ARE YOU TO NOD OFF OR FALL ASLEEP WHILE SITTING INACTIVE IN A PUBLIC PLACE: MODERATE CHANCE OF DOZING
HOW LIKELY ARE YOU TO NOD OFF OR FALL ASLEEP WHILE SITTING AND TALKING TO SOMEONE: SLIGHT CHANCE OF DOZING
HOW LIKELY ARE YOU TO NOD OFF OR FALL ASLEEP WHILE SITTING QUIETLY AFTER LUNCH WITHOUT ALCOHOL: HIGH CHANCE OF DOZING

## 2022-03-17 ENCOUNTER — VIRTUAL VISIT (OUTPATIENT)
Dept: SLEEP MEDICINE | Facility: CLINIC | Age: 42
End: 2022-03-17
Payer: COMMERCIAL

## 2022-03-17 VITALS — WEIGHT: 105 LBS | BODY MASS INDEX: 18.61 KG/M2 | HEIGHT: 63 IN

## 2022-03-17 DIAGNOSIS — F33.41 RECURRENT MAJOR DEPRESSIVE DISORDER, IN PARTIAL REMISSION (H): ICD-10-CM

## 2022-03-17 DIAGNOSIS — G47.419 NARCOLEPSY WITHOUT CATAPLEXY(347.00): Primary | ICD-10-CM

## 2022-03-17 PROCEDURE — 99214 OFFICE O/P EST MOD 30 MIN: CPT | Mod: GT | Performed by: FAMILY MEDICINE

## 2022-03-17 NOTE — PROGRESS NOTES
"Jessica is a 41 year old who is being evaluated via a billable video visit.      How would you like to obtain your AVS? MyChart  If the video visit is dropped, the invitation should be resent by: Text to cell phone: [phone]  Will anyone else be joining your video visit? No  {If patient encounters technical issues they should call [phone] :[id number]}Susan Hemphill      Video Start Time: {video visit start/end time for provider to select:152948}  Video-Visit Details    Type of service:  Video Visit    Video End Time:{video visit start/end time for provider to select:152948}    Originating Location (pt. Location): {video visit patient location:833680::\"Home\"}    Distant Location (provider location):  [facility]     Platform used for Video Visit: {Virtual Visit Platforms:897713::\"[employer]\"}  "

## 2022-03-18 ENCOUNTER — PATIENT OUTREACH (OUTPATIENT)
Dept: NURSING | Facility: CLINIC | Age: 42
End: 2022-03-18
Payer: COMMERCIAL

## 2022-03-18 ENCOUNTER — CARE COORDINATION (OUTPATIENT)
Dept: SLEEP MEDICINE | Facility: CLINIC | Age: 42
End: 2022-03-18

## 2022-03-18 PROBLEM — F33.9 MAJOR DEPRESSION, RECURRENT (H): Status: ACTIVE | Noted: 2022-03-18

## 2022-03-18 NOTE — PROGRESS NOTES
Clinic Care Coordination Contact  [facility]    Clinical Data: patient and CC RN had previously agreed on follow up today.   Outreach attempted x 1.     Patient answered phone for check in, however, stated she is busy and is asking for follow up next week.     Plan:  Care Coordinator will try to reach patient again in 1 business days.    Ines Alcantara RN, BSN, PHN Care Coordinator  [place], [place], and [facility]   Phone: [phone]

## 2022-03-18 NOTE — PROGRESS NOTES
Received faxed confirmation of initial RX for Xyrem from [facility] pharmacy.  Sent back message as patient last name was spelled [place].  Corrected fax to read [place].  Scanned form into epic.

## 2022-03-21 NOTE — PROGRESS NOTES
Clinic Care Coordination Contact  [facility]/Vo[facility]     Clinical Data: Care Coordinator Outreach    Outreach attempted x 2.  Left message on patient's voicemail with call back information and requested return call.  Plan: Care Coordinator will try to reach patient again in 2-3 weeks.    Ines Alcantara RN, BSN, PHN Care Coordinator  [place], [place], and [facility]   Phone: [phone]

## 2022-04-01 DIAGNOSIS — G89.29 CHRONIC MIDLINE LOW BACK PAIN WITHOUT SCIATICA: ICD-10-CM

## 2022-04-01 DIAGNOSIS — M54.50 CHRONIC MIDLINE LOW BACK PAIN WITHOUT SCIATICA: ICD-10-CM

## 2022-04-01 DIAGNOSIS — M25.511 CHRONIC RIGHT SHOULDER PAIN: Primary | ICD-10-CM

## 2022-04-01 DIAGNOSIS — G89.29 CHRONIC RIGHT SHOULDER PAIN: Primary | ICD-10-CM

## 2022-04-11 ENCOUNTER — PATIENT OUTREACH (OUTPATIENT)
Dept: NURSING | Facility: CLINIC | Age: 42
End: 2022-04-11
Payer: COMMERCIAL

## 2022-04-11 RX ORDER — SODIUM OXYBATE 0.5 G/ML
SOLUTION ORAL
COMMUNITY
End: 2022-09-27

## 2022-04-11 ASSESSMENT — ACTIVITIES OF DAILY LIVING (ADL): DEPENDENT_IADLS:: INDEPENDENT

## 2022-04-11 NOTE — PROGRESS NOTES
Clinic Care Coordination Contact    Follow Up Progress Note      Assessment: patient and CC RN connected to support patient through her current goal of care. Patient did attend her virtual appointment with sleep medicine as planned. her methylphenidate (RITALIN) 10 MG tablet was discontinued. Patient started  Xyrem 3/29/22 she is currently going up in [facility]. She is hopeful to see more benefits, more uninterrupted sleep. Had one night of 4 hours of solid sleep. Currently taking 2 doses at night. No side effects. Using a special pharmacy for Xyrem prescription. CC RN added up this med to her med list. Patient also not using hydroxizine, CC RN flagged this for removal.     Following up with sleep medicine in a few weeks via [employer].     Care Gaps: were not discussed at this outreach.     Health Maintenance Due   Topic Date Due     DEPRESSION ACTION PLAN  Never done     Pneumococcal Vaccine: Pediatrics (0 to 5 Years) and At-Risk Patients (6 to 64 Years) (1 of 2 - PPSV23) Never done     HIV SCREENING  Never done     HEPATITIS C SCREENING  Never done     PAP  Never done     COVID-19 Vaccine (3 - Booster for Moderna series) 01/24/2022     Goals addressed this encounter:    Goals Addressed                    This Visit's Progress       General       Medical (pt-stated)   On track      Goal Statement: I want to have good communication with my care teams as I navigate my new health diagnosis of narcolepsy.   Date Goal set: 3/10/22  Barriers: new health diagnosis of narcolepsy  Strengths: patient is a good self advocate   Date to Achieve By: 9/10/22  Patient expressed understanding of goal: yes  Action steps to achieve this goal:  1. I have various tools to communicate with my care teams  -[facility] scheduling line [phone]  -Appointments, both virtual and in person  2. I will plan to attend my 3/17 appointment  with Dr. Boothe                Intervention/Education provided during outreach: Discussed patients plan of care. CC RN asked open ended questions, provided support, resources, and encouragement as needed. Patient will reach out to care team sooner than planned with new questions or concerns.    Plan:   Care Coordinator will follow up in 1 month.  Ines Alcantara RN, BSN, PHN Care Coordinator  [place], [place], and [facility]   Phone: [phone]

## 2022-04-22 ENCOUNTER — ANCILLARY PROCEDURE (OUTPATIENT)
Dept: MRI IMAGING | Facility: CLINIC | Age: 42
End: 2022-04-22
Attending: INTERNAL MEDICINE
Payer: COMMERCIAL

## 2022-04-22 DIAGNOSIS — M25.511 CHRONIC RIGHT SHOULDER PAIN: ICD-10-CM

## 2022-04-22 DIAGNOSIS — G89.29 CHRONIC MIDLINE LOW BACK PAIN WITHOUT SCIATICA: ICD-10-CM

## 2022-04-22 DIAGNOSIS — G89.29 CHRONIC RIGHT SHOULDER PAIN: ICD-10-CM

## 2022-04-22 DIAGNOSIS — M54.50 CHRONIC MIDLINE LOW BACK PAIN WITHOUT SCIATICA: ICD-10-CM

## 2022-04-22 PROCEDURE — 72195 MRI PELVIS W/O DYE: CPT | Performed by: RADIOLOGY

## 2022-04-22 PROCEDURE — 73221 MRI JOINT UPR EXTREM W/O DYE: CPT | Mod: RT | Performed by: RADIOLOGY

## 2022-04-24 NOTE — RESULT ENCOUNTER NOTE
RN: Please call to notify Jessica Mello that the MRI of the SI joints was normal.  MRI of the right shoulder shoulder showed tendinosis and degenerative changes that may benefit from continued physical therapy and consideration of steroid injection.  It would be helpful to evaluate in clinic. Please assist her with scheduling an in-office appointment.   Jordan Louis MD  4/24/2022

## 2022-04-29 NOTE — RESULT ENCOUNTER NOTE
RN: Please see if Jessica Mello is able to come to clinic at 11:00 AM on Friday, May 6, 2022.    Thank you,  Jordan Louis MD  4/29/2022

## 2022-05-06 ENCOUNTER — OFFICE VISIT (OUTPATIENT)
Dept: RHEUMATOLOGY | Facility: CLINIC | Age: 42
End: 2022-05-06
Payer: COMMERCIAL

## 2022-05-06 VITALS — HEART RATE: 87 BPM | OXYGEN SATURATION: 100 % | DIASTOLIC BLOOD PRESSURE: 84 MMHG | SYSTOLIC BLOOD PRESSURE: 136 MMHG

## 2022-05-06 DIAGNOSIS — M54.50 CHRONIC MIDLINE LOW BACK PAIN WITHOUT SCIATICA: ICD-10-CM

## 2022-05-06 DIAGNOSIS — G89.29 CHRONIC MIDLINE LOW BACK PAIN WITHOUT SCIATICA: ICD-10-CM

## 2022-05-06 DIAGNOSIS — M25.511 RIGHT SHOULDER PAIN, UNSPECIFIED CHRONICITY: Primary | ICD-10-CM

## 2022-05-06 PROCEDURE — 99213 OFFICE O/P EST LOW 20 MIN: CPT | Performed by: INTERNAL MEDICINE

## 2022-05-06 NOTE — NURSING NOTE
Blood pressure rechecked after visit    136/84  Jacy Dahl CMA Rheumatology  5/6/2022                                 RAPID3 (0-30) Cumulative Score  20.7          RAPID3 Weighted Score (divide #4 by 3 and that is the weighted score)  6.9
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
## 2022-05-06 NOTE — PATIENT INSTRUCTIONS
RHEUMATOLOGY    Dr. Jordan Louis    [address] [place] [address]  Phone number: [phone]  Fax number: [phone]      Thank you for choosing [facility]!    Jacy Dahl CMA Rheumatology

## 2022-05-10 ENCOUNTER — PATIENT OUTREACH (OUTPATIENT)
Dept: NURSING | Facility: CLINIC | Age: 42
End: 2022-05-10
Payer: COMMERCIAL

## 2022-05-10 ASSESSMENT — ACTIVITIES OF DAILY LIVING (ADL): DEPENDENT_IADLS:: INDEPENDENT

## 2022-05-10 NOTE — PROGRESS NOTES
"Clinic Care Coordination Contact    Follow Up Progress Note      Assessment: CC RN and patient connected via telephone for goal progression.   -patient reports feeling \"better in general\" getting more \"consistant sleep\" and \"I feel more human.\"   -patient is getting on average 2.5 hours of sleep after dose 1 and dose 2 of Xyrem at night. Patient continues to increase dose with specialty pharmacy.   -patient had follow up with Rheumatology for a positive KEIRY marker 5/6/22. , Rheum did a work up and results were negative. No further Rheum follow up advised.   -patient is going to call her insurance to inquire on physical therapy insurance coverage. Referral made by rheum for right shoulder pain and low back pain.   -patient feels her depression and anxiety symptoms are controlled with prozac 20 mg daily. Not using hydroxyzine PRN, flagged for removal.     Care Gaps: discussed with patient today. Goal set.    Health Maintenance Due   Topic Date Due     DEPRESSION ACTION PLAN  Never done     Pneumococcal Vaccine: Pediatrics (0 to 5 Years) and At-Risk Patients (6 to 64 Years) (1 - PCV) Never done     HIV SCREENING  Never done     HEPATITIS C SCREENING  Never done     PAP  Never done     COVID-19 Vaccine (3 - Booster for Moderna series) 01/24/2022     PREVENTIVE CARE VISIT  06/03/2022       Goals addressed this encounter:    Goals Addressed                    This Visit's Progress       General       Medical (pt-stated)   On track      Goal Statement: I want to have good communication with my care teams as I navigate my new health diagnosis of narcolepsy.   Date Goal set: 3/10/22  Barriers: new health diagnosis of narcolepsy  Strengths: patient is a good self advocate   Date to Achieve By: 9/10/22  Patient expressed understanding of goal: yes  Action steps to achieve this goal:  1. I have various tools to communicate with my care teams  -[facility]  -[facility] scheduling line " [phone]  -Appointments, both virtual and in person               Other (pt-stated)         Goal Statement: I will complete my annual wellness visit.  Date Goal set: 5/10/22  Barriers: long wait to see PCP for preventative care visit.   Strengths: patient desires to complete this exam  Date to Achieve By: 10/2022  Patient expressed understanding of goal: yes  Action steps to achieve this goal:  1. I will attend my annual wellness visit 10/3/22.  2. I will contact my Care Management or clinic team if I have barriers to attending my annual wellness visit.               Intervention/Education provided during outreach: Discussed patients plan of care. CC RN asked open ended questions, provided support, resources, and encouragement as needed. Patient will reach out to care team sooner than planned with new questions or concerns.       Outreach Frequency: monthly    Plan:   Care Coordinator will follow up in 1 month.  Ines Alcantara RN, BSN, PHN Care Coordinator  [place], [place], and [place]   Phone: [phone]

## 2022-05-16 ENCOUNTER — THERAPY VISIT (OUTPATIENT)
Dept: PHYSICAL THERAPY | Facility: CLINIC | Age: 42
End: 2022-05-16
Attending: INTERNAL MEDICINE
Payer: COMMERCIAL

## 2022-05-16 ENCOUNTER — MYC MEDICAL ADVICE (OUTPATIENT)
Dept: SLEEP MEDICINE | Facility: CLINIC | Age: 42
End: 2022-05-16

## 2022-05-16 DIAGNOSIS — G89.29 CHRONIC RIGHT SHOULDER PAIN: ICD-10-CM

## 2022-05-16 DIAGNOSIS — M54.50 CHRONIC MIDLINE LOW BACK PAIN WITHOUT SCIATICA: ICD-10-CM

## 2022-05-16 DIAGNOSIS — M25.511 CHRONIC RIGHT SHOULDER PAIN: ICD-10-CM

## 2022-05-16 DIAGNOSIS — G89.29 CHRONIC MIDLINE LOW BACK PAIN WITHOUT SCIATICA: ICD-10-CM

## 2022-05-16 DIAGNOSIS — M25.511 RIGHT SHOULDER PAIN, UNSPECIFIED CHRONICITY: ICD-10-CM

## 2022-05-16 PROCEDURE — 97161 PT EVAL LOW COMPLEX 20 MIN: CPT | Mod: GP | Performed by: PHYSICAL THERAPIST

## 2022-05-16 PROCEDURE — 97110 THERAPEUTIC EXERCISES: CPT | Mod: GP | Performed by: PHYSICAL THERAPIST

## 2022-05-16 PROCEDURE — 97112 NEUROMUSCULAR REEDUCATION: CPT | Mod: GP | Performed by: PHYSICAL THERAPIST
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
## 2022-05-23 ENCOUNTER — THERAPY VISIT (OUTPATIENT)
Dept: PHYSICAL THERAPY | Facility: CLINIC | Age: 42
End: 2022-05-23
Payer: COMMERCIAL

## 2022-05-23 DIAGNOSIS — M25.511 CHRONIC RIGHT SHOULDER PAIN: Primary | ICD-10-CM

## 2022-05-23 DIAGNOSIS — M54.50 CHRONIC MIDLINE LOW BACK PAIN WITHOUT SCIATICA: ICD-10-CM

## 2022-05-23 DIAGNOSIS — G89.29 CHRONIC RIGHT SHOULDER PAIN: Primary | ICD-10-CM

## 2022-05-23 DIAGNOSIS — G89.29 CHRONIC MIDLINE LOW BACK PAIN WITHOUT SCIATICA: ICD-10-CM

## 2022-05-23 PROCEDURE — 97110 THERAPEUTIC EXERCISES: CPT | Mod: GP | Performed by: PHYSICAL THERAPIST

## 2022-05-23 PROCEDURE — 97112 NEUROMUSCULAR REEDUCATION: CPT | Mod: GP | Performed by: PHYSICAL THERAPIST

## 2022-06-02 ENCOUNTER — THERAPY VISIT (OUTPATIENT)
Dept: PHYSICAL THERAPY | Facility: CLINIC | Age: 42
End: 2022-06-02
Payer: COMMERCIAL

## 2022-06-02 DIAGNOSIS — M54.50 CHRONIC MIDLINE LOW BACK PAIN WITHOUT SCIATICA: ICD-10-CM

## 2022-06-02 DIAGNOSIS — G89.29 CHRONIC RIGHT SHOULDER PAIN: Primary | ICD-10-CM

## 2022-06-02 DIAGNOSIS — G89.29 CHRONIC MIDLINE LOW BACK PAIN WITHOUT SCIATICA: ICD-10-CM

## 2022-06-02 DIAGNOSIS — M25.511 CHRONIC RIGHT SHOULDER PAIN: Primary | ICD-10-CM

## 2022-06-02 PROCEDURE — 97110 THERAPEUTIC EXERCISES: CPT | Mod: GP | Performed by: PHYSICAL THERAPIST

## 2022-06-02 PROCEDURE — 97112 NEUROMUSCULAR REEDUCATION: CPT | Mod: GP | Performed by: PHYSICAL THERAPIST

## 2022-06-02 NOTE — PROGRESS NOTES
Physical Therapy Initial Evaluation  Subjective:                        Objective:  System    Physical Exam    General     ROS    Assessment/Plan:    {REHAB NOTES:747157}

## 2022-06-02 NOTE — LETTER
[facility]  [address]  [address]  [place] [address]  [phone]    Anastasia 3, 2022    Re: Jessica Mello   :   1980  MRN:  [id number]   REFERRING PHYSICIAN:   Jordan TOLEDO [facility]    Date of Initial Evaluation:  2022  Visits:  Rxs Used: 3  Reason for Referral:     Chronic right shoulder pain  Chronic midline low back pain without sciatica     PROGRESS  REPORT    Progress reporting period is from 22 to 22, 3 visits.       SUBJECTIVE  Subjective changes noted by patient:  Have not been able to do HEP as much, only 1x/day or not every day.  Neck exercise does help at work to lessen R upper trapezius/scap pain.  Has not done EIL due to wrist pain but EIS does seem to help some.  Overall though still having pain low back and R neck region.     Current Pain level: 3/10 (shoulder 3/10, LB 1/10).      Initial Pain level: 9/10 (9/10 at worst shoulder, LB 5-6/10 at worst).   Changes in function:  Yes (See Goal flowsheet attached for changes in current functional level)  Adverse reaction to treatment or activity: None    OBJECTIVE  Changes noted in objective findings:  Yes:  CROM flexion mod loss, ext 10% loss, rotation min loss B, SB no loss.    LROM flexion min loss, ext min loss, SG no loss B no pain.    MMT elbow ext B 5/5, elbow flex B 5/5, shoulder IR B 5/5, ER L 4+/5, R 4/5, supraspinatus full can L 5/5, R 4+/5,empty can L 5/5, R 4/5 with pain.    Supine ab TA strength 2/5.    R upper trapezius and scap pain respond well to cervical retraction/extension exercise, patient just needs to perform more consistently.     ASSESSMENT/PLAN  Updated problem list and treatment plan: Diagnosis 1:  Chronic R shoulder pain (cervical involvement)    Pain -  manual therapy, self management, education, directional preference exercise and home program  Decreased ROM/flexibility - manual  therapy, therapeutic exercise, therapeutic activity and home program  Decreased strength - therapeutic exercise, therapeutic activities and home program  Jessica Mello   :   1980    Decreased function - therapeutic activities and home program  Impaired posture - neuro re-education, therapeutic activities and home program  Diagnosis 2:  chronic low back pain without sciatica     Pain -  manual therapy, self management, education, directional preference exercise and home program  Decreased ROM/flexibility - manual therapy, therapeutic exercise, therapeutic activity and home program  Decreased strength - therapeutic exercise, therapeutic activities and home program  Decreased function - therapeutic activities and home program  Impaired posture - neuro re-education, therapeutic activities and home program  STG/LTGs have been met or progress has been made towards goals:  Yes (See Goal flow sheet completed today.)  Assessment of Progress: The patient's condition is improving.  The patient's condition has potential to improve.  Self Management Plans:  Patient has been instructed in a home treatment program.  Patient  has been instructed in self management of symptoms.  I have re-evaluated this patient and find that the nature, scope, duration and intensity of the therapy is appropriate for the medical condition of the patient.  Jessica continues to require the following intervention to meet STG and LTG's:  PT    Recommendations:  This patient would benefit from continued therapy.     Frequency:  1 X week, once daily  Duration:  for 5 weeks      Please refer to the daily flowsheet for treatment today, total treatment time and time spent performing 1:1 timed codes.    Thank you for your referral.    INQUIRIES  Therapist: LIOR ThibodeauxT,CERT MDT,[address] [address]  Phone: [phone]  Fax:  [phone]

## 2022-06-03 NOTE — PROGRESS NOTES
Subjective:  HPI  Physical Exam                    Objective:  System    Physical Exam    General     ROS    Assessment/Plan:    PROGRESS  REPORT    Progress reporting period is from 5/16/22 to 6/2/22, 3 visits.       SUBJECTIVE  Subjective changes noted by patient:  Have not been able to do HEP as much, only 1x/day or not every day.  Neck exercise does help at work to lessen R upper trapezius/scap pain.  Has not done EIL due to wrist pain but EIS does seem to help some.  Overall though still having pain low back and R neck region.     Current Pain level: 3/10 (shoulder 3/10, LB 1/10).      Initial Pain level: 9/10 (9/10 at worst shoulder, LB 5-6/10 at worst).   Changes in function:  Yes (See Goal flowsheet attached for changes in current functional level)  Adverse reaction to treatment or activity: None    OBJECTIVE  Changes noted in objective findings:  Yes:  CROM flexion mod loss, ext 10% loss, rotation min loss B, SB no loss.    LROM flexion min loss, ext min loss, SG no loss B no pain.    MMT elbow ext B 5/5, elbow flex B 5/5, shoulder IR B 5/5, ER L 4+/5, R 4/5, supraspinatus full can L 5/5, R 4+/5,empty can L 5/5, R 4/5 with pain.    Supine ab TA strength 2/5.    R upper trapezius and scap pain respond well to cervical retraction/extension exercise, patient just needs to perform more consistently.     ASSESSMENT/PLAN  Updated problem list and treatment plan: Diagnosis 1:  Chronic R shoulder pain (cervical involvement)    Pain -  manual therapy, self management, education, directional preference exercise and home program  Decreased ROM/flexibility - manual therapy, therapeutic exercise, therapeutic activity and home program  Decreased strength - therapeutic exercise, therapeutic activities and home program  Decreased function - therapeutic activities and home program  Impaired posture - neuro re-education, therapeutic activities and home program  Diagnosis 2:  chronic low back pain without sciatica     Pain -   manual therapy, self management, education, directional preference exercise and home program  Decreased ROM/flexibility - manual therapy, therapeutic exercise, therapeutic activity and home program  Decreased strength - therapeutic exercise, therapeutic activities and home program  Decreased function - therapeutic activities and home program  Impaired posture - neuro re-education, therapeutic activities and home program  STG/LTGs have been met or progress has been made towards goals:  Yes (See Goal flow sheet completed today.)  Assessment of Progress: The patient's condition is improving.  The patient's condition has potential to improve.  Self Management Plans:  Patient has been instructed in a home treatment program.  Patient  has been instructed in self management of symptoms.  I have re-evaluated this patient and find that the nature, scope, duration and intensity of the therapy is appropriate for the medical condition of the patient.  Jessica continues to require the following intervention to meet STG and LTG's:  PT    Recommendations:  This patient would benefit from continued therapy.     Frequency:  1 X week, once daily  Duration:  for 5 weeks        Please refer to the daily flowsheet for treatment today, total treatment time and time spent performing 1:1 timed codes.

## 2022-06-05 ASSESSMENT — ANXIETY QUESTIONNAIRES
8. IF YOU CHECKED OFF ANY PROBLEMS, HOW DIFFICULT HAVE THESE MADE IT FOR YOU TO DO YOUR WORK, TAKE CARE OF THINGS AT HOME, OR GET ALONG WITH OTHER PEOPLE?: SOMEWHAT DIFFICULT
7. FEELING AFRAID AS IF SOMETHING AWFUL MIGHT HAPPEN: SEVERAL DAYS
4. TROUBLE RELAXING: NEARLY EVERY DAY
2. NOT BEING ABLE TO STOP OR CONTROL WORRYING: NEARLY EVERY DAY
3. WORRYING TOO MUCH ABOUT DIFFERENT THINGS: NEARLY EVERY DAY
1. FEELING NERVOUS, ANXIOUS, OR ON EDGE: MORE THAN HALF THE DAYS
GAD7 TOTAL SCORE: 14
GAD7 TOTAL SCORE: 14
6. BECOMING EASILY ANNOYED OR IRRITABLE: SEVERAL DAYS
5. BEING SO RESTLESS THAT IT IS HARD TO SIT STILL: SEVERAL DAYS
GAD7 TOTAL SCORE: 14
7. FEELING AFRAID AS IF SOMETHING AWFUL MIGHT HAPPEN: SEVERAL DAYS

## 2022-06-05 ASSESSMENT — PATIENT HEALTH QUESTIONNAIRE - PHQ9
10. IF YOU CHECKED OFF ANY PROBLEMS, HOW DIFFICULT HAVE THESE PROBLEMS MADE IT FOR YOU TO DO YOUR WORK, TAKE CARE OF THINGS AT HOME, OR GET ALONG WITH OTHER PEOPLE: EXTREMELY DIFFICULT
SUM OF ALL RESPONSES TO PHQ QUESTIONS 1-9: 14
SUM OF ALL RESPONSES TO PHQ QUESTIONS 1-9: 14

## 2022-06-06 ENCOUNTER — VIRTUAL VISIT (OUTPATIENT)
Dept: FAMILY MEDICINE | Facility: CLINIC | Age: 42
End: 2022-06-06
Payer: COMMERCIAL

## 2022-06-06 DIAGNOSIS — M65.4 DE QUERVAIN'S DISEASE (TENOSYNOVITIS): ICD-10-CM

## 2022-06-06 DIAGNOSIS — G47.419 NARCOLEPSY WITHOUT CATAPLEXY(347.00): ICD-10-CM

## 2022-06-06 DIAGNOSIS — M25.519 SHOULDER PAIN, UNSPECIFIED CHRONICITY, UNSPECIFIED LATERALITY: ICD-10-CM

## 2022-06-06 DIAGNOSIS — M54.50 LOW BACK PAIN, UNSPECIFIED BACK PAIN LATERALITY, UNSPECIFIED CHRONICITY, UNSPECIFIED WHETHER SCIATICA PRESENT: ICD-10-CM

## 2022-06-06 DIAGNOSIS — M25.539 PAIN IN WRIST, UNSPECIFIED LATERALITY: ICD-10-CM

## 2022-06-06 DIAGNOSIS — F41.9 ANXIETY: ICD-10-CM

## 2022-06-06 DIAGNOSIS — F32.1 MODERATE MAJOR DEPRESSION (H): ICD-10-CM

## 2022-06-06 DIAGNOSIS — R53.83 FATIGUE, UNSPECIFIED TYPE: Primary | ICD-10-CM

## 2022-06-06 PROCEDURE — 99214 OFFICE O/P EST MOD 30 MIN: CPT | Mod: GT | Performed by: FAMILY MEDICINE

## 2022-06-06 RX ORDER — FLUOXETINE 40 MG/1
40 CAPSULE ORAL DAILY
Qty: 40 CAPSULE | Refills: 1 | Status: SHIPPED | OUTPATIENT
Start: 2022-06-06 | End: 2022-07-11

## 2022-06-06 ASSESSMENT — ANXIETY QUESTIONNAIRES: GAD7 TOTAL SCORE: 14

## 2022-06-06 ASSESSMENT — PATIENT HEALTH QUESTIONNAIRE - PHQ9
SUM OF ALL RESPONSES TO PHQ QUESTIONS 1-9: 14
10. IF YOU CHECKED OFF ANY PROBLEMS, HOW DIFFICULT HAVE THESE PROBLEMS MADE IT FOR YOU TO DO YOUR WORK, TAKE CARE OF THINGS AT HOME, OR GET ALONG WITH OTHER PEOPLE: EXTREMELY DIFFICULT

## 2022-06-06 NOTE — PROGRESS NOTES
Jessica is a 41 year old who is being evaluated via a billable video visit.      How would you like to obtain your AVS? MyChart  If the video visit is dropped, the invitation should be resent by: Text to cell phone: [phone]   Will anyone else be joining your video visit? 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 Finding difficult to focus and complete work with current work load and is hoping to drop down to 50% for the next 6 weeks to address and allow time to get her therapies completed, meds to kick in and get the appropriate sleep f/u  She will bring in FMLA forms and drop off. I will fill out next week when I am back in the office.     Moderate major depression (H)  Anxiety  Flared due to above. intererested in trialing higher fluoxetine dosing. Reviewed onset of action of meds, common side effects and plan for close f/u. Encouraged call to clinic if symptoms worsening or adverse reactions. F/u 5-6 weeks planned  - FLUoxetine (PROZAC) 40 MG capsule; Take 1 capsule (40 mg) by mouth daily    De Quervain's disease (tenosynovitis)  Shoulder pain, unspecified chronicity, unspecified laterality  Low back pain, unspecified back pain laterality, unspecified chronicity, unspecified whether sciatica present  Pain in wrist, unspecified laterality  Given ongoing pain will refer to ortho for further f/u. Continue therapy  - Orthopedic  Referral; Future         Tobacco Cessation:   reports that she has been smoking cigarettes. She started smoking about 24 years ago. She has a 2.00 pack-year smoking history. She has never used smokeless tobacco.  Tobacco Cessation Action Plan: this was not addressed with todays visit.         Return in about 5 weeks (around 7/11/2022) for Follow up, med check, using a video visit.    Liberty Alvarez MD  [facility]  DAVON Lopez is a 41 year old who presents for the following health issues     History of Present Illness       Mental Health Follow-up:  Patient presents to follow-up on Depression & Anxiety.Patient's depression since last visit has been:  Medium  The patient is not having other symptoms associated with depression.  Patient's anxiety since last visit has been:  Worse  The patient is having other symptoms associated with anxiety.  Any significant life events: job concerns and health concerns  Patient is feeling anxious or having panic attacks.  Patient has no concerns about alcohol or drug use.    Reason for visit:  FMLA, follow up narcolepsy and pain management    She eats 4 or more servings of fruits and vegetables daily.She consumes 0 sweetened beverage(s) daily.She exercises with enough effort to increase her heart rate 9 or less minutes per day.  She exercises with enough effort to increase her heart rate 3 or less days per week.   She is taking medications regularly.    Today's PHQ-9         PHQ-9 Total Score: 14    PHQ-9 Q9 Thoughts of better off dead/self-harm past 2 weeks :   Not at all    How difficult have these problems made it for you to do your work, take care of things at home, or get along with other people: Extremely difficult  Today's BYRON-7 Score: 14     Rheum: no current rheum d/o to explain sx's. rec PHYSICAL THERAPY for her back and chronic right shoulder pain. On week 4 of PHYSICAL THERAPY for her shoulder and back and some improvement there. Working on posture so might be contributing to fatigue also. Ongoing wrist pain. Splint is helping but even with minimal use still painful 3-4/10 around thumb and hand. Finding hard to find the time/energy to do the home PHYSICAL THERAPY but really wants. To.     Narcolepsy: modafinil methylphenidate did not improve sx's, AE. Last visit sleep clinic 3/2022.   Xyrem started one mo ago. Helped a lot in beginning and then that feeling tapered  "off a bit. \"different kind of tired now\". Getting solid 5-6 hours of sleep per night.     Interested in FMLA. Find it's difficult to make through work-work due to fatigue. Feel only few hours of productivity prior to crashing.   Has been calling out of work on fridays.  Would like to start by dropping her work schedule to 50%. Work 2 hours with break than another 2 hours. Max 4 hours per day. divided by breaks. For the next 6 weeks.     Mood: \"really stressed lately\". More anxious. Wake early with \"mind spinning\". Falling behind on things, hard to get things done. Melancholy.       PHQ-9 SCORE 2/23/2022 3/5/2022 6/5/2022   PHQ-9 Total Score MyChart 17 (Moderately severe depression) 20 (Severe depression) 14 (Moderate depression)   PHQ-9 Total Score 17 20 14     BYRON-7 SCORE 11/23/2021 2/22/2022 6/5/2022   Total Score 12 (moderate anxiety) 12 (moderate anxiety) 14 (moderate anxiety)   Total Score 12 12 14       PHQ 6/5/2022   PHQ-9 Total Score 14   Q9: Thoughts of better off dead/self-harm past 2 weeks Not at all   F/U: Thoughts of suicide or self-harm -   F/U: Safety concerns -               Review of Systems         Objective           Vitals:  No vitals were obtained today due to virtual visit.    Physical Exam   GENERAL: Healthy, alert and no distress  EYES: Eyes grossly normal to inspection.  No discharge or erythema, or obvious scleral/conjunctival abnormalities.  RESP: No audible wheeze, cough, or visible cyanosis.  No visible retractions or increased work of breathing.    SKIN: Visible skin clear. No significant rash, abnormal pigmentation or lesions.  NEURO: Cranial nerves grossly intact.  Mentation and speech appropriate for age.  PSYCH: Mentation appears normal, affect normal/bright, judgement and insight intact, normal speech and appearance well-groomed.              Video-Visit Details    Type of service:  Video Visit    Video End Time:11:24 AM    Originating Location (pt. Location): Home    Distant " Location (provider location):  [facility]     Platform used for Video Visit: Feliberto

## 2022-06-07 ENCOUNTER — TELEPHONE (OUTPATIENT)
Dept: SLEEP MEDICINE | Facility: CLINIC | Age: 42
End: 2022-06-07

## 2022-06-07 NOTE — TELEPHONE ENCOUNTER
[employer] requesting call back in regards to patient insurance denial of Xyrem. They would like to know if Dr. Boothe will appeal or how this will be handled.       Routed to [place] team.

## 2022-06-08 ENCOUNTER — TELEPHONE (OUTPATIENT)
Dept: FAMILY MEDICINE | Facility: CLINIC | Age: 42
End: 2022-06-08
Payer: COMMERCIAL

## 2022-06-08 ENCOUNTER — PATIENT OUTREACH (OUTPATIENT)
Dept: CARE COORDINATION | Facility: CLINIC | Age: 42
End: 2022-06-08
Payer: COMMERCIAL

## 2022-06-08 NOTE — TELEPHONE ENCOUNTER
What type of form?   FMLA      What day did you drop off your forms?     6/8/22    Is there a due date? (we ask for 7-10 business days to complete all forms)    ASAP    What would you like us to do with form once completed?   Please fax forms to [employer] attn: Ondina Salazar  [phone]      Which clinic was the form dropped off at?     [facility]    What is the best number to contact you?     [phone]    Is it okay to leave a detailed message?    Yes    Form placed in TC bin.

## 2022-06-08 NOTE — PROGRESS NOTES
Clinic Care Coordination Contact  [facility]/Voicemail    Clinical Data: Care Coordinator Outreach  Outreach attempted x 1.  Left message on patient's voicemail with call back information and requested return call.  Plan: Care Coordinator will try to reach patient again in 10 business days.    Vale Eason, Casual RN Care Coordinator  [facility]  (Covering for Lead RN Care Coordinator)

## 2022-06-14 NOTE — TELEPHONE ENCOUNTER
Forms faxed to clinic via [employer].     Please add clinic stamp to page 2 of forms  Please fax and update patient once completed.   Please make sure copy of forms gets scanned to chart.

## 2022-06-16 ENCOUNTER — MYC MEDICAL ADVICE (OUTPATIENT)
Dept: FAMILY MEDICINE | Facility: CLINIC | Age: 42
End: 2022-06-16
Payer: COMMERCIAL

## 2022-06-16 NOTE — TELEPHONE ENCOUNTER
Uday Hedrick,     Can we try to fax these FMLA forms again?     Thank you!  Priscila Caldwell RN, BSN  [facility]

## 2022-06-16 NOTE — TELEPHONE ENCOUNTER
Clinic stamp added and form faxed to [phone] and placed in abstraction bin for scanning into patients chart

## 2022-06-21 ENCOUNTER — TELEPHONE (OUTPATIENT)
Dept: FAMILY MEDICINE | Facility: CLINIC | Age: 42
End: 2022-06-21

## 2022-06-21 DIAGNOSIS — F32.1 MODERATE MAJOR DEPRESSION (H): Primary | ICD-10-CM

## 2022-06-22 ENCOUNTER — PATIENT OUTREACH (OUTPATIENT)
Dept: CARE COORDINATION | Facility: CLINIC | Age: 42
End: 2022-06-22

## 2022-06-22 NOTE — TELEPHONE ENCOUNTER
Forms completed   Please add clinic stamp and my tax ID number on last page.   Fax and then scan copy to chart.

## 2022-06-22 NOTE — LETTER
Dear Jessica,    I have been unsuccessful in reaching you since our last contact. At this time the Care Coordination team will make no further attempts to reach you, however this does not change your ability to continue receiving care from your providers at your primary care clinic. If you need additional support from a care coordinator in the future please contact Fatemeh rubio Community Heatlh Worker (CHW) at [phone]     All of us at [facility] are invested in your health and are here to assist you in meeting your goals.     Sincerely,    Ines Alcantara RN, BSN, PHN Care Coordinator  [facility]

## 2022-06-22 NOTE — PROGRESS NOTES
Clinic Care Coordination Contact  [facility]/Voicemail    Clinical Data: Care Coordinator Outreach    Outreach attempted x 2.  Left message on patient's voicemail with call back information and requested return call.    Plan: Care Coordinator will send disenrollment letter with care coordinator contact information via mail. Care Coordinator will do no further outreaches at this time.    Ines Alcantara RN, BSN, PHN Care Coordinator  [place], [place]   Phone: [phone]

## 2022-06-22 NOTE — LETTER
[facility]  Patient Centered Plan of Care  About Me:        Patient Name:  Jessica Mello    YOB: 1980  Age:         41 year old   Arcelia MRN:    [id number] Telephone Information:  Home Phone [phone]   Mobile [phone]       Address:  [address] [place] [address] [address]  [place] [address] Email address:  [email or web address]      Emergency Contact(s)    Name Relationship Lgl Grd Work Phone Home Phone Mobile Phone   Tyler LYNN Sister   [phone]            Primary language:  English     needed? No   [place] [facility]:  [phone] op. 1  Other communication barriers:None    Preferred Method of Communication:     Current living arrangement: I live alone    Mobility Status/ Medical Equipment: Independent        Health Maintenance  Health Maintenance Reviewed: Not assessed      My Access Plan  Medical Emergency 911   Primary Clinic Line   -     24 Hour Appointment Line [phone] or  [phone]) (toll-free)   24 Hour Nurse Line [phone] (toll-free)   Preferred Urgent Care No data recorded   Preferred Hospital No data recorded   [id number] [place], [place] - [address] AT [facility] [address]     Behavioral Health Crisis Line The National Suicide Prevention Lifeline at 1-951.338.4214 or 911             My Care Team Members  Patient Care Team       Relationship Specialty Notifications Start End    Liberty Alvarez MD PCP - General Family Medicine  12/21/21     Phone: [phone] Fax: [phone] [address] [facility] [address]    Liberty Alvarez MD Assigned PCP   5/6/21     Phone: [phone] Fax: [phone] [address] [facility] [address]    Gigi Boothe MD Assigned Sleep Provider   1/9/22     Phone: [phone] Fax: [phone] [address] [facility] [address]    Mariza Escobar MD Assigned Behavioral Health  Provider   1/23/22     Phone: [phone] Fax: [phone]         [address] [place] [address]    Jordan Louis MD Assigned Rheumatology Provider   2/27/22     Phone: [phone] Fax: [phone] [address] [facility] [place] [address]    Ines Alcantara, RN Lead Care Coordinator Primary Care - CC Admissions 3/10/22     Phone: [phone] Fax: [phone]                My Care Plans  Self Management and Treatment Plan  Goals and (Comments)   Goals        General     Medical (pt-stated)      Notes - Note edited  5/10/2022  2:47 PM by Ines Alcantara, RN     Goal Statement: I want to have good communication with my care teams as I navigate my new health diagnosis of narcolepsy.   Date Goal set: 3/10/22  Barriers: new health diagnosis of narcolepsy  Strengths: patient is a good self advocate   Date to Achieve By: 9/10/22  Patient expressed understanding of goal: yes  Action steps to achieve this goal:  1. I have various tools to communicate with my care teams  -[facility] [employer] [facility] scheduling line [phone]  -Appointments, both virtual and in person             Other (pt-stated)      Notes - Note edited  5/10/2022  2:59 PM by Ines Alcantara, RN     Goal Statement: I will complete my annual wellness visit.  Date Goal set: 5/10/22  Barriers: long wait to see PCP for preventative care visit.   Strengths: patient desires to complete this exam  Date to Achieve By: 10/2022  Patient expressed understanding of goal: yes  Action steps to achieve this goal:  1. I will attend my annual wellness visit 10/3/22.  2. I will contact my Care Management or clinic team if I have barriers to attending my annual wellness visit.                  Action Plans on File:                       Advance Care Plans/Directives Type:   No data recorded    My Medical and Care Information  Problem List   Patient Active Problem List   Diagnosis     Smoker     Other depression     Anxiety      Fatigue, unspecified type     Narcolepsy without cataplexy(347.00)     Major depression, recurrent (H)     Chronic right shoulder pain     Chronic midline low back pain without sciatica      Current Medications and Allergies:  See lucianot        Frequency of Care Coordination: monthly     Form Last Updated: 06/22/2022

## 2022-06-24 NOTE — TELEPHONE ENCOUNTER
DIAGNOSIS: De Quervain's disease/ Pain of left forearm & Paresthesias    APPOINTMENT DATE: 07/06/2022   NOTES STATUS DETAILS   OFFICE NOTE from referring provider Internal 06/06/2022 Dr Alvarez [facility]    OFFICE NOTE from other specialist Internal 06/02/2022 physical therapy Erin Iraheta [facility]  05/06/2022 Dr Louis [facility]      DISCHARGE SUMMARY from hospital N/A    DISCHARGE REPORT from the ER N/A    OPERATIVE REPORT N/A    EMG report N/A    MEDICATION LIST N/A    MRI Internal 04/22/2022 RT shoulder, pelvic bones   DEXA (osteoporosis/bone health) N/A    CT SCAN N/A    XRAYS (IMAGES & REPORTS) Internal 03/11/2022 RT shoulder, sacroiliac joint lumbar spine  10/12/2020 cervical spine

## 2022-06-27 ENCOUNTER — THERAPY VISIT (OUTPATIENT)
Dept: PHYSICAL THERAPY | Facility: CLINIC | Age: 42
End: 2022-06-27
Payer: COMMERCIAL

## 2022-06-27 DIAGNOSIS — M54.50 CHRONIC MIDLINE LOW BACK PAIN WITHOUT SCIATICA: ICD-10-CM

## 2022-06-27 DIAGNOSIS — G89.29 CHRONIC RIGHT SHOULDER PAIN: Primary | ICD-10-CM

## 2022-06-27 DIAGNOSIS — M25.511 CHRONIC RIGHT SHOULDER PAIN: Primary | ICD-10-CM

## 2022-06-27 DIAGNOSIS — G89.29 CHRONIC MIDLINE LOW BACK PAIN WITHOUT SCIATICA: ICD-10-CM

## 2022-06-27 PROCEDURE — 97110 THERAPEUTIC EXERCISES: CPT | Mod: GP | Performed by: PHYSICAL THERAPIST

## 2022-06-27 PROCEDURE — 97112 NEUROMUSCULAR REEDUCATION: CPT | Mod: GP | Performed by: PHYSICAL THERAPIST

## 2022-07-06 ENCOUNTER — OFFICE VISIT (OUTPATIENT)
Dept: ORTHOPEDICS | Facility: CLINIC | Age: 42
End: 2022-07-06
Payer: COMMERCIAL

## 2022-07-06 ENCOUNTER — PRE VISIT (OUTPATIENT)
Dept: ORTHOPEDICS | Facility: CLINIC | Age: 42
End: 2022-07-06

## 2022-07-06 DIAGNOSIS — M65.4 DE QUERVAIN'S DISEASE (TENOSYNOVITIS): ICD-10-CM

## 2022-07-06 DIAGNOSIS — M25.539 PAIN IN WRIST, UNSPECIFIED LATERALITY: ICD-10-CM

## 2022-07-06 PROCEDURE — 99213 OFFICE O/P EST LOW 20 MIN: CPT | Performed by: FAMILY MEDICINE

## 2022-07-06 NOTE — LETTER
7/6/2022         RE: Jessica Mello  [address]  [place] [address]        Dear Colleague,    Thank you for referring your patient, Jessica Mello, to the [facility] [place]. Please see a copy of my visit note below.      [facility]  SPORTS MEDICINE CLINIC VISIT     Jul 6, 2022        ASSESSMENT & PLAN    dequervains of right wrist    Reviewed imaging and assessment with patient in detail  We discussed the use of bracing, topical anti-inflammatory, oral OTC medications, home exercises and hand therapy, and steroid injection.  At the current time the patient would like to try continued use of bracing with home exercises.  She will contact us if she would like a referral to hand therapy.    Farshad Duke MD  [facility] SPORTS MEDICINE [facility]    -----  Chief Complaint   Patient presents with     Consult     Right thumb pain       SUBJECTIVE  Jessica Mello is a/an 41 year old female who is seen in consultation at the request of  Liberty Hylton M.D. for evaluation of right thumb pain.     The patient is seen by themselves.  The patient is Right handed    Onset: 8 month(s) ago. Patient describes injury as after doing pushup and opening a jar.   Location of Pain: right thumb  Worsened by: using hand/thumb  Better with: splinting helped  Treatments tried: rest/activity avoidance, ice, heat and casting/splinting/bracing - thumb spica  Associated symptoms: no distal numbness or tingling; denies swelling or warmth and numbness    Orthopedic/Surgical history: NO  Social History/Occupation: desk work      REVIEW OF SYSTEMS:    Do you have fever, chills, weight loss? No    Do you have any vision problems? No    Do you have any chest pain or edema? No    Do you have any shortness of breath or wheezing?  No    Do you have stomach problems? No    Do you have any numbness or focal weakness? No    Do you have diabetes? No    Do  you have problems with bleeding or clotting? No    Do you have an rashes or other skin lesions? No    OBJECTIVE:  There were no vitals taken for this visit.     General  - alert, pleasant, no distress  CV  - normal radial pulse, cap refill brisk  Musculoskeletal -right wrist  - inspection: normal joint alignment, no obvious deformity, no swelling  - palpation: TTP over the radial styloid otherwise no bony or soft tissue tenderness, no tenderness at the anatomical snuffbox  - ROM:  90 deg flexion   70 deg extension   25 deg abduction   65 deg adduction  - strength: 5/5  strength, 5/5 flexion, extension, pronation, supination, adduction, abduction  - special tests:  (-) Tinel's  (+) Finkelstein  (-) Phalen  (-) Connolly click test  (-) ulnar impaction  Neuro  - no sensory or motor deficit, grossly normal coordination, normal muscle tone  Skin  - no ecchymosis, erythema, warmth, or induration, no obvious rash          RADIOLOGY:    No imaging this visit          Again, thank you for allowing me to participate in the care of your patient.        Sincerely,        Farshad Duke MD

## 2022-07-06 NOTE — PROGRESS NOTES
[facility]  SPORTS MEDICINE CLINIC VISIT     Jul 6, 2022        ASSESSMENT & PLAN    dequervains of right wrist    Reviewed imaging and assessment with patient in detail  We discussed the use of bracing, topical anti-inflammatory, oral OTC medications, home exercises and hand therapy, and steroid injection.  At the current time the patient would like to try continued use of bracing with home exercises.  She will contact us if she would like a referral to hand therapy.    Farshad Duke MD  [facility] SPORTS MEDICINE CLINIC [place]    -----  Chief Complaint   Patient presents with     Consult     Right thumb pain       SUBJECTIVE  Jessica Mello is a/an 41 year old female who is seen in consultation at the request of  Liberty Hylton M.D. for evaluation of right thumb pain.     The patient is seen by themselves.  The patient is Right handed    Onset: 8 month(s) ago. Patient describes injury as after doing pushup and opening a jar.   Location of Pain: right thumb  Worsened by: using hand/thumb  Better with: splinting helped  Treatments tried: rest/activity avoidance, ice, heat and casting/splinting/bracing - thumb spica  Associated symptoms: no distal numbness or tingling; denies swelling or warmth and numbness    Orthopedic/Surgical history: NO  Social History/Occupation: desk work      REVIEW OF SYSTEMS:    Do you have fever, chills, weight loss? No    Do you have any vision problems? No    Do you have any chest pain or edema? No    Do you have any shortness of breath or wheezing?  No    Do you have stomach problems? No    Do you have any numbness or focal weakness? No    Do you have diabetes? No    Do you have problems with bleeding or clotting? No    Do you have an rashes or other skin lesions? No    OBJECTIVE:  There were no vitals taken for this visit.     General  - alert, pleasant, no distress  CV  - normal radial pulse, cap refill brisk  Musculoskeletal -right wrist  -  inspection: normal joint alignment, no obvious deformity, no swelling  - palpation: TTP over the radial styloid otherwise no bony or soft tissue tenderness, no tenderness at the anatomical snuffbox  - ROM:  90 deg flexion   70 deg extension   25 deg abduction   65 deg adduction  - strength: 5/5  strength, 5/5 flexion, extension, pronation, supination, adduction, abduction  - special tests:  (-) Tinel's  (+) Finkelstein  (-) Phalen  (-) Connolly click test  (-) ulnar impaction  Neuro  - no sensory or motor deficit, grossly normal coordination, normal muscle tone  Skin  - no ecchymosis, erythema, warmth, or induration, no obvious rash          RADIOLOGY:    No imaging this visit

## 2022-07-06 NOTE — PATIENT INSTRUCTIONS
DE QUERVAIN'S TENOSYNOVITIS    WHAT IS DE QUERVAIN'S TENOSYNOVITIS?    De Quervain's tenosynovitis is a problem with the tendons on the thumb side of your wrist. Tendons are strong bands of tissue that attach muscle to bone. A sheath, or covering, surrounds the tendons that run from your wrist to your thumb. The tendons usually move easily through this sheath. Tenosynovitis is an irritation and thickening of this sheath that traps the tendons or makes it harder for the tendons to move through the sheath.    WHAT IS THE CAUSE?    De Quervain's tenosynovitis is usually caused by overusing your thumb or wrist. This is more likely in activities where you bend your wrist or use your thumb to  something, like skiing, typing, or construction work. Other causes of this condition include wrist injuries and rheumatoid arthritis.    WHAT ARE THE SYMPTOMS?    Symptoms may include:    Pain when you move your thumb or wrist, or make a fist  Swelling and pain on the thumb side of your wrist  Feeling or hearing creaking as you move your thumb or wrist    HOW IS IT DIAGNOSED?    Your healthcare provider will examine you and ask about your symptoms, activities, and medical history. You may have X-rays or other scans.    HOW IS IT TREATED?    You will need to stop doing the activities that cause pain until you have healed. Your healthcare provider may give you a splint that covers and protects your wrist and thumb. The splint can help relieve your symptoms. If you keep having pain, your provider may give you a shot of a steroid medicine. If these treatments don't work, you may need surgery to relieve the pain.    Your healthcare provider may recommend exercises to help you heal.    HOW CAN I TAKE CARE OF MYSELF?    To help relieve swelling and pain:    Put an ice pack, gel pack, or package of frozen vegetables wrapped in a cloth, on the area every 3 to 4 hours for up to 20 minutes at a time.  Do ice massage. To do this, first  freeze water in a Styrofoam cup, then peel the top of the cup away to expose the ice. Hold the bottom of the cup and rub the ice over your tendon for 5 to 10 minutes. Do this several times a day while you have pain.  Keep your wrist up on pillows when you sit or lie down.  Take pain medicine, such as acetaminophen, ibuprofen, or other medicine as directed by your provider. Nonsteroidal anti-inflammatory medicines (NSAIDs), such as ibuprofen, may cause stomach bleeding and other problems. These risks increase with age. Read the label and take as directed. Unless recommended by your healthcare provider, do not take for more than 10 days.  Moist heat may help relax your muscles and make it easier to move your hand and wrist. Put moist heat on the injured area for 10 to 15 minutes at a time before exercises. Moist heat includes heat patches or moist heating pads that you can purchase at most drugstores, a wet washcloth or towel that has been heated in the dryer, or a hot shower. Don t use heat if you have swelling.    Follow your healthcare provider's instructions, including any exercises recommended by your provider. Ask your provider:    How and when you will hear your test results  How long it will take to recover  What activities you should avoid, including how much you can lift, and when you can return to your normal activities  How to take care of yourself at home  What symptoms or problems you should watch for and what to do if you have them  Make sure you know when you should come back for a checkup.    HOW CAN I HELP PREVENT DE QUERVAIN'S TENOSYNOVITIS?    Warm-up exercises and stretching before activities can help prevent this problem. If your wrist hurts after exercise, putting ice on it may help keep it from getting injured.    Follow safety rules and use any protective equipment recommended for your work or sport.    Avoid activities that overuse your thumb or wrist.    Developed by [employer].  Published  by [employer].  Copyright  2014 [employer] and/or one of its subsidiaries. All rights reserved.    EXERCISES:    You may do these exercises when it is not painful to move your hand.    Opposition stretch: Rest your hand on a table, palm up. Touch the tip of your thumb to the tip of your little finger. Hold this position for 6 seconds and then release. Repeat 10 times.    Wrist stretch: Press the back of the hand on your injured side with your other hand to help bend your wrist. Hold for 15 to 30 seconds. Next, stretch the hand back by pressing the fingers in a backward direction. Hold for 15 to 30 seconds. Keep the arm on your injured side straight during this exercise. Do 3 sets.    Wrist flexion: Hold a can or hammer handle in your hand with your palm facing up. Bend your wrist upward. Slowly lower the weight and return to the starting position. Do 2 sets of 15. Gradually increase the weight of the can or weight you are holding.  Wrist radial deviation strengthening: Put your wrist in the sideways position with your thumb up. Hold a can of soup or a hammer handle and gently bend your wrist up, with the thumb reaching toward the ceiling. Slowly lower to the starting position. Do not move your forearm throughout this exercise. Do 2 sets of 15.    Wrist extension: Hold a soup can or hammer handle in your hand with your palm facing down. Slowly bend your wrist up. Slowly lower the weight down into the starting position. Do 2 sets of 15. Gradually increase the weight of the object you are holding.   strengthening: Squeeze a soft rubber ball and hold the squeeze for 5 seconds. Do 2 sets of 15.  Finger spring: Place a large rubber band around the outside of your thumb and fingers. Open your fingers to stretch the rubber band. Do 2 sets of 15.        Developed by [employer].  Published by [employer].  Copyright  2014 [employer] and/or one of its subsidiaries. All rights reserved.

## 2022-07-07 ENCOUNTER — THERAPY VISIT (OUTPATIENT)
Dept: PHYSICAL THERAPY | Facility: CLINIC | Age: 42
End: 2022-07-07
Payer: COMMERCIAL

## 2022-07-07 DIAGNOSIS — G89.29 CHRONIC RIGHT SHOULDER PAIN: Primary | ICD-10-CM

## 2022-07-07 DIAGNOSIS — M54.50 CHRONIC MIDLINE LOW BACK PAIN WITHOUT SCIATICA: ICD-10-CM

## 2022-07-07 DIAGNOSIS — G89.29 CHRONIC MIDLINE LOW BACK PAIN WITHOUT SCIATICA: ICD-10-CM

## 2022-07-07 DIAGNOSIS — M25.511 CHRONIC RIGHT SHOULDER PAIN: Primary | ICD-10-CM

## 2022-07-07 PROCEDURE — 97110 THERAPEUTIC EXERCISES: CPT | Mod: GP | Performed by: PHYSICAL THERAPIST

## 2022-07-07 PROCEDURE — 97112 NEUROMUSCULAR REEDUCATION: CPT | Mod: GP | Performed by: PHYSICAL THERAPIST

## 2022-07-11 ENCOUNTER — MYC MEDICAL ADVICE (OUTPATIENT)
Dept: FAMILY MEDICINE | Facility: CLINIC | Age: 42
End: 2022-07-11

## 2022-07-11 ENCOUNTER — VIRTUAL VISIT (OUTPATIENT)
Dept: FAMILY MEDICINE | Facility: CLINIC | Age: 42
End: 2022-07-11
Payer: COMMERCIAL

## 2022-07-11 DIAGNOSIS — F41.9 ANXIETY: ICD-10-CM

## 2022-07-11 DIAGNOSIS — R53.83 FATIGUE, UNSPECIFIED TYPE: ICD-10-CM

## 2022-07-11 DIAGNOSIS — G47.419 NARCOLEPSY WITHOUT CATAPLEXY(347.00): ICD-10-CM

## 2022-07-11 DIAGNOSIS — F32.1 MODERATE MAJOR DEPRESSION (H): Primary | ICD-10-CM

## 2022-07-11 DIAGNOSIS — Z12.4 CERVICAL CANCER SCREENING: ICD-10-CM

## 2022-07-11 DIAGNOSIS — F17.200 SMOKER: ICD-10-CM

## 2022-07-11 PROCEDURE — 99214 OFFICE O/P EST MOD 30 MIN: CPT | Mod: GT | Performed by: FAMILY MEDICINE

## 2022-07-11 ASSESSMENT — ANXIETY QUESTIONNAIRES
GAD7 TOTAL SCORE: 15
1. FEELING NERVOUS, ANXIOUS, OR ON EDGE: NEARLY EVERY DAY
GAD7 TOTAL SCORE: 15
5. BEING SO RESTLESS THAT IT IS HARD TO SIT STILL: SEVERAL DAYS
4. TROUBLE RELAXING: NEARLY EVERY DAY
2. NOT BEING ABLE TO STOP OR CONTROL WORRYING: MORE THAN HALF THE DAYS
3. WORRYING TOO MUCH ABOUT DIFFERENT THINGS: MORE THAN HALF THE DAYS
8. IF YOU CHECKED OFF ANY PROBLEMS, HOW DIFFICULT HAVE THESE MADE IT FOR YOU TO DO YOUR WORK, TAKE CARE OF THINGS AT HOME, OR GET ALONG WITH OTHER PEOPLE?: EXTREMELY DIFFICULT
7. FEELING AFRAID AS IF SOMETHING AWFUL MIGHT HAPPEN: NEARLY EVERY DAY
7. FEELING AFRAID AS IF SOMETHING AWFUL MIGHT HAPPEN: NEARLY EVERY DAY
6. BECOMING EASILY ANNOYED OR IRRITABLE: SEVERAL DAYS
GAD7 TOTAL SCORE: 15

## 2022-07-11 ASSESSMENT — PATIENT HEALTH QUESTIONNAIRE - PHQ9
SUM OF ALL RESPONSES TO PHQ QUESTIONS 1-9: 13
SUM OF ALL RESPONSES TO PHQ QUESTIONS 1-9: 13
10. IF YOU CHECKED OFF ANY PROBLEMS, HOW DIFFICULT HAVE THESE PROBLEMS MADE IT FOR YOU TO DO YOUR WORK, TAKE CARE OF THINGS AT HOME, OR GET ALONG WITH OTHER PEOPLE: EXTREMELY DIFFICULT

## 2022-07-11 NOTE — PROGRESS NOTES
Jessica is a 41 year old who is being evaluated via a billable video visit.      How would you like to obtain your AVS? MyChart  If the video visit is dropped, the invitation should be resent by: [phone]  Will anyone else be joining your video visit? No      Will seek out employee assistance for therapy. They will find her a therapist.       Assessment & Plan     Moderate major depression (H)  Anxiety  Possibly some worsening of apathy with increased fluoxetine without improvement in mood. Would like to trial back down to 20 mg dose. Consider buspar in future but interactions with her narcolepsy med currently- she can talk with sleep clinic doc about this option   Strongly encouraged starting therapy and she voiced understanding and plans to do this through work.   - FLUoxetine (PROZAC) 20 MG capsule; Take 1 capsule (20 mg) by mouth daily    Narcolepsy without cataplexy(347.00)  Fatigue, unspecified type  Ongoing sx's with poor control. Has consult coming up with new sleep clinic for further options.   Will continue current work restrictions given ongoing severe sx's. Will extend her rtw target date to 9/7//22. This will allow time to trial new treatments and give them time to become effective.      Smoker  Pneumococcal vaccine to be given next time she is seen in person    Cervical cancer screening  Has cpe appt scheduled later this year           Patient Instructions   Talk with sleep clinic about the option of adding buspirone (is this safe and interactions with your narcolepsy meds).     Schedule with therapist through work program.     Continue current work restrictions through 9/6/22              Return in about 7 weeks (around 8/29/2022) for med check, Follow up.    Liberty Alvarez MD  [facility]   Jessica is a 41 year old, presenting for the following health issues:  No chief complaint on file.      History of Present Illness       Mental Health  "Follow-up:  Patient presents to follow-up on Depression & Anxiety.Patient's depression since last visit has been:  No change  The patient is having other symptoms associated with depression.  Patient's anxiety since last visit has been:  No change  The patient is having other symptoms associated with anxiety.  Any significant life events: job concerns and health concerns  Patient is feeling anxious or having panic attacks.  Patient has no concerns about alcohol or drug use.    Reason for visit:  Follow up fluoxetine dose increase and fmla    She eats 4 or more servings of fruits and vegetables daily.She consumes 0 sweetened beverage(s) daily.She exercises with enough effort to increase her heart rate 10 to 19 minutes per day.  She exercises with enough effort to increase her heart rate 4 days per week.   She is taking medications regularly.    Today's PHQ-9         PHQ-9 Total Score: 13    PHQ-9 Q9 Thoughts of better off dead/self-harm past 2 weeks :   Not at all    How difficult have these problems made it for you to do your work, take care of things at home, or get along with other people: Extremely difficult  Today's BYRON-7 Score: 15     Doing \"okay\". Still challenging and waiting to see if other meds help.   Next sleep appt in on 7/26/22. New sleep provider with [facility].   Continues on xyrem. Still tired all the time, same as last visit.     Mood: \"eh\", \"empty\",   Increased fluoxetine last visit. Don't think its helping.   Continues to have loss of ambition. Not sure if this is the med or all other issues. Thinks possibly started few weeks after starting the higher dose.   No other AE.   Wanting to decrease fluoxetine back to 20 mg  Scheduling with therapist is on her list of things to do  Small social network however still   No SI    Work- has been on FMLA and working 50%.   Overall doing better working at 4 hrs per day.   Work is still stressful. Short staffed, production issues so still getting push to " get things done quickly (do it to herself partly). Not sure if she will be able to sustain at this job.   Feels 4 hours is as much as she can do.   rtw target date was 7/27/22. Didn't start the restrictions till 6/20/22.    Wrist pain- saw ortho  And pain is better now that she was bracing. Currently not having to brace it.    Scheduled physical in Oct.      PHQ-9 SCORE 3/5/2022 6/5/2022 7/11/2022   PHQ-9 Total Score MyChart 20 (Severe depression) 14 (Moderate depression) 13 (Moderate depression)   PHQ-9 Total Score 20 14 13     BYRON-7 SCORE 2/22/2022 6/5/2022 7/11/2022   Total Score 12 (moderate anxiety) 14 (moderate anxiety) 15 (severe anxiety)   Total Score 12 14 15       Review of Systems         Objective           Vitals:  No vitals were obtained today due to virtual visit.    Physical Exam   GENERAL: Healthy, alert and no distress  EYES: Eyes grossly normal to inspection.  No discharge or erythema, or obvious scleral/conjunctival abnormalities.  RESP: No audible wheeze, cough, or visible cyanosis.  No visible retractions or increased work of breathing.    SKIN: Visible skin clear. No significant rash, abnormal pigmentation or lesions.  NEURO: Cranial nerves grossly intact.  Mentation and speech appropriate for age.  PSYCH: Mentation appears normal, affect normal/bright, judgement and insight intact, normal speech and appearance well-groomed.              Video-Visit Details     Video Start Time: 10:50 AM    Type of service:  Video Visit    Video End Time:11:15 AM    Originating Location (pt. Location): Home    Distant Location (provider location):  [facility]     Platform used for Video Visit: [employer]  ..

## 2022-07-11 NOTE — PATIENT INSTRUCTIONS
Talk with sleep clinic about the option of adding buspirone (is this safe and interactions with your narcolepsy meds).     Schedule with therapist through work program.     Continue current work restrictions through 9/6/22

## 2022-07-14 ENCOUNTER — THERAPY VISIT (OUTPATIENT)
Dept: PHYSICAL THERAPY | Facility: CLINIC | Age: 42
End: 2022-07-14
Payer: COMMERCIAL

## 2022-07-14 DIAGNOSIS — G89.29 CHRONIC MIDLINE LOW BACK PAIN WITHOUT SCIATICA: ICD-10-CM

## 2022-07-14 DIAGNOSIS — M25.511 CHRONIC RIGHT SHOULDER PAIN: Primary | ICD-10-CM

## 2022-07-14 DIAGNOSIS — M54.50 CHRONIC MIDLINE LOW BACK PAIN WITHOUT SCIATICA: ICD-10-CM

## 2022-07-14 DIAGNOSIS — G89.29 CHRONIC RIGHT SHOULDER PAIN: Primary | ICD-10-CM

## 2022-07-14 PROCEDURE — 97110 THERAPEUTIC EXERCISES: CPT | Mod: GP | Performed by: PHYSICAL THERAPIST

## 2022-07-14 PROCEDURE — 97112 NEUROMUSCULAR REEDUCATION: CPT | Mod: GP | Performed by: PHYSICAL THERAPIST

## 2022-07-17 ENCOUNTER — HEALTH MAINTENANCE LETTER (OUTPATIENT)
Age: 42
End: 2022-07-17

## 2022-08-22 ENCOUNTER — MYC MEDICAL ADVICE (OUTPATIENT)
Dept: PULMONOLOGY | Facility: OTHER | Age: 42
End: 2022-08-22

## 2022-08-29 ENCOUNTER — TELEPHONE (OUTPATIENT)
Dept: FAMILY MEDICINE | Facility: CLINIC | Age: 42
End: 2022-08-29

## 2022-08-29 NOTE — TELEPHONE ENCOUNTER
Called pt and LVM. Unfortunately Dr. Castillo is out of the office today and pt will need to reschedule her virtual visit.

## 2022-09-01 ENCOUNTER — MYC MEDICAL ADVICE (OUTPATIENT)
Dept: FAMILY MEDICINE | Facility: CLINIC | Age: 42
End: 2022-09-01

## 2022-09-06 NOTE — TELEPHONE ENCOUNTER
Please add clinic stamp to page 2 of forms and fax (see patient's message). Please update her via [employer] once completed.

## 2022-09-07 ENCOUNTER — TELEPHONE (OUTPATIENT)
Dept: FAMILY MEDICINE | Facility: CLINIC | Age: 42
End: 2022-09-07

## 2022-09-24 ENCOUNTER — HEALTH MAINTENANCE LETTER (OUTPATIENT)
Age: 42
End: 2022-09-24

## 2022-09-27 ENCOUNTER — VIRTUAL VISIT (OUTPATIENT)
Dept: FAMILY MEDICINE | Facility: CLINIC | Age: 42
End: 2022-09-27
Payer: COMMERCIAL

## 2022-09-27 DIAGNOSIS — R79.0 ABNORMAL IRON SATURATION: ICD-10-CM

## 2022-09-27 DIAGNOSIS — R53.83 FATIGUE, UNSPECIFIED TYPE: ICD-10-CM

## 2022-09-27 DIAGNOSIS — G47.419 NARCOLEPSY WITHOUT CATAPLEXY(347.00): Primary | ICD-10-CM

## 2022-09-27 DIAGNOSIS — F32.1 MODERATE MAJOR DEPRESSION (H): ICD-10-CM

## 2022-09-27 DIAGNOSIS — F41.9 ANXIETY: ICD-10-CM

## 2022-09-27 PROCEDURE — 99214 OFFICE O/P EST MOD 30 MIN: CPT | Mod: GT | Performed by: FAMILY MEDICINE

## 2022-09-27 NOTE — PROGRESS NOTES
Jessica is a 41 year old who is being evaluated via a billable video visit.      How would you like to obtain your AVS? MyChart  If the video visit is dropped, the invitation should be resent by: Text to cell phone: [phone]  Will anyone else be joining your video visit? No          Assessment & Plan     Narcolepsy without cataplexy(347.00)  Fatigue, unspecified type  Working with sleep clinic, new provider. So far, not having found anything that is helping her sx's. She is ok with rtw with no restrictions to see how it goes. Reports her work is fairly flexible. May need to drop fte if struggles going forward.   Offered second opinion with other provider for her sx's but she would like to hold off on that for now     Moderate major depression (H)  Anxiety  Reports most of her sx's are due to above and not interested in further med change/psych eval (has had in past). Continue current fluoxetine dosing.     Abnormal iron saturation  Noted on labs from [facility]. Ferritin low nl  (pointing away from iron overload) but  iron sat and serum iron elevated. She does not supplement iron. Will repeat labs, if continue to be abnormal would refer onto heme for further eval given discrepancy in results, ? HH testing needed.   - Iron and iron binding capacity; Future  - Ferritin; Future  - CBC with platelets and differential; Future     she has cpe scheduled in the near future.   Will update vaccines at that time  Return in about 3 weeks (around 10/18/2022) for Routine preventive.    Liberty Alvarez MD  [facility]    Jett Lopez is a 41 year old, presenting for the following health issues:  Recheck Medication and Results      History of Present Illness       Mental Health Follow-up:  Patient presents to follow-up on Depression.Patient's depression since last visit has been:  Medium  The patient is not having other symptoms associated with depression.      Any significant life  "events: health concerns  Patient is not feeling anxious or having panic attacks.  Patient has no concerns about alcohol or drug use.    Reason for visit:  Follow up and new lab results    She eats 2-3 servings of fruits and vegetables daily.She consumes 0 sweetened beverage(s) daily.She exercises with enough effort to increase her heart rate 9 or less minutes per day.  She exercises with enough effort to increase her heart rate 3 or less days per week.   She is taking medications regularly.    Today's PHQ-9         PHQ-9 Total Score: 17    PHQ-9 Q9 Thoughts of better off dead/self-harm past 2 weeks :   Not at all    How difficult have these problems made it for you to do your work, take care of things at home, or get along with other people: Extremely difficult     FMLA was recently extended for her. restrictions from 9/7/22-10/12/2022.  the restrictions to allow me to work up to 7 hours per day with breaks as needed.    Following with new sleep provider. Recommended a new sleep study but patient can't take the time to do this.   Has been trying few new meds.   Tried trazodone plus hydroxyzine but that wasn't helpful.   Also tried ambien and \"that is terrible\" as hard to wake the next day and seemed to be affecting mood.   adderall didn't seem to help much.   Next f/u with sleep provider next week.     Does not supplement iron. Recent iron level high and ferritin was low.     Hoping to rtw and just \"push through\"  Feels mood scores are primarily sleep related.     No fever, chills, new physical sx's.    Has cpe scheduled next week.          PHQ-9 SCORE 6/5/2022 7/11/2022 9/27/2022   PHQ-9 Total Score MyChart 14 (Moderate depression) 13 (Moderate depression) 17 (Moderately severe depression)   PHQ-9 Total Score 14 13 17     BYRON-7 SCORE 2/22/2022 6/5/2022 7/11/2022   Total Score 12 (moderate anxiety) 14 (moderate anxiety) 15 (severe anxiety)   Total Score 12 14 15             Objective           Vitals:  No vitals " were obtained today due to virtual visit.    Physical Exam   GENERAL: Healthy, alert and no distress  EYES: Eyes grossly normal to inspection.  No discharge or erythema, or obvious scleral/conjunctival abnormalities.  RESP: No audible wheeze, cough, or visible cyanosis.  No visible retractions or increased work of breathing.    SKIN: Visible skin clear. No significant rash, abnormal pigmentation or lesions.  NEURO: Cranial nerves grossly intact.  Mentation and speech appropriate for age.  PSYCH: Mentation appears normal, affect normal/bright, judgement and insight intact, normal speech and appearance well-groomed.                Video-Visit Details    Video Start Time: 8:53 AM    Type of service:  Video Visit    Video End Time:9:08 AM    Originating Location (pt. Location): Home    Distant Location (provider location):  [facility]     Platform used for Video Visit: [employer]

## 2022-09-28 DIAGNOSIS — F41.9 ANXIETY: ICD-10-CM

## 2022-10-03 ENCOUNTER — OFFICE VISIT (OUTPATIENT)
Dept: FAMILY MEDICINE | Facility: CLINIC | Age: 42
End: 2022-10-03
Payer: COMMERCIAL

## 2022-10-03 VITALS
SYSTOLIC BLOOD PRESSURE: 110 MMHG | TEMPERATURE: 98.4 F | RESPIRATION RATE: 16 BRPM | DIASTOLIC BLOOD PRESSURE: 64 MMHG | HEIGHT: 63 IN | OXYGEN SATURATION: 99 % | HEART RATE: 99 BPM | BODY MASS INDEX: 20.38 KG/M2 | WEIGHT: 115 LBS

## 2022-10-03 DIAGNOSIS — Z12.4 CERVICAL CANCER SCREENING: ICD-10-CM

## 2022-10-03 DIAGNOSIS — N63.20 MASS OF LEFT BREAST, UNSPECIFIED QUADRANT: ICD-10-CM

## 2022-10-03 DIAGNOSIS — Z00.00 ROUTINE GENERAL MEDICAL EXAMINATION AT A HEALTH CARE FACILITY: Primary | ICD-10-CM

## 2022-10-03 DIAGNOSIS — R00.2 PALPITATIONS: ICD-10-CM

## 2022-10-03 DIAGNOSIS — G47.419 NARCOLEPSY WITHOUT CATAPLEXY(347.00): ICD-10-CM

## 2022-10-03 DIAGNOSIS — R09.81 NASAL CONGESTION: ICD-10-CM

## 2022-10-03 PROCEDURE — 99213 OFFICE O/P EST LOW 20 MIN: CPT | Mod: 25 | Performed by: FAMILY MEDICINE

## 2022-10-03 PROCEDURE — 93000 ELECTROCARDIOGRAM COMPLETE: CPT | Performed by: FAMILY MEDICINE

## 2022-10-03 PROCEDURE — 99395 PREV VISIT EST AGE 18-39: CPT | Performed by: FAMILY MEDICINE

## 2022-10-03 RX ORDER — FLUTICASONE PROPIONATE 50 MCG
1 SPRAY, SUSPENSION (ML) NASAL DAILY
Qty: 16 G | Refills: 5 | Status: SHIPPED | OUTPATIENT
Start: 2022-10-03 | End: 2023-11-08

## 2022-10-03 RX ORDER — TRAZODONE HYDROCHLORIDE 50 MG/1
TABLET, FILM COATED ORAL
COMMUNITY
Start: 2022-08-17 | End: 2023-02-08

## 2022-10-03 RX ORDER — DEXTROAMPHETAMINE SACCHARATE, AMPHETAMINE ASPARTATE, DEXTROAMPHETAMINE SULFATE AND AMPHETAMINE SULFATE 1.25; 1.25; 1.25; 1.25 MG/1; MG/1; MG/1; MG/1
TABLET ORAL
COMMUNITY
Start: 2022-08-30 | End: 2023-02-08

## 2022-10-03 RX ORDER — HYDROXYZINE HYDROCHLORIDE 25 MG/1
TABLET, FILM COATED ORAL
Qty: 20 TABLET | Refills: 0 | Status: SHIPPED | OUTPATIENT
Start: 2022-10-03 | End: 2023-02-08

## 2022-10-03 ASSESSMENT — ENCOUNTER SYMPTOMS
EYE PAIN: 0
PARESTHESIAS: 0
CONSTIPATION: 0
SORE THROAT: 0
ABDOMINAL PAIN: 0
PALPITATIONS: 1
HEMATOCHEZIA: 0
NAUSEA: 0
NERVOUS/ANXIOUS: 1
COUGH: 0
MYALGIAS: 1
DIARRHEA: 0
FREQUENCY: 1
WEAKNESS: 1
BREAST MASS: 0
ARTHRALGIAS: 1
HEADACHES: 0
DIZZINESS: 0
CHILLS: 0
DYSURIA: 0
HEMATURIA: 0
FEVER: 0
JOINT SWELLING: 0
SHORTNESS OF BREATH: 0
HEARTBURN: 0

## 2022-10-03 ASSESSMENT — PATIENT HEALTH QUESTIONNAIRE - PHQ9
SUM OF ALL RESPONSES TO PHQ QUESTIONS 1-9: 18
10. IF YOU CHECKED OFF ANY PROBLEMS, HOW DIFFICULT HAVE THESE PROBLEMS MADE IT FOR YOU TO DO YOUR WORK, TAKE CARE OF THINGS AT HOME, OR GET ALONG WITH OTHER PEOPLE: EXTREMELY DIFFICULT
SUM OF ALL RESPONSES TO PHQ QUESTIONS 1-9: 18

## 2022-10-03 ASSESSMENT — PAIN SCALES - GENERAL: PAINLEVEL: MILD PAIN (2)

## 2022-10-03 NOTE — PROGRESS NOTES
SUBJECTIVE:   CC: Jessica is an 41 year old who presents for preventive health visit.       Patient has been advised of split billing requirements and indicates understanding: Yes  Healthy Habits:     Getting at least 3 servings of Calcium per day:  NO    Bi-annual eye exam:  NO    Dental care twice a year:  NO    Sleep apnea or symptoms of sleep apnea:  Daytime drowsiness    Diet:  Regular (no restrictions)    Frequency of exercise:  2-3 days/week    Duration of exercise:  Less than 15 minutes    Taking medications regularly:  Yes    Medication side effects:  Muscle aches, Lightheadedness and Other    PHQ-2 Total Score: 3    Additional concerns today:  Yes      Pt having concerns about allergies and congestion. Pt also requesting workability form to filled.     Allergies flared since early August. Hard to breath through nose.   Benadryl and sudafed prn. flonase intermittently   Non-sedating anti-histamines not effective      Today's PHQ-2 Score:   PHQ-2 (  [employer]) 10/3/2022   Q1: Little interest or pleasure in doing things 2   Q2: Feeling down, depressed or hopeless 1   PHQ-2 Score 3   PHQ-2 Total Score (12-17 Years)- Positive if 3 or more points; Administer PHQ-A if positive -   Q1: Little interest or pleasure in doing things More than half the days   Q2: Feeling down, depressed or hopeless Several days   PHQ-2 Score 3       Abuse: Current or Past (Physical, Sexual or Emotional) - No  Do you feel safe in your environment? Yes        Social History     Tobacco Use     Smoking status: Former Smoker     Packs/day: 0.10     Years: 20.00     Pack years: 2.00     Types: Cigarettes     Start date: 1998     Quit date: 2022     Years since quittin.3     Smokeless tobacco: Never Used     Tobacco comment: used to promote wakefulness   Substance Use Topics     Alcohol use: Yes     Comment: occasional         Alcohol Use 10/3/2022   Prescreen: >3 drinks/day or >7 drinks/week? No     Reviewed orders with  "patient.  Reviewed health maintenance and updated orders accordingly - Yes    Breast Cancer Screening:    FHS-7:   Breast CA Risk Assessment (FHS-7) 6/3/2021 10/3/2022   Did any of your first-degree relatives have breast or ovarian cancer? Yes Yes   Did any of your relatives have bilateral breast cancer? No No   Did any man in your family have breast cancer? No No   Did any woman in your family have breast and ovarian cancer? Yes Yes   Did any woman in your family have breast cancer before age 50 y? No No   Do you have 2 or more relatives with breast and/or ovarian cancer? No No   Do you have 2 or more relatives with breast and/or bowel cancer? No No     No fam hx of ovarian cancer.   Not interested in meeting with  to evaluate cancer risk.   Doesn't want to start mammograms. Feels \"nature\" needs to take its course.       Mammogram Screening: Recommended annual mammography  Pertinent mammograms are reviewed under the imaging tab.    History of abnormal Pap smear: NO - age 30-65 PAP every 5 years with negative HPV co-testing recommended  However, patient declines Pap smear     Reviewed and updated as needed this visit by clinical staff   Tobacco  Allergies  Meds   Med Hx  Surg Hx  Fam Hx  Soc Hx          Reviewed and updated as needed this visit by Provider                     Review of Systems   Constitutional: Negative for chills and fever.   HENT: Positive for congestion. Negative for ear pain, hearing loss and sore throat.    Eyes: Negative for pain and visual disturbance.   Respiratory: Negative for cough and shortness of breath.    Cardiovascular: Positive for palpitations. Negative for chest pain and peripheral edema.   Gastrointestinal: Negative for abdominal pain, constipation, diarrhea, heartburn, hematochezia and nausea.   Breasts:  Negative for tenderness, breast mass and discharge.   Genitourinary: Positive for frequency and urgency. Negative for dysuria, genital sores, hematuria, pelvic " "pain, vaginal bleeding and vaginal discharge.   Musculoskeletal: Positive for arthralgias and myalgias. Negative for joint swelling.   Skin: Negative for rash.   Neurological: Positive for weakness. Negative for dizziness, headaches and paresthesias.   Psychiatric/Behavioral: Positive for mood changes. The patient is nervous/anxious.       shoulder is fine with the PHYSICAL THERAPY.   - little more urinary frequency since starting fluoxetine. occ urgency. No dysuria. Declines UA.     CV- chronic palpitations. Echo in 2017 and heart monitor completed. Benign w/u. No progression of sx's since then.     Declines vaccines today.     Menses are about 1 x a month but slight irregular. little lighter recently\  Bleeding 3-4 days. Cramps first day.   Not currently sexually active. Not interested in birth contrl    Declines pap smear     ADDERALL causes jittery feeling.  Usually will take Benadryl or hydroxyzine with the Adderall to help moderate the side effects.  She has follow-up upcoming with her sleep clinic to review    OBJECTIVE:   /64   Pulse 99   Temp 98.4  F (36.9  C)   Resp 16   Ht 1.6 m (5' 3\")   Wt 52.2 kg (115 lb)   LMP 09/19/2022 (Approximate)   SpO2 99%   BMI 20.37 kg/m    Physical Exam  GENERAL: healthy, alert and no distress  EYES: Eyes grossly normal to inspection, PERRL and conjunctivae and sclerae normal  HENT: ear canals and TM's normal, nose and mouth without ulcers or lesions  NECK: no adenopathy, no asymmetry, masses, or scars and thyroid normal to palpation  RESP: lungs clear to auscultation - no rales, rhonchi or wheezes  BREAST: Left breast approx 6-7 mm nodular hard tissue about 5 cm from nipple at 11 oclock, otherwise normal without masses, tenderness or nipple discharge and no palpable axillary masses or adenopathy  CV: Borderline tachycardic rate and occasional ectopy heard, normal S1 S2, no S3 or S4, no murmur, click or rub, no peripheral edema and peripheral pulses " strong  ABDOMEN: soft, nontender, no hepatosplenomegaly, no masses and bowel sounds normal   (female): deferred, declined by patient  MS: no gross musculoskeletal defects noted, no edema  SKIN: no suspicious lesions or rashes  NEURO: Normal strength and tone, mentation intact and speech normal  PSYCH: mentation appears normal, affect normal/bright    EKG - appears normal, NSR, normal axis, normal intervals, no acute ST/T changes c/w ischemia, no LVH by voltage criteria, there are no prior tracings available      ASSESSMENT/PLAN:   (Z00.00) Routine general medical examination at a health care facility  (primary encounter diagnosis)    (G47.419) Narcolepsy without cataplexy(347.00)  Comment: Continues to struggle with fatigue, following with sleep clinic, currently on Adderall  Plan: Ongoing follow-up with sleep clinic and directions for them.  I have signed her form to return to work with no restrictions    (N63.20) Mass of left breast, unspecified quadrant  Comment: Firm nodule left breast found on exam today.  Unclear if this is fibrocystic change versus a possible malignancy.  This finding is reviewed with patient and further imaging for evaluation recommended  Plan: Patient is pretty adamant she does not want to get a mammogram completed or imaging of her breast as she does not want to treat cancer if it were to be present.  We discussed the value at least in knowing what is going on with her body and then being able to make decisions following that.  She will consider but is pretty adamant that she does not want to pursue this at this time.    (R00.2) Palpitations  Comment: Chronic, benign work-up in the past.  Mild ectopy on exam today so updated EKG completed  Plan: EKG 12-lead complete w/read - Clinics, Basic         metabolic panel  (Ca, Cl, CO2, Creat, Gluc, K,         Na, BUN)        Reassuring EKG.  Continue to monitor and consider further work-up if symptoms escalate    (R09.81) Nasal  "congestion  Comment: Not controlled with as needed Benadryl  Plan: fluticasone (FLONASE) 50 MCG/ACT nasal spray        Recommended adding back in Flonase every day to help control symptoms.    (Z12.4) Cervical cancer screening  Comment: Strongly recommended Pap screening today  Plan: As noted above she declines screening as she notes she would not treat her cancer if it were present.  She understands that the cancers can be very treatable if found early but would rather let \"nature take its course\" to help prevent over population of the plan.  Reports she is helping slowly for a long time.  It is unclear how much her depression could be contributing to it.        COUNSELING:  Reviewed preventive health counseling, as reflected in patient instructions       Regular exercise       Healthy diet/nutrition       Vision screening       Contraception       Family planning       Osteoporosis prevention/bone health    Estimated body mass index is 20.37 kg/m  as calculated from the following:    Height as of this encounter: 1.6 m (5' 3\").    Weight as of this encounter: 52.2 kg (115 lb).        She reports that she quit smoking about 3 months ago. Her smoking use included cigarettes. She started smoking about 24 years ago. She has a 2.00 pack-year smoking history. She has never used smokeless tobacco.      Counseling Resources:  ATP IV Guidelines  Pooled Cohorts Equation Calculator  Breast Cancer Risk Calculator  BRCA-Related Cancer Risk Assessment: FHS-7 Tool  FRAX Risk Assessment  ICSI Preventive Guidelines  Dietary Guidelines for Americans, 2010  USDA's MyPlate  ASA Prophylaxis  Lung CA Screening    Liberty Alvarez MD  [facility]  Answers for HPI/ROS submitted by the patient on 10/3/2022  If you checked off any problems, how difficult have these problems made it for you to do your work, take care of things at home, or get along with other people?: Extremely difficult  PHQ9 TOTAL SCORE: " 18

## 2022-10-03 NOTE — PATIENT INSTRUCTIONS
Start daily saline sprays for the nose (use several times a day) or consider niall pot daily with distilled water.   Use flonase after the saline.     Vaccines eligible for:  Pneumococcal  Influenza  Covid-19 booster        Preventive Health Recommendations  Female Ages 26 - 39  Yearly exam:   See your health care provider every year in order to    Review health changes.     Discuss preventive care.      Review your medicines if you your doctor has prescribed any.    Until age 30: Get a Pap test every three years (more often if you have had an abnormal result).    After age 30: Talk to your doctor about whether you should have a Pap test every 3 years or have a Pap test with HPV screening every 5 years.   You do not need a Pap test if your uterus was removed (hysterectomy) and you have not had cancer.  You should be tested each year for STDs (sexually transmitted diseases), if you're at risk.   Talk to your provider about how often to have your cholesterol checked.  If you are at risk for diabetes, you should have a diabetes test (fasting glucose).  Shots: Get a flu shot each year. Get a tetanus shot every 10 years.   Nutrition:     Eat at least 5 servings of fruits and vegetables each day.    Eat whole-grain bread, whole-wheat pasta and brown rice instead of white grains and rice.    Get adequate Calcium and Vitamin D.     Lifestyle    Exercise at least 150 minutes a week (30 minutes a day, 5 days of the week). This will help you control your weight and prevent disease.    Limit alcohol to one drink per day.    No smoking.     Wear sunscreen to prevent skin cancer.    See your dentist every six months for an exam and cleaning.

## 2022-10-05 ENCOUNTER — TELEPHONE (OUTPATIENT)
Dept: FAMILY MEDICINE | Facility: CLINIC | Age: 42
End: 2022-10-05

## 2022-10-05 ENCOUNTER — LAB (OUTPATIENT)
Dept: LAB | Facility: CLINIC | Age: 42
End: 2022-10-05
Payer: COMMERCIAL

## 2022-10-05 DIAGNOSIS — R00.2 PALPITATIONS: ICD-10-CM

## 2022-10-05 DIAGNOSIS — R79.0 ABNORMAL IRON SATURATION: ICD-10-CM

## 2022-10-05 LAB
ANION GAP SERPL CALCULATED.3IONS-SCNC: 4 MMOL/L (ref 3–14)
BASOPHILS # BLD AUTO: 0 10E3/UL (ref 0–0.2)
BASOPHILS NFR BLD AUTO: 1 %
BUN SERPL-MCNC: 11 MG/DL (ref 7–30)
CALCIUM SERPL-MCNC: 9.1 MG/DL (ref 8.5–10.1)
CHLORIDE BLD-SCNC: 107 MMOL/L (ref 94–109)
CO2 SERPL-SCNC: 29 MMOL/L (ref 20–32)
CREAT SERPL-MCNC: 0.72 MG/DL (ref 0.52–1.04)
EOSINOPHIL # BLD AUTO: 0.3 10E3/UL (ref 0–0.7)
EOSINOPHIL NFR BLD AUTO: 7 %
ERYTHROCYTE [DISTWIDTH] IN BLOOD BY AUTOMATED COUNT: 12.6 % (ref 10–15)
FERRITIN SERPL-MCNC: 29 NG/ML (ref 12–150)
GFR SERPL CREATININE-BSD FRML MDRD: >90 ML/MIN/1.73M2
GLUCOSE BLD-MCNC: 95 MG/DL (ref 70–99)
HCT VFR BLD AUTO: 38.6 % (ref 35–47)
HGB BLD-MCNC: 12.9 G/DL (ref 11.7–15.7)
IRON SATN MFR SERPL: 50 % (ref 15–46)
IRON SERPL-MCNC: 186 UG/DL (ref 35–180)
LYMPHOCYTES # BLD AUTO: 1 10E3/UL (ref 0.8–5.3)
LYMPHOCYTES NFR BLD AUTO: 29 %
MCH RBC QN AUTO: 29.6 PG (ref 26.5–33)
MCHC RBC AUTO-ENTMCNC: 33.4 G/DL (ref 31.5–36.5)
MCV RBC AUTO: 89 FL (ref 78–100)
MONOCYTES # BLD AUTO: 0.4 10E3/UL (ref 0–1.3)
MONOCYTES NFR BLD AUTO: 10 %
NEUTROPHILS # BLD AUTO: 1.9 10E3/UL (ref 1.6–8.3)
NEUTROPHILS NFR BLD AUTO: 53 %
PLATELET # BLD AUTO: 194 10E3/UL (ref 150–450)
POTASSIUM BLD-SCNC: 3.9 MMOL/L (ref 3.4–5.3)
RBC # BLD AUTO: 4.36 10E6/UL (ref 3.8–5.2)
SODIUM SERPL-SCNC: 140 MMOL/L (ref 133–144)
TIBC SERPL-MCNC: 370 UG/DL (ref 240–430)
WBC # BLD AUTO: 3.7 10E3/UL (ref 4–11)

## 2022-10-05 PROCEDURE — 82728 ASSAY OF FERRITIN: CPT

## 2022-10-05 PROCEDURE — 36415 COLL VENOUS BLD VENIPUNCTURE: CPT

## 2022-10-05 PROCEDURE — 83550 IRON BINDING TEST: CPT

## 2022-10-05 PROCEDURE — 85025 COMPLETE CBC W/AUTO DIFF WBC: CPT

## 2022-10-05 PROCEDURE — 80048 BASIC METABOLIC PNL TOTAL CA: CPT

## 2022-10-05 NOTE — TELEPHONE ENCOUNTER
Forms    Type of form/letter:  Form received from  [facility], placed on Dr. Alvarez's desk for signature    Have you been seen for this request:    Do we have the form/letter:     When is form/letter needed by:     How would you like the form/letter returned:     Patient Notified form requests are processed in 3-5 business days:    Okay to leave a detailed message?:

## 2022-10-06 NOTE — TELEPHONE ENCOUNTER
Forms    Type of form/letter:   Completed form received from [facility] faxed to [phone] and sent to abstraction for scanning into patients chart    Have you been seen for this request:    Do we have the form/letter:     When is form/letter needed by:     How would you like the form/letter returned:     Patient Notified form requests are processed in 3-5 business days:    Okay to leave a detailed message?:

## 2022-10-07 ENCOUNTER — TELEPHONE (OUTPATIENT)
Dept: FAMILY MEDICINE | Facility: CLINIC | Age: 42
End: 2022-10-07

## 2022-10-07 DIAGNOSIS — R53.83 FATIGUE, UNSPECIFIED TYPE: ICD-10-CM

## 2022-10-07 DIAGNOSIS — R79.0 RAISED SERUM IRON: Primary | ICD-10-CM

## 2022-10-07 NOTE — RESULT ENCOUNTER NOTE
Jessica,  Thanks for coming in for the repeat labs.  The kidney and electrolyte panel looked good.  The blood count panel overall is okay (the total white blood cell count dropped a little but you continue to maintain adequate numbers of individual different types of white blood cells).   Your iron levels are unchanged from last check.  The serum iron continues to be slightly elevated but the body stores of iron (ferritin) are low normal.  Usually when the body has excess on iron both of these values are increased.  I think it might be worthwhile to have a visit with a hematologist (blood specialist) to review these results and help us sort through the discrepancy.  High iron can cause fatigue so it is worth looking into.  I will place a referral for this.  Please MyChart or call if you have any concerns or questions.   Sincerely,  Liberty Alvarez MD

## 2022-10-07 NOTE — TELEPHONE ENCOUNTER
Please provider requested documentation for records provided appropriate jamie has been completed.

## 2022-10-07 NOTE — TELEPHONE ENCOUNTER
Request paper work cause pt is not working fulltime, the office vist notes. fax: [phone]  yessenia- insurance paper work. I also told her to have pt call in and they could . Yessenia would like a follow up call.

## 2022-10-07 NOTE — TELEPHONE ENCOUNTER
Forms    Type of form/letter:  Form received from  [facility], placed on Dr. Alvarez's desk for signature    Have you been seen for this request:    Do we have the form/letter:     When is form/letter needed by:     How would you like the form/letter returned:     Patient Notified form requests are processed in 3-5 business days:    Okay to leave a detailed message?:

## 2022-10-11 NOTE — TELEPHONE ENCOUNTER
RECORDS STATUS - ALL OTHER DIAGNOSIS      RECORDS RECEIVED FROM: Raised serum iron , Fatigue/ Referral Antonio internal   DATE RECEIVED: 10/11/2022   NOTES STATUS DETAILS   OFFICE NOTE from referring provider internal 10/07/2022  Liberty Alvarez   MEDICATION LIST Internal    LABS     Iron and Iron binding Internal 10/05/2022  07/28/2022 CE

## 2022-10-14 ENCOUNTER — VIRTUAL VISIT (OUTPATIENT)
Dept: ONCOLOGY | Facility: CLINIC | Age: 42
End: 2022-10-14
Attending: FAMILY MEDICINE
Payer: COMMERCIAL

## 2022-10-14 ENCOUNTER — PRE VISIT (OUTPATIENT)
Dept: ONCOLOGY | Facility: CLINIC | Age: 42
End: 2022-10-14

## 2022-10-14 DIAGNOSIS — R79.89 HIGH SERUM TRANSFERRIN SATURATION: Primary | ICD-10-CM

## 2022-10-14 DIAGNOSIS — R53.83 FATIGUE, UNSPECIFIED TYPE: ICD-10-CM

## 2022-10-14 DIAGNOSIS — R79.0 RAISED SERUM IRON: ICD-10-CM

## 2022-10-14 DIAGNOSIS — D72.819 LEUKOPENIA, UNSPECIFIED TYPE: ICD-10-CM

## 2022-10-14 PROCEDURE — 99204 OFFICE O/P NEW MOD 45 MIN: CPT | Mod: GT | Performed by: INTERNAL MEDICINE

## 2022-10-14 RX ORDER — ZALEPLON 10 MG/1
10 CAPSULE ORAL
COMMUNITY
Start: 2022-10-04 | End: 2023-02-08

## 2022-10-14 NOTE — LETTER
10/14/2022         RE: Jessica Mello  [address]  [place] [address]        Dear Colleague,    Thank you for referring your patient, Jessica Mello, to the [facility]. Please see a copy of my visit note below.    Jessica is a 41 year old who is being evaluated via a billable video visit.  [place].    How would you like to obtain your AVS? MyChart  If the video visit is dropped, the invitation should be resent by: Text to cell phone: [phone]  Will anyone else be joining your video visit? No     HONG Esparza      Video-Visit Details    Video Start Time: 8:44 AM    Type of service:  Video Visit    Video End Time:9:15 AM    Originating Location (pt. Location): Home        Distant Location (provider location):  [facility]    Platform used for Video Visit: [facility]    Hematology/Oncology New Patient Note      Today's Date: 10/14/22    Reason for Consultation: Elevated iron, fatigue  Referring Provider: Liberty Alvarez MD      HISTORY OF PRESENT ILLNESS: Jessica Mello is a 41 year old female who is referred for elevated iron. Past medical history is significant for depression and chronic fatigue.     She has chronic fatigue. She has to sleep every few hours. She was previously on disability, but now is working part-time as a . She has difficulty doing ADLs due to fatigue. She has had rheumatologic evaluation previously for an elevated KEIRY. She denies history of anemia. She has never been on iron supplementation or OTC vitamins.     Patient has limited labs within our system. Record review shows normal CBC. She was seen by a sleep specialist who ordered iron studies. These have been tested in July and October 2022 showing low normal ferritin stores and elevated iron.     Last year, she reports weight loss of 5-10 lbs, of which she has gained back. No fever, drenching night sweats,  LAD. No gross evidence of bleed.    Menses started at the age of 17 yo. Regular, not excessively heavy.     Medication list review: she has been on adderall and sonata for a month. Prozac one year. Atarax just a few times a week.    Family history: Mother diagnosed with breast cancer, age 65. No family history of CRC. No family history of liver disease/phlebotomy.     Cancer screening: pap smear overdue. Not of age yet for mammograms or screening colonoscopy.        REVIEW OF SYSTEMS:   A 14 point ROS was reviewed with pertinent positives and negatives in the HPI.        HOME MEDICATIONS:  Current Outpatient Medications   Medication Sig Dispense Refill     amphetamine-dextroamphetamine (ADDERALL) 5 MG tablet        FLUoxetine (PROZAC) 20 MG capsule Take 1 capsule (20 mg) by mouth daily 90 capsule 1     fluticasone (FLONASE) 50 MCG/ACT nasal spray Spray 1 spray into both nostrils daily 16 g 5     hydrOXYzine (ATARAX) 25 MG tablet Take ONE TO TWO tablets (25-50 mg) by mouth THREE times daily as needed for sleep/anxiety 20 tablet 0     VITAMIN D PO Take 2,000 Int'l Units by mouth daily       zaleplon (SONATA) 10 MG capsule Take 10 mg by mouth       traZODone (DESYREL) 50 MG tablet  (Patient not taking: Reported on 10/14/2022)           ALLERGIES:  Allergies   Allergen Reactions     Bupropion Unknown     Modafinil Unknown     Penicillins Hives         PAST MEDICAL HISTORY:  Past Medical History:   Diagnosis Date     Depressive disorder 6/2020         PAST SURGICAL HISTORY:  Past Surgical History:   Procedure Laterality Date     NO HISTORY OF SURGERY           SOCIAL HISTORY:  Social History     Socioeconomic History     Marital status: Single     Spouse name: Not on file     Number of children: Not on file     Years of education: Not on file     Highest education level: Not on file   Occupational History     Not on file   Tobacco Use     Smoking status: Former     Packs/day: 0.10     Years: 20.00     Pack years: 2.00      Types: Cigarettes     Start date: 1998     Quit date: 2022     Years since quittin.3     Smokeless tobacco: Never     Tobacco comments:     used to promote wakefulness   Vaping Use     Vaping Use: Never used   Substance and Sexual Activity     Alcohol use: Yes     Comment: 3-4 drinks per week.     Drug use: Yes     Types: Marijuana     Comment: intermittent use for pain/sleep- few times a month     Sexual activity: Not Currently     Birth control/protection: None   Other Topics Concern     Parent/sibling w/ CABG, MI or angioplasty before 65F 55M? No   Social History Narrative     Not on file     Social Determinants of Health     Financial Resource Strain: Low Risk      Difficulty of Paying Living Expenses: Not very hard   Food Insecurity: No Food Insecurity     Worried About Running Out of Food in the Last Year: Never true     Ran Out of Food in the Last Year: Never true   Transportation Needs: No Transportation Needs     Lack of Transportation (Medical): No     Lack of Transportation (Non-Medical): No   Physical Activity: Insufficiently Active     Days of Exercise per Week: 2 days     Minutes of Exercise per Session: 20 min   Stress: Stress Concern Present     Feeling of Stress : Very much   Social Connections: Socially Isolated     Frequency of Communication with Friends and Family: Once a week     Frequency of Social Gatherings with Friends and Family: Once a week     Attends Jain Services: Never     Active Member of Clubs or Organizations: Yes     Attends Club or Organization Meetings: Not on file     Marital Status: Never    Intimate Partner Violence: Not on file   Housing Stability: Low Risk      Unable to Pay for Housing in the Last Year: No     Number of Places Lived in the Last Year: 2     Unstable Housing in the Last Year: No         FAMILY HISTORY:  Family History   Problem Relation Age of Onset     Hypertension Mother      Breast Cancer Mother 65     Hyperlipidemia Father       Depression Sister      Anxiety Disorder Sister      Thyroid Disease Sister      Narcolepsy Sister      Thyroid Disease Maternal Grandmother      Depression Sister      Attention Deficit Disorder Sister      Colon Cancer Paternal Uncle         dx after age 50         PHYSICAL EXAM:  ECO  GENERAL/CONSTITUTIONAL: No acute distress. Healthy, alert. Fatigued, tearful.  EYES: No scleral icterus.  No redness or discharge.    RESPIRATORY: No audible wheeze, cough, or visible cyanosis.  No visible retractions or increased work of breathing.  Able to speak fully in complete sentences.  MUSCULOSKELETAL: Normal range of motion.  NEUROLOGIC: Alert, oriented, answers questions appropriately. No tremor. Mentation intact and speech normal  INTEGUMENTARY: No jaundice.  No obvious rash or skin lesions.  PSYCHIATRIC:  Mentation appears normal, affect normal/bright, judgement and insight intact, normal speech and appearance well-groomed.    The rest of a comprehensive physical exam is deferred due to public health emergency video visit restrictions.        LABS:   Latest Reference Range & Units 10/05/22 08:23   Sodium 133 - 144 mmol/L 140   Potassium 3.4 - 5.3 mmol/L 3.9   Chloride 94 - 109 mmol/L 107   Carbon Dioxide 20 - 32 mmol/L 29   Urea Nitrogen 7 - 30 mg/dL 11   Creatinine 0.52 - 1.04 mg/dL 0.72   GFR Estimate >60 mL/min/1.73m2 >90   Calcium 8.5 - 10.1 mg/dL 9.1   Anion Gap 3 - 14 mmol/L 4   Ferritin 12 - 150 ng/mL 29   Iron 35 - 180 ug/dL 186 (H)   Iron Binding Cap 240 - 430 ug/dL 370   Iron Saturation Index 15 - 46 % 50 (H)   Glucose 70 - 99 mg/dL 95   WBC 4.0 - 11.0 10e3/uL 3.7 (L)   Hemoglobin 11.7 - 15.7 g/dL 12.9   Hematocrit 35.0 - 47.0 % 38.6   Platelet Count 150 - 450 10e3/uL 194   RBC Count 3.80 - 5.20 10e6/uL 4.36   MCV 78 - 100 fL 89   MCH 26.5 - 33.0 pg 29.6   MCHC 31.5 - 36.5 g/dL 33.4   RDW 10.0 - 15.0 % 12.6   % Neutrophils % 53   % Lymphocytes % 29   % Monocytes % 10   % Eosinophils % 7   % Basophils % 1    Absolute Basophils 0.0 - 0.2 10e3/uL 0.0   Absolute Eosinophils 0.0 - 0.7 10e3/uL 0.3   Absolute Lymphocytes 0.8 - 5.3 10e3/uL 1.0   Absolute Monocytes 0.0 - 1.3 10e3/uL 0.4   Absolute Neutrophils 1.6 - 8.3 10e3/uL 1.9      Latest Reference Range & Units 10/05/22 08:23   Ferritin 12 - 150 ng/mL 29   Iron 35 - 180 ug/dL 186 (H)   Iron Binding Cap 240 - 430 ug/dL 370   Iron Saturation Index 15 - 46 % 50 (H)         ASSESSMENT/PLAN:  Jessica Mello is a 41 year old female who is referred for elevated iron. Past medical history is significant for depression and chronic fatigue.     1) Elevated iron, iron saturation  -Patient with consistently low normal ferritin.  -Celiac testing in the past has returned negative.  -Calculated TSAT is 50%.  -She has no known history of liver disease or family history of liver disease, hematologic disorder, phlebotomy.  -She is likely developing iron deficiency. She denies excessive menstrual bleed and no gross evidence of GI/ bleed.   -Given elevated TSAT, will test for hemochromatosis given her chronic fatigue.     2) Leukopenia  -Normal diff.  -This is likely due to stimulant therapy.  -Monitor for drug induced neutropenia.    3) Chronic fatigue  -Rheum workup negative in the past.  -Follow with PCP/sleep medicine.    4) Follow up in 4-5 weeks to review HH testing given elevated TSAT. Will then follow every 6 months to monitor for developing anemia. If this is the case, patient should have GI evaluation.           Nory Costello DO  Hematology/Oncology  [facility]          Again, thank you for allowing me to participate in the care of your patient.        Sincerely,        Nory Costello DO

## 2022-10-14 NOTE — PROGRESS NOTES
Jessica is a 41 year old who is being evaluated via a billable video visit.  [place] in [place].    How would you like to obtain your AVS? MyChart  If the video visit is dropped, the invitation should be resent by: Text to cell phone: [phone]  Will anyone else be joining your video visit? No     HONG Esparza      Video-Visit Details    Video Start Time: 8:44 AM    Type of service:  Video Visit    Video End Time:9:15 AM    Originating Location (pt. Location): Home        Distant Location (provider location):  [place]    Platform used for Video Visit: Well          [facility]    Hematology/Oncology New Patient Note      Today's Date: 10/14/22    Reason for Consultation: Elevated iron, fatigue  Referring Provider: Liberty Alvarez MD      HISTORY OF PRESENT ILLNESS: Jessica Mello is a 41 year old female who is referred for elevated iron. Past medical history is significant for depression and chronic fatigue.     She has chronic fatigue. She has to sleep every few hours. She was previously on disability, but now is working part-time as a . She has difficulty doing ADLs due to fatigue. She has had rheumatologic evaluation previously for an elevated KEIRY. She denies history of anemia. She has never been on iron supplementation or OTC vitamins.     Patient has limited labs within our system. Record review shows normal CBC. She was seen by a sleep specialist who ordered iron studies. These have been tested in July and October 2022 showing low normal ferritin stores and elevated iron.     Last year, she reports weight loss of 5-10 lbs, of which she has gained back. No fever, drenching night sweats, LAD. No gross evidence of bleed.    Menses started at the age of 17 yo. Regular, not excessively heavy.     Medication list review: she has been on adderall and sonata for a month. Prozac one year. Atarax just a few times a week.    Family history: Mother diagnosed with  breast cancer, age 65. No family history of CRC. No family history of liver disease/phlebotomy.     Cancer screening: pap smear overdue. Not of age yet for mammograms or screening colonoscopy.        REVIEW OF SYSTEMS:   A 14 point ROS was reviewed with pertinent positives and negatives in the HPI.        HOME MEDICATIONS:  Current Outpatient Medications   Medication Sig Dispense Refill     amphetamine-dextroamphetamine (ADDERALL) 5 MG tablet        FLUoxetine (PROZAC) 20 MG capsule Take 1 capsule (20 mg) by mouth daily 90 capsule 1     fluticasone (FLONASE) 50 MCG/ACT nasal spray Spray 1 spray into both nostrils daily 16 g 5     hydrOXYzine (ATARAX) 25 MG tablet Take ONE TO TWO tablets (25-50 mg) by mouth THREE times daily as needed for sleep/anxiety 20 tablet 0     VITAMIN D PO Take 2,000 Int'l Units by mouth daily       zaleplon (SONATA) 10 MG capsule Take 10 mg by mouth       traZODone (DESYREL) 50 MG tablet  (Patient not taking: Reported on 10/14/2022)           ALLERGIES:  Allergies   Allergen Reactions     Bupropion Unknown     Modafinil Unknown     Penicillins Hives         PAST MEDICAL HISTORY:  Past Medical History:   Diagnosis Date     Depressive disorder 2020         PAST SURGICAL HISTORY:  Past Surgical History:   Procedure Laterality Date     NO HISTORY OF SURGERY           SOCIAL HISTORY:  Social History     Socioeconomic History     Marital status: Single     Spouse name: Not on file     Number of children: Not on file     Years of education: Not on file     Highest education level: Not on file   Occupational History     Not on file   Tobacco Use     Smoking status: Former     Packs/day: 0.10     Years: 20.00     Pack years: 2.00     Types: Cigarettes     Start date: 1998     Quit date: 2022     Years since quittin.3     Smokeless tobacco: Never     Tobacco comments:     used to promote wakefulness   Vaping Use     Vaping Use: Never used   Substance and Sexual Activity     Alcohol use:  Yes     Comment: 3-4 drinks per week.     Drug use: Yes     Types: Marijuana     Comment: intermittent use for pain/sleep- few times a month     Sexual activity: Not Currently     Birth control/protection: None   Other Topics Concern     Parent/sibling w/ CABG, MI or angioplasty before 65F 55M? No   Social History Narrative     Not on file     Social Determinants of Health     Financial Resource Strain: Low Risk      Difficulty of Paying Living Expenses: Not very hard   Food Insecurity: No Food Insecurity     Worried About Running Out of Food in the Last Year: Never true     Ran Out of Food in the Last Year: Never true   Transportation Needs: No Transportation Needs     Lack of Transportation (Medical): No     Lack of Transportation (Non-Medical): No   Physical Activity: Insufficiently Active     Days of Exercise per Week: 2 days     Minutes of Exercise per Session: 20 min   Stress: Stress Concern Present     Feeling of Stress : Very much   Social Connections: Socially Isolated     Frequency of Communication with Friends and Family: Once a week     Frequency of Social Gatherings with Friends and Family: Once a week     Attends Confucianism Services: Never     Active Member of Clubs or Organizations: Yes     Attends Club or Organization Meetings: Not on file     Marital Status: Never    Intimate Partner Violence: Not on file   Housing Stability: Low Risk      Unable to Pay for Housing in the Last Year: No     Number of Places Lived in the Last Year: 2     Unstable Housing in the Last Year: No         FAMILY HISTORY:  Family History   Problem Relation Age of Onset     Hypertension Mother      Breast Cancer Mother 65     Hyperlipidemia Father      Depression Sister      Anxiety Disorder Sister      Thyroid Disease Sister      Narcolepsy Sister      Thyroid Disease Maternal Grandmother      Depression Sister      Attention Deficit Disorder Sister      Colon Cancer Paternal Uncle         dx after age 50          PHYSICAL EXAM:  ECO  GENERAL/CONSTITUTIONAL: No acute distress. Healthy, alert. Fatigued, tearful.  EYES: No scleral icterus.  No redness or discharge.    RESPIRATORY: No audible wheeze, cough, or visible cyanosis.  No visible retractions or increased work of breathing.  Able to speak fully in complete sentences.  MUSCULOSKELETAL: Normal range of motion.  NEUROLOGIC: Alert, oriented, answers questions appropriately. No tremor. Mentation intact and speech normal  INTEGUMENTARY: No jaundice.  No obvious rash or skin lesions.  PSYCHIATRIC:  Mentation appears normal, affect normal/bright, judgement and insight intact, normal speech and appearance well-groomed.    The rest of a comprehensive physical exam is deferred due to public health emergency video visit restrictions.        LABS:   Latest Reference Range & Units 10/05/22 08:23   Sodium 133 - 144 mmol/L 140   Potassium 3.4 - 5.3 mmol/L 3.9   Chloride 94 - 109 mmol/L 107   Carbon Dioxide 20 - 32 mmol/L 29   Urea Nitrogen 7 - 30 mg/dL 11   Creatinine 0.52 - 1.04 mg/dL 0.72   GFR Estimate >60 mL/min/1.73m2 >90   Calcium 8.5 - 10.1 mg/dL 9.1   Anion Gap 3 - 14 mmol/L 4   Ferritin 12 - 150 ng/mL 29   Iron 35 - 180 ug/dL 186 (H)   Iron Binding Cap 240 - 430 ug/dL 370   Iron Saturation Index 15 - 46 % 50 (H)   Glucose 70 - 99 mg/dL 95   WBC 4.0 - 11.0 10e3/uL 3.7 (L)   Hemoglobin 11.7 - 15.7 g/dL 12.9   Hematocrit 35.0 - 47.0 % 38.6   Platelet Count 150 - 450 10e3/uL 194   RBC Count 3.80 - 5.20 10e6/uL 4.36   MCV 78 - 100 fL 89   MCH 26.5 - 33.0 pg 29.6   MCHC 31.5 - 36.5 g/dL 33.4   RDW 10.0 - 15.0 % 12.6   % Neutrophils % 53   % Lymphocytes % 29   % Monocytes % 10   % Eosinophils % 7   % Basophils % 1   Absolute Basophils 0.0 - 0.2 10e3/uL 0.0   Absolute Eosinophils 0.0 - 0.7 10e3/uL 0.3   Absolute Lymphocytes 0.8 - 5.3 10e3/uL 1.0   Absolute Monocytes 0.0 - 1.3 10e3/uL 0.4   Absolute Neutrophils 1.6 - 8.3 10e3/uL 1.9      Latest Reference Range & Units  10/05/22 08:23   Ferritin 12 - 150 ng/mL 29   Iron 35 - 180 ug/dL 186 (H)   Iron Binding Cap 240 - 430 ug/dL 370   Iron Saturation Index 15 - 46 % 50 (H)         ASSESSMENT/PLAN:  Jessica Mello is a 41 year old female who is referred for elevated iron. Past medical history is significant for depression and chronic fatigue.     1) Elevated iron, iron saturation  -Patient with consistently low normal ferritin.  -Celiac testing in the past has returned negative.  -Calculated TSAT is 50%.  -She has no known history of liver disease or family history of liver disease, hematologic disorder, phlebotomy.  -She is likely developing iron deficiency. She denies excessive menstrual bleed and no gross evidence of GI/ bleed.   -Given elevated TSAT, will test for hemochromatosis given her chronic fatigue.     2) Leukopenia  -Normal diff.  -This is likely due to stimulant therapy.  -Monitor for drug induced neutropenia.    3) Chronic fatigue  -Rheum workup negative in the past.  -Follow with PCP/sleep medicine.    4) Follow up in 4-5 weeks to review HH testing given elevated TSAT. Will then follow every 6 months to monitor for developing anemia. If this is the case, patient should have GI evaluation.           Nory Costello DO  Hematology/Oncology  [facility]

## 2022-10-14 NOTE — LETTER
10/14/2022         RE: Jessica Mello  [address]  [place] [address]        Dear Colleague,    Thank you for referring your patient, Jessica Mello, to the [facility]. Please see a copy of my visit note below.    Jessica is a 41 year old who is being evaluated via a billable video visit.  [place].    How would you like to obtain your AVS? MyChart  If the video visit is dropped, the invitation should be resent by: Text to cell phone: [phone]  Will anyone else be joining your video visit? No     HONG Esparza      Video-Visit Details    Video Start Time: 8:44 AM    Type of service:  Video Visit    Video End Time:9:15 AM    Originating Location (pt. Location): Home        Distant Location (provider location):  [employer]    Platform used for Video Visit: [facility]    Hematology/Oncology New Patient Note      Today's Date: 10/14/22    Reason for Consultation: Elevated iron, fatigue  Referring Provider: Liberty Alvarez MD      HISTORY OF PRESENT ILLNESS: Jessica Mello is a 41 year old female who is referred for elevated iron. Past medical history is significant for depression and chronic fatigue.     She has chronic fatigue. She has to sleep every few hours. She was previously on disability, but now is working part-time as a . She has difficulty doing ADLs due to fatigue. She has had rheumatologic evaluation previously for an elevated KEIRY. She denies history of anemia. She has never been on iron supplementation or OTC vitamins.     Patient has limited labs within our system. Record review shows normal CBC. She was seen by a sleep specialist who ordered iron studies. These have been tested in July and October 2022 showing low normal ferritin stores and elevated iron.     Last year, she reports weight loss of 5-10 lbs, of which she has gained back. No fever, drenching night sweats,  LAD. No gross evidence of bleed.    Menses started at the age of 17 yo. Regular, not excessively heavy.     Medication list review: she has been on adderall and sonata for a month. Prozac one year. Atarax just a few times a week.    Family history: Mother diagnosed with breast cancer, age 65. No family history of CRC. No family history of liver disease/phlebotomy.     Cancer screening: pap smear overdue. Not of age yet for mammograms or screening colonoscopy.        REVIEW OF SYSTEMS:   A 14 point ROS was reviewed with pertinent positives and negatives in the HPI.        HOME MEDICATIONS:  Current Outpatient Medications   Medication Sig Dispense Refill     amphetamine-dextroamphetamine (ADDERALL) 5 MG tablet        FLUoxetine (PROZAC) 20 MG capsule Take 1 capsule (20 mg) by mouth daily 90 capsule 1     fluticasone (FLONASE) 50 MCG/ACT nasal spray Spray 1 spray into both nostrils daily 16 g 5     hydrOXYzine (ATARAX) 25 MG tablet Take ONE TO TWO tablets (25-50 mg) by mouth THREE times daily as needed for sleep/anxiety 20 tablet 0     VITAMIN D PO Take 2,000 Int'l Units by mouth daily       zaleplon (SONATA) 10 MG capsule Take 10 mg by mouth       traZODone (DESYREL) 50 MG tablet  (Patient not taking: Reported on 10/14/2022)           ALLERGIES:  Allergies   Allergen Reactions     Bupropion Unknown     Modafinil Unknown     Penicillins Hives         PAST MEDICAL HISTORY:  Past Medical History:   Diagnosis Date     Depressive disorder 6/2020         PAST SURGICAL HISTORY:  Past Surgical History:   Procedure Laterality Date     NO HISTORY OF SURGERY           SOCIAL HISTORY:  Social History     Socioeconomic History     Marital status: Single     Spouse name: Not on file     Number of children: Not on file     Years of education: Not on file     Highest education level: Not on file   Occupational History     Not on file   Tobacco Use     Smoking status: Former     Packs/day: 0.10     Years: 20.00     Pack years: 2.00      Types: Cigarettes     Start date: 1998     Quit date: 2022     Years since quittin.3     Smokeless tobacco: Never     Tobacco comments:     used to promote wakefulness   Vaping Use     Vaping Use: Never used   Substance and Sexual Activity     Alcohol use: Yes     Comment: 3-4 drinks per week.     Drug use: Yes     Types: Marijuana     Comment: intermittent use for pain/sleep- few times a month     Sexual activity: Not Currently     Birth control/protection: None   Other Topics Concern     Parent/sibling w/ CABG, MI or angioplasty before 65F 55M? No   Social History Narrative     Not on file     Social Determinants of Health     Financial Resource Strain: Low Risk      Difficulty of Paying Living Expenses: Not very hard   Food Insecurity: No Food Insecurity     Worried About Running Out of Food in the Last Year: Never true     Ran Out of Food in the Last Year: Never true   Transportation Needs: No Transportation Needs     Lack of Transportation (Medical): No     Lack of Transportation (Non-Medical): No   Physical Activity: Insufficiently Active     Days of Exercise per Week: 2 days     Minutes of Exercise per Session: 20 min   Stress: Stress Concern Present     Feeling of Stress : Very much   Social Connections: Socially Isolated     Frequency of Communication with Friends and Family: Once a week     Frequency of Social Gatherings with Friends and Family: Once a week     Attends Islam Services: Never     Active Member of Clubs or Organizations: Yes     Attends Club or Organization Meetings: Not on file     Marital Status: Never    Intimate Partner Violence: Not on file   Housing Stability: Low Risk      Unable to Pay for Housing in the Last Year: No     Number of Places Lived in the Last Year: 2     Unstable Housing in the Last Year: No         FAMILY HISTORY:  Family History   Problem Relation Age of Onset     Hypertension Mother      Breast Cancer Mother 65     Hyperlipidemia Father       Depression Sister      Anxiety Disorder Sister      Thyroid Disease Sister      Narcolepsy Sister      Thyroid Disease Maternal Grandmother      Depression Sister      Attention Deficit Disorder Sister      Colon Cancer Paternal Uncle         dx after age 50         PHYSICAL EXAM:  ECO  GENERAL/CONSTITUTIONAL: No acute distress. Healthy, alert. Fatigued, tearful.  EYES: No scleral icterus.  No redness or discharge.    RESPIRATORY: No audible wheeze, cough, or visible cyanosis.  No visible retractions or increased work of breathing.  Able to speak fully in complete sentences.  MUSCULOSKELETAL: Normal range of motion.  NEUROLOGIC: Alert, oriented, answers questions appropriately. No tremor. Mentation intact and speech normal  INTEGUMENTARY: No jaundice.  No obvious rash or skin lesions.  PSYCHIATRIC:  Mentation appears normal, affect normal/bright, judgement and insight intact, normal speech and appearance well-groomed.    The rest of a comprehensive physical exam is deferred due to public health emergency video visit restrictions.        LABS:   Latest Reference Range & Units 10/05/22 08:23   Sodium 133 - 144 mmol/L 140   Potassium 3.4 - 5.3 mmol/L 3.9   Chloride 94 - 109 mmol/L 107   Carbon Dioxide 20 - 32 mmol/L 29   Urea Nitrogen 7 - 30 mg/dL 11   Creatinine 0.52 - 1.04 mg/dL 0.72   GFR Estimate >60 mL/min/1.73m2 >90   Calcium 8.5 - 10.1 mg/dL 9.1   Anion Gap 3 - 14 mmol/L 4   Ferritin 12 - 150 ng/mL 29   Iron 35 - 180 ug/dL 186 (H)   Iron Binding Cap 240 - 430 ug/dL 370   Iron Saturation Index 15 - 46 % 50 (H)   Glucose 70 - 99 mg/dL 95   WBC 4.0 - 11.0 10e3/uL 3.7 (L)   Hemoglobin 11.7 - 15.7 g/dL 12.9   Hematocrit 35.0 - 47.0 % 38.6   Platelet Count 150 - 450 10e3/uL 194   RBC Count 3.80 - 5.20 10e6/uL 4.36   MCV 78 - 100 fL 89   MCH 26.5 - 33.0 pg 29.6   MCHC 31.5 - 36.5 g/dL 33.4   RDW 10.0 - 15.0 % 12.6   % Neutrophils % 53   % Lymphocytes % 29   % Monocytes % 10   % Eosinophils % 7   % Basophils % 1    Absolute Basophils 0.0 - 0.2 10e3/uL 0.0   Absolute Eosinophils 0.0 - 0.7 10e3/uL 0.3   Absolute Lymphocytes 0.8 - 5.3 10e3/uL 1.0   Absolute Monocytes 0.0 - 1.3 10e3/uL 0.4   Absolute Neutrophils 1.6 - 8.3 10e3/uL 1.9      Latest Reference Range & Units 10/05/22 08:23   Ferritin 12 - 150 ng/mL 29   Iron 35 - 180 ug/dL 186 (H)   Iron Binding Cap 240 - 430 ug/dL 370   Iron Saturation Index 15 - 46 % 50 (H)         ASSESSMENT/PLAN:  Jessica Mello is a 41 year old female who is referred for elevated iron. Past medical history is significant for depression and chronic fatigue.     1) Elevated iron, iron saturation  -Patient with consistently low normal ferritin.  -Celiac testing in the past has returned negative.  -Calculated TSAT is 50%.  -She has no known history of liver disease or family history of liver disease, hematologic disorder, phlebotomy.  -She is likely developing iron deficiency. She denies excessive menstrual bleed and no gross evidence of GI/ bleed.   -Given elevated TSAT, will test for hemochromatosis given her chronic fatigue.     2) Leukopenia  -Normal diff.  -This is likely due to stimulant therapy.  -Monitor for drug induced neutropenia.    3) Chronic fatigue  -Rheum workup negative in the past.  -Follow with PCP/sleep medicine.    4) Follow up in 4-5 weeks to review HH testing given elevated TSAT. Will then follow every 6 months to monitor for developing anemia. If this is the case, patient should have GI evaluation.           Nory Costello DO  Hematology/Oncology  [facility]          Again, thank you for allowing me to participate in the care of your patient.        Sincerely,        Nory Costello DO

## 2022-10-19 ENCOUNTER — LAB (OUTPATIENT)
Dept: LAB | Facility: CLINIC | Age: 42
End: 2022-10-19
Payer: COMMERCIAL

## 2022-10-19 DIAGNOSIS — R79.0 RAISED SERUM IRON: ICD-10-CM

## 2022-10-19 DIAGNOSIS — R79.89 HIGH SERUM TRANSFERRIN SATURATION: ICD-10-CM

## 2022-10-19 DIAGNOSIS — R53.83 FATIGUE, UNSPECIFIED TYPE: ICD-10-CM

## 2022-10-19 DIAGNOSIS — D72.819 LEUKOPENIA, UNSPECIFIED TYPE: ICD-10-CM

## 2022-10-19 LAB
ALBUMIN SERPL-MCNC: 4.3 G/DL (ref 3.4–5)
ALP SERPL-CCNC: 66 U/L (ref 40–150)
ALT SERPL W P-5'-P-CCNC: 21 U/L (ref 0–50)
ANION GAP SERPL CALCULATED.3IONS-SCNC: 4 MMOL/L (ref 3–14)
AST SERPL W P-5'-P-CCNC: 19 U/L (ref 0–45)
BASOPHILS # BLD AUTO: 0.1 10E3/UL (ref 0–0.2)
BASOPHILS NFR BLD AUTO: 1 %
BILIRUB SERPL-MCNC: 0.4 MG/DL (ref 0.2–1.3)
BUN SERPL-MCNC: 6 MG/DL (ref 7–30)
CALCIUM SERPL-MCNC: 8.8 MG/DL (ref 8.5–10.1)
CHLORIDE BLD-SCNC: 107 MMOL/L (ref 94–109)
CO2 SERPL-SCNC: 29 MMOL/L (ref 20–32)
CREAT SERPL-MCNC: 0.73 MG/DL (ref 0.52–1.04)
EOSINOPHIL # BLD AUTO: 0.1 10E3/UL (ref 0–0.7)
EOSINOPHIL NFR BLD AUTO: 3 %
ERYTHROCYTE [DISTWIDTH] IN BLOOD BY AUTOMATED COUNT: 12.1 % (ref 10–15)
GFR SERPL CREATININE-BSD FRML MDRD: >90 ML/MIN/1.73M2
GLUCOSE BLD-MCNC: 90 MG/DL (ref 70–99)
HCT VFR BLD AUTO: 36.7 % (ref 35–47)
HGB BLD-MCNC: 12.2 G/DL (ref 11.7–15.7)
IMM GRANULOCYTES # BLD: 0 10E3/UL
IMM GRANULOCYTES NFR BLD: 0 %
LAB DIRECTOR COMMENTS: NORMAL
LAB DIRECTOR DISCLAIMER: NORMAL
LAB DIRECTOR INTERPRETATION: NORMAL
LAB DIRECTOR METHODOLOGY: NORMAL
LAB DIRECTOR RESULTS: NORMAL
LYMPHOCYTES # BLD AUTO: 1.1 10E3/UL (ref 0.8–5.3)
LYMPHOCYTES NFR BLD AUTO: 25 %
MCH RBC QN AUTO: 28.8 PG (ref 26.5–33)
MCHC RBC AUTO-ENTMCNC: 33.2 G/DL (ref 31.5–36.5)
MCV RBC AUTO: 87 FL (ref 78–100)
MONOCYTES # BLD AUTO: 0.3 10E3/UL (ref 0–1.3)
MONOCYTES NFR BLD AUTO: 6 %
NEUTROPHILS # BLD AUTO: 2.9 10E3/UL (ref 1.6–8.3)
NEUTROPHILS NFR BLD AUTO: 65 %
PLATELET # BLD AUTO: 255 10E3/UL (ref 150–450)
POTASSIUM BLD-SCNC: 3.8 MMOL/L (ref 3.4–5.3)
PROT SERPL-MCNC: 7.4 G/DL (ref 6.8–8.8)
RBC # BLD AUTO: 4.24 10E6/UL (ref 3.8–5.2)
RETICS # AUTO: 0.05 10E6/UL (ref 0.03–0.1)
RETICS/RBC NFR AUTO: 1.3 % (ref 0.5–2)
SODIUM SERPL-SCNC: 140 MMOL/L (ref 133–144)
SPECIMEN DESCRIPTION: NORMAL
WBC # BLD AUTO: 4.4 10E3/UL (ref 4–11)

## 2022-10-19 PROCEDURE — 85045 AUTOMATED RETICULOCYTE COUNT: CPT

## 2022-10-19 PROCEDURE — 85060 BLOOD SMEAR INTERPRETATION: CPT | Performed by: PATHOLOGY

## 2022-10-19 PROCEDURE — G0452 MOLECULAR PATHOLOGY INTERPR: HCPCS | Mod: 59 | Performed by: STUDENT IN AN ORGANIZED HEALTH CARE EDUCATION/TRAINING PROGRAM

## 2022-10-19 PROCEDURE — 36415 COLL VENOUS BLD VENIPUNCTURE: CPT

## 2022-10-19 PROCEDURE — 80053 COMPREHEN METABOLIC PANEL: CPT

## 2022-10-19 PROCEDURE — 85025 COMPLETE CBC W/AUTO DIFF WBC: CPT

## 2022-10-19 PROCEDURE — 81256 HFE GENE: CPT

## 2022-10-20 LAB
PATH REPORT.COMMENTS IMP SPEC: NORMAL
PATH REPORT.FINAL DX SPEC: NORMAL
PATH REPORT.MICROSCOPIC SPEC OTHER STN: NORMAL
PATH REPORT.MICROSCOPIC SPEC OTHER STN: NORMAL
PATH REPORT.RELEVANT HX SPEC: NORMAL

## 2022-11-09 ENCOUNTER — TELEPHONE (OUTPATIENT)
Dept: PULMONOLOGY | Facility: OTHER | Age: 42
End: 2022-11-09

## 2022-11-09 NOTE — TELEPHONE ENCOUNTER
Received a letter from [employer] about effects of Jessica's  Medication sent to Dr Boothe  And to be scaned

## 2022-11-14 NOTE — PROGRESS NOTES
Start time: 8:46 AM  End time: 9:10 AM  AmWell. Patient in her own home.    [facility]    Hematology/Oncology Established Patient Note      Today's Date: 11/15/22    Reason for Consultation: Elevated iron, fatigue  Referring Provider: Liberty Alvarez MD      HISTORY OF PRESENT ILLNESS: Jessica Mello is a 41 year old female who is referred for elevated iron. Past medical history is significant for depression and chronic fatigue.     She has chronic fatigue. She has to sleep every few hours. She was previously on disability, but now is working part-time as a . She has difficulty doing ADLs due to fatigue. She has had rheumatologic evaluation previously for an elevated KEIRY. She denies history of anemia. She has never been on iron supplementation or OTC vitamins.     Patient has limited labs within our system. Record review shows normal CBC. She was seen by a sleep specialist who ordered iron studies. These have been tested in July and October 2022 showing low normal ferritin stores and elevated iron.     Last year, she reports weight loss of 5-10 lbs, of which she has gained back. No fever, drenching night sweats, LAD. No gross evidence of bleed.    Menses started at the age of 17 yo. Regular, not excessively heavy.     Medication list review: she has been on adderall and sonata for a month. Prozac one year. Atarax just a few times a week.    Family history: Mother diagnosed with breast cancer, age 65. No family history of CRC. No family history of liver disease/phlebotomy.     Cancer screening: pap smear overdue. Not of age yet for mammograms or screening colonoscopy.      INTERIM HISTORY:  Energy levels are stable. Medication changes include: increased adderall use, stopped duloxetine, started mirtazapine.     No unintentional weight loss, drenching night sweats, LAD, fever.       REVIEW OF SYSTEMS:   A 14 point ROS was reviewed with pertinent positives and  negatives in the HPI.        HOME MEDICATIONS:  Current Outpatient Medications   Medication Sig Dispense Refill     amphetamine-dextroamphetamine (ADDERALL) 5 MG tablet        fluticasone (FLONASE) 50 MCG/ACT nasal spray Spray 1 spray into both nostrils daily 16 g 5     mirtazapine (REMERON) 7.5 MG tablet Take 7.5 mg by mouth       FLUoxetine (PROZAC) 20 MG capsule Take 1 capsule (20 mg) by mouth daily (Patient not taking: Reported on 11/15/2022) 90 capsule 1     hydrOXYzine (ATARAX) 25 MG tablet Take ONE TO TWO tablets (25-50 mg) by mouth THREE times daily as needed for sleep/anxiety (Patient not taking: Reported on 11/15/2022) 20 tablet 0     traZODone (DESYREL) 50 MG tablet  (Patient not taking: Reported on 10/14/2022)       VITAMIN D PO Take 2,000 Int'l Units by mouth daily (Patient not taking: Reported on 11/15/2022)       zaleplon (SONATA) 10 MG capsule Take 10 mg by mouth           ALLERGIES:  Allergies   Allergen Reactions     Bupropion Unknown     Modafinil Unknown     Penicillins Hives         PAST MEDICAL HISTORY:  Past Medical History:   Diagnosis Date     Depressive disorder 2020         PAST SURGICAL HISTORY:  Past Surgical History:   Procedure Laterality Date     NO HISTORY OF SURGERY           SOCIAL HISTORY:  Social History     Socioeconomic History     Marital status: Single     Spouse name: Not on file     Number of children: Not on file     Years of education: Not on file     Highest education level: Not on file   Occupational History     Not on file   Tobacco Use     Smoking status: Former     Packs/day: 0.10     Years: 20.00     Pack years: 2.00     Types: Cigarettes     Start date: 1998     Quit date: 2022     Years since quittin.4     Smokeless tobacco: Never     Tobacco comments:     used to promote wakefulness   Vaping Use     Vaping Use: Never used   Substance and Sexual Activity     Alcohol use: Yes     Comment: 3-4 drinks per week.     Drug use: Yes     Types: Marijuana      Comment: intermittent use for pain/sleep- few times a month     Sexual activity: Not Currently     Birth control/protection: None   Other Topics Concern     Parent/sibling w/ CABG, MI or angioplasty before 65F 55M? No   Social History Narrative     Not on file     Social Determinants of Health     Financial Resource Strain: Low Risk      Difficulty of Paying Living Expenses: Not very hard   Food Insecurity: No Food Insecurity     Worried About Running Out of Food in the Last Year: Never true     Ran Out of Food in the Last Year: Never true   Transportation Needs: No Transportation Needs     Lack of Transportation (Medical): No     Lack of Transportation (Non-Medical): No   Physical Activity: Insufficiently Active     Days of Exercise per Week: 2 days     Minutes of Exercise per Session: 20 min   Stress: Stress Concern Present     Feeling of Stress : Very much   Social Connections: Socially Isolated     Frequency of Communication with Friends and Family: Once a week     Frequency of Social Gatherings with Friends and Family: Once a week     Attends Hindu Services: Never     Active Member of Clubs or Organizations: Yes     Attends Club or Organization Meetings: Not on file     Marital Status: Never    Intimate Partner Violence: Not on file   Housing Stability: Low Risk      Unable to Pay for Housing in the Last Year: No     Number of Places Lived in the Last Year: 2     Unstable Housing in the Last Year: No         FAMILY HISTORY:  Family History   Problem Relation Age of Onset     Hypertension Mother      Breast Cancer Mother 65     Hyperlipidemia Father      Depression Sister      Anxiety Disorder Sister      Thyroid Disease Sister      Narcolepsy Sister      Thyroid Disease Maternal Grandmother      Depression Sister      Attention Deficit Disorder Sister      Colon Cancer Paternal Uncle         dx after age 50         PHYSICAL EXAM:  ECO  GENERAL/CONSTITUTIONAL: No acute distress. Healthy, alert.  Fatigued, tearful.  EYES: No scleral icterus.  No redness or discharge.    RESPIRATORY: No audible wheeze, cough, or visible cyanosis.  No visible retractions or increased work of breathing.  Able to speak fully in complete sentences.  MUSCULOSKELETAL: Normal range of motion.  NEUROLOGIC: Alert, oriented, answers questions appropriately. No tremor. Mentation intact and speech normal  INTEGUMENTARY: No jaundice.  No obvious rash or skin lesions.  PSYCHIATRIC:  Mentation appears normal, affect normal/bright, judgement and insight intact, normal speech and appearance well-groomed.    The rest of a comprehensive physical exam is deferred due to public health emergency video visit restrictions.        LABS:   Latest Reference Range & Units 10/19/22 13:29   Sodium 133 - 144 mmol/L 140   Potassium 3.4 - 5.3 mmol/L 3.8   Chloride 94 - 109 mmol/L 107   Carbon Dioxide 20 - 32 mmol/L 29   Urea Nitrogen 7 - 30 mg/dL 6 (L)   Creatinine 0.52 - 1.04 mg/dL 0.73   GFR Estimate >60 mL/min/1.73m2 >90   Calcium 8.5 - 10.1 mg/dL 8.8   Anion Gap 3 - 14 mmol/L 4   Albumin 3.4 - 5.0 g/dL 4.3   Protein Total 6.8 - 8.8 g/dL 7.4   Alkaline Phosphatase 40 - 150 U/L 66   ALT 0 - 50 U/L 21   AST 0 - 45 U/L 19   Bilirubin Total 0.2 - 1.3 mg/dL 0.4   Glucose 70 - 99 mg/dL 90   WBC 4.0 - 11.0 10e3/uL 4.4   Hemoglobin 11.7 - 15.7 g/dL 12.2   Hematocrit 35.0 - 47.0 % 36.7   Platelet Count 150 - 450 10e3/uL 255   RBC Count 3.80 - 5.20 10e6/uL 4.24   MCV 78 - 100 fL 87   MCH 26.5 - 33.0 pg 28.8   MCHC 31.5 - 36.5 g/dL 33.2   RDW 10.0 - 15.0 % 12.1   % Neutrophils % 65   % Lymphocytes % 25   % Monocytes % 6   % Eosinophils % 3   % Basophils % 1   Absolute Basophils 0.0 - 0.2 10e3/uL 0.1   Absolute Eosinophils 0.0 - 0.7 10e3/uL 0.1   Absolute Immature Granulocytes <=0.4 10e3/uL 0.0   Absolute Lymphocytes 0.8 - 5.3 10e3/uL 1.1   Absolute Monocytes 0.0 - 1.3 10e3/uL 0.3   % Immature Granulocytes % 0   Absolute Neutrophils 1.6 - 8.3 10e3/uL 2.9   % Retic  0.5 - 2.0 % 1.3   Absolute Retic 0.025 - 0.095 10e6/uL 0.054        Latest Reference Range & Units 10/05/22 08:23   Ferritin 12 - 150 ng/mL 29   Iron 35 - 180 ug/dL 186 (H)   Iron Binding Cap 240 - 430 ug/dL 370   Iron Saturation Index 15 - 46 % 50 (H)         PATHOLOGY:  RESULTS    HEMOCHROMATOSIS RESULTS     HFE Gene C282Y (G845A) RESULTS:     C282Y Mutation Interpretation: NORMAL     HFE Gene H63D (C187G) RESULTS:     H63D Mutation Interpretation: NORMAL     HFE Gene S65C (A193T) RESULTS:     S65C Mutation Interpretation: NORMAL   INTERPRETATION    This patient does not carry the C282Y, the H63D or the S65C mutations in the HFE gene. The absence of these mutations, particularly in non-Caucasians, does not rule out the presence of hemochromatosis.         ASSESSMENT/PLAN:  Jessica Mello is a 41 year old female who is referred for elevated iron. Past medical history is significant for depression and chronic fatigue.     1) Elevated iron, iron saturation  -Patient with consistently low normal ferritin.  -Celiac testing in the past has returned negative.  -Calculated TSAT is 50%.  -She has no known history of liver disease or family history of liver disease, hematologic disorder, phlebotomy.  -She is likely developing iron deficiency. She denies excessive menstrual bleed and no gross evidence of GI/ bleed.   -Hemochromatosis testing returned negative (reviewed negative C282Y, H63D, S65C).   -Patient is to have repeat labs tomorrow with sleep study team and will follow up on results of this. Discussed possible expanded HH testing for more rare mutations and MRI liver.     2) Leukopenia, resolved  -Normal diff.  -This is likely due to stimulant therapy.  -Monitor for drug induced neutropenia.    3) Chronic fatigue  -Rheum workup negative in the past.  -Follow with PCP/sleep medicine.    4) Follow up with labs results by end of week and then will contact patient in regards to above. Otherwise, will follow  every 6-12 months to monitor for developing anemia. If this is the case, patient should have GI evaluation.           Nory Costello DO  Hematology/Oncology  [facility]

## 2022-11-15 ENCOUNTER — VIRTUAL VISIT (OUTPATIENT)
Dept: ONCOLOGY | Facility: CLINIC | Age: 42
End: 2022-11-15
Attending: INTERNAL MEDICINE
Payer: COMMERCIAL

## 2022-11-15 DIAGNOSIS — R79.89 HIGH SERUM TRANSFERRIN SATURATION: ICD-10-CM

## 2022-11-15 DIAGNOSIS — R79.0 RAISED SERUM IRON: ICD-10-CM

## 2022-11-15 PROCEDURE — 99214 OFFICE O/P EST MOD 30 MIN: CPT | Mod: GT | Performed by: INTERNAL MEDICINE

## 2022-11-15 RX ORDER — MIRTAZAPINE 7.5 MG/1
7.5 TABLET, FILM COATED ORAL
COMMUNITY
Start: 2022-11-05 | End: 2023-02-08

## 2022-11-15 NOTE — LETTER
11/15/2022         RE: Jessica Mello  [address]  [place] [address]        Dear Colleague,    Thank you for referring your patient, Jessica Mello, to the [facility]. Please see a copy of my visit note below.    Start time: 8:46 AM  End time: 9:10 AM  AmWell. Patient in her own home.    [facility]    Hematology/Oncology Established Patient Note      Today's Date: 11/15/22    Reason for Consultation: Elevated iron, fatigue  Referring Provider: Liberty Alvarez MD      HISTORY OF PRESENT ILLNESS: Jessica Mello is a 41 year old female who is referred for elevated iron. Past medical history is significant for depression and chronic fatigue.     She has chronic fatigue. She has to sleep every few hours. She was previously on disability, but now is working part-time as a . She has difficulty doing ADLs due to fatigue. She has had rheumatologic evaluation previously for an elevated KEIRY. She denies history of anemia. She has never been on iron supplementation or OTC vitamins.     Patient has limited labs within our system. Record review shows normal CBC. She was seen by a sleep specialist who ordered iron studies. These have been tested in July and October 2022 showing low normal ferritin stores and elevated iron.     Last year, she reports weight loss of 5-10 lbs, of which she has gained back. No fever, drenching night sweats, LAD. No gross evidence of bleed.    Menses started at the age of 17 yo. Regular, not excessively heavy.     Medication list review: she has been on adderall and sonata for a month. Prozac one year. Atarax just a few times a week.    Family history: Mother diagnosed with breast cancer, age 65. No family history of CRC. No family history of liver disease/phlebotomy.     Cancer screening: pap smear overdue. Not of age yet for mammograms or screening colonoscopy.      INTERIM HISTORY:  Energy  levels are stable. Medication changes include: increased adderall use, stopped duloxetine, started mirtazapine.     No unintentional weight loss, drenching night sweats, LAD, fever.       REVIEW OF SYSTEMS:   A 14 point ROS was reviewed with pertinent positives and negatives in the HPI.        HOME MEDICATIONS:  Current Outpatient Medications   Medication Sig Dispense Refill     amphetamine-dextroamphetamine (ADDERALL) 5 MG tablet        fluticasone (FLONASE) 50 MCG/ACT nasal spray Spray 1 spray into both nostrils daily 16 g 5     mirtazapine (REMERON) 7.5 MG tablet Take 7.5 mg by mouth       FLUoxetine (PROZAC) 20 MG capsule Take 1 capsule (20 mg) by mouth daily (Patient not taking: Reported on 11/15/2022) 90 capsule 1     hydrOXYzine (ATARAX) 25 MG tablet Take ONE TO TWO tablets (25-50 mg) by mouth THREE times daily as needed for sleep/anxiety (Patient not taking: Reported on 11/15/2022) 20 tablet 0     traZODone (DESYREL) 50 MG tablet  (Patient not taking: Reported on 10/14/2022)       VITAMIN D PO Take 2,000 Int'l Units by mouth daily (Patient not taking: Reported on 11/15/2022)       zaleplon (SONATA) 10 MG capsule Take 10 mg by mouth           ALLERGIES:  Allergies   Allergen Reactions     Bupropion Unknown     Modafinil Unknown     Penicillins Hives         PAST MEDICAL HISTORY:  Past Medical History:   Diagnosis Date     Depressive disorder 6/2020         PAST SURGICAL HISTORY:  Past Surgical History:   Procedure Laterality Date     NO HISTORY OF SURGERY           SOCIAL HISTORY:  Social History     Socioeconomic History     Marital status: Single     Spouse name: Not on file     Number of children: Not on file     Years of education: Not on file     Highest education level: Not on file   Occupational History     Not on file   Tobacco Use     Smoking status: Former     Packs/day: 0.10     Years: 20.00     Pack years: 2.00     Types: Cigarettes     Start date: 6/2/1998     Quit date: 6/13/2022     Years since  quittin.4     Smokeless tobacco: Never     Tobacco comments:     used to promote wakefulness   Vaping Use     Vaping Use: Never used   Substance and Sexual Activity     Alcohol use: Yes     Comment: 3-4 drinks per week.     Drug use: Yes     Types: Marijuana     Comment: intermittent use for pain/sleep- few times a month     Sexual activity: Not Currently     Birth control/protection: None   Other Topics Concern     Parent/sibling w/ CABG, MI or angioplasty before 65F 55M? No   Social History Narrative     Not on file     Social Determinants of Health     Financial Resource Strain: Low Risk      Difficulty of Paying Living Expenses: Not very hard   Food Insecurity: No Food Insecurity     Worried About Running Out of Food in the Last Year: Never true     Ran Out of Food in the Last Year: Never true   Transportation Needs: No Transportation Needs     Lack of Transportation (Medical): No     Lack of Transportation (Non-Medical): No   Physical Activity: Insufficiently Active     Days of Exercise per Week: 2 days     Minutes of Exercise per Session: 20 min   Stress: Stress Concern Present     Feeling of Stress : Very much   Social Connections: Socially Isolated     Frequency of Communication with Friends and Family: Once a week     Frequency of Social Gatherings with Friends and Family: Once a week     Attends Latter-day Services: Never     Active Member of Clubs or Organizations: Yes     Attends Club or Organization Meetings: Not on file     Marital Status: Never    Intimate Partner Violence: Not on file   Housing Stability: Low Risk      Unable to Pay for Housing in the Last Year: No     Number of Places Lived in the Last Year: 2     Unstable Housing in the Last Year: No         FAMILY HISTORY:  Family History   Problem Relation Age of Onset     Hypertension Mother      Breast Cancer Mother 65     Hyperlipidemia Father      Depression Sister      Anxiety Disorder Sister      Thyroid Disease Sister       Narcolepsy Sister      Thyroid Disease Maternal Grandmother      Depression Sister      Attention Deficit Disorder Sister      Colon Cancer Paternal Uncle         dx after age 50         PHYSICAL EXAM:  ECO  GENERAL/CONSTITUTIONAL: No acute distress. Healthy, alert. Fatigued, tearful.  EYES: No scleral icterus.  No redness or discharge.    RESPIRATORY: No audible wheeze, cough, or visible cyanosis.  No visible retractions or increased work of breathing.  Able to speak fully in complete sentences.  MUSCULOSKELETAL: Normal range of motion.  NEUROLOGIC: Alert, oriented, answers questions appropriately. No tremor. Mentation intact and speech normal  INTEGUMENTARY: No jaundice.  No obvious rash or skin lesions.  PSYCHIATRIC:  Mentation appears normal, affect normal/bright, judgement and insight intact, normal speech and appearance well-groomed.    The rest of a comprehensive physical exam is deferred due to public health emergency video visit restrictions.        LABS:   Latest Reference Range & Units 10/19/22 13:29   Sodium 133 - 144 mmol/L 140   Potassium 3.4 - 5.3 mmol/L 3.8   Chloride 94 - 109 mmol/L 107   Carbon Dioxide 20 - 32 mmol/L 29   Urea Nitrogen 7 - 30 mg/dL 6 (L)   Creatinine 0.52 - 1.04 mg/dL 0.73   GFR Estimate >60 mL/min/1.73m2 >90   Calcium 8.5 - 10.1 mg/dL 8.8   Anion Gap 3 - 14 mmol/L 4   Albumin 3.4 - 5.0 g/dL 4.3   Protein Total 6.8 - 8.8 g/dL 7.4   Alkaline Phosphatase 40 - 150 U/L 66   ALT 0 - 50 U/L 21   AST 0 - 45 U/L 19   Bilirubin Total 0.2 - 1.3 mg/dL 0.4   Glucose 70 - 99 mg/dL 90   WBC 4.0 - 11.0 10e3/uL 4.4   Hemoglobin 11.7 - 15.7 g/dL 12.2   Hematocrit 35.0 - 47.0 % 36.7   Platelet Count 150 - 450 10e3/uL 255   RBC Count 3.80 - 5.20 10e6/uL 4.24   MCV 78 - 100 fL 87   MCH 26.5 - 33.0 pg 28.8   MCHC 31.5 - 36.5 g/dL 33.2   RDW 10.0 - 15.0 % 12.1   % Neutrophils % 65   % Lymphocytes % 25   % Monocytes % 6   % Eosinophils % 3   % Basophils % 1   Absolute Basophils 0.0 - 0.2 10e3/uL 0.1    Absolute Eosinophils 0.0 - 0.7 10e3/uL 0.1   Absolute Immature Granulocytes <=0.4 10e3/uL 0.0   Absolute Lymphocytes 0.8 - 5.3 10e3/uL 1.1   Absolute Monocytes 0.0 - 1.3 10e3/uL 0.3   % Immature Granulocytes % 0   Absolute Neutrophils 1.6 - 8.3 10e3/uL 2.9   % Retic 0.5 - 2.0 % 1.3   Absolute Retic 0.025 - 0.095 10e6/uL 0.054        [facility] Reference Range & Units 10/05/22 08:23   Ferritin 12 - 150 ng/mL 29   Iron 35 - 180 ug/dL 186 (H)   Iron Binding Cap 240 - 430 ug/dL 370   Iron Saturation Index 15 - 46 % 50 (H)         PATHOLOGY:  RESULTS    HEMOCHROMATOSIS RESULTS     HFE Gene C282Y (G845A) RESULTS:     C282Y Mutation Interpretation: NORMAL     HFE Gene H63D (C187G) RESULTS:     H63D Mutation Interpretation: NORMAL     HFE Gene S65C (A193T) RESULTS:     S65C Mutation Interpretation: NORMAL   INTERPRETATION    This patient does not carry the C282Y, the H63D or the S65C mutations in the HFE gene. The absence of these mutations, particularly in non-Caucasians, does not rule out the presence of hemochromatosis.         ASSESSMENT/PLAN:  Jessica Mello is a 41 year old female who is referred for elevated iron. Past medical history is significant for depression and chronic fatigue.     1) Elevated iron, iron saturation  -Patient with consistently low normal ferritin.  -Celiac testing in the past has returned negative.  -Calculated TSAT is 50%.  -She has no known history of liver disease or family history of liver disease, hematologic disorder, phlebotomy.  -She is likely developing iron deficiency. She denies excessive menstrual bleed and no gross evidence of GI/ bleed.   -Hemochromatosis testing returned negative (reviewed negative C282Y, H63D, S65C).   -Patient is to have repeat labs tomorrow with sleep study team and will follow up on results of this. Discussed possible expanded HH testing for more rare mutations and MRI liver.     2) Leukopenia, resolved  -Normal diff.  -This is likely due to stimulant  therapy.  -Monitor for drug induced neutropenia.    3) Chronic fatigue  -Rheum workup negative in the past.  -Follow with PCP/sleep medicine.    4) Follow up with labs results by end of week and then will contact patient in regards to above. Otherwise, will follow every 6-12 months to monitor for developing anemia. If this is the case, patient should have GI evaluation.           Nory Costello DO  Hematology/Oncology  [facility]          Again, thank you for allowing me to participate in the care of your patient.        Sincerely,        Nory Costello DO

## 2022-11-15 NOTE — LETTER
11/15/2022         RE: Jessica Mello  [address]  [place] [address]        Dear Colleague,    Thank you for referring your patient, Jessica Mello, to the [facility]. Please see a copy of my visit note below.    Start time: 8:46 AM  End time: 9:10 AM  AmWell. Patient in her own home.    [facility]    Hematology/Oncology Established Patient Note      Today's Date: 11/15/22    Reason for Consultation: Elevated iron, fatigue  Referring Provider: Liberty Alvarez MD      HISTORY OF PRESENT ILLNESS: Jessica Mello is a 41 year old female who is referred for elevated iron. Past medical history is significant for depression and chronic fatigue.     She has chronic fatigue. She has to sleep every few hours. She was previously on disability, but now is working part-time as a . She has difficulty doing ADLs due to fatigue. She has had rheumatologic evaluation previously for an elevated KEIRY. She denies history of anemia. She has never been on iron supplementation or OTC vitamins.     Patient has limited labs within our system. Record review shows normal CBC. She was seen by a sleep specialist who ordered iron studies. These have been tested in July and October 2022 showing low normal ferritin stores and elevated iron.     Last year, she reports weight loss of 5-10 lbs, of which she has gained back. No fever, drenching night sweats, LAD. No gross evidence of bleed.    Menses started at the age of 17 yo. Regular, not excessively heavy.     Medication list review: she has been on adderall and sonata for a month. Prozac one year. Atarax just a few times a week.    Family history: Mother diagnosed with breast cancer, age 65. No family history of CRC. No family history of liver disease/phlebotomy.     Cancer screening: pap smear overdue. Not of age yet for mammograms or screening colonoscopy.      INTERIM HISTORY:  Energy  levels are stable. Medication changes include: increased adderall use, stopped duloxetine, started mirtazapine.     No unintentional weight loss, drenching night sweats, LAD, fever.       REVIEW OF SYSTEMS:   A 14 point ROS was reviewed with pertinent positives and negatives in the HPI.        HOME MEDICATIONS:  Current Outpatient Medications   Medication Sig Dispense Refill     amphetamine-dextroamphetamine (ADDERALL) 5 MG tablet        fluticasone (FLONASE) 50 MCG/ACT nasal spray Spray 1 spray into both nostrils daily 16 g 5     mirtazapine (REMERON) 7.5 MG tablet Take 7.5 mg by mouth       FLUoxetine (PROZAC) 20 MG capsule Take 1 capsule (20 mg) by mouth daily (Patient not taking: Reported on 11/15/2022) 90 capsule 1     hydrOXYzine (ATARAX) 25 MG tablet Take ONE TO TWO tablets (25-50 mg) by mouth THREE times daily as needed for sleep/anxiety (Patient not taking: Reported on 11/15/2022) 20 tablet 0     traZODone (DESYREL) 50 MG tablet  (Patient not taking: Reported on 10/14/2022)       VITAMIN D PO Take 2,000 Int'l Units by mouth daily (Patient not taking: Reported on 11/15/2022)       zaleplon (SONATA) 10 MG capsule Take 10 mg by mouth           ALLERGIES:  Allergies   Allergen Reactions     Bupropion Unknown     Modafinil Unknown     Penicillins Hives         PAST MEDICAL HISTORY:  Past Medical History:   Diagnosis Date     Depressive disorder 6/2020         PAST SURGICAL HISTORY:  Past Surgical History:   Procedure Laterality Date     NO HISTORY OF SURGERY           SOCIAL HISTORY:  Social History     Socioeconomic History     Marital status: Single     Spouse name: Not on file     Number of children: Not on file     Years of education: Not on file     Highest education level: Not on file   Occupational History     Not on file   Tobacco Use     Smoking status: Former     Packs/day: 0.10     Years: 20.00     Pack years: 2.00     Types: Cigarettes     Start date: 6/2/1998     Quit date: 6/13/2022     Years since  quittin.4     Smokeless tobacco: Never     Tobacco comments:     used to promote wakefulness   Vaping Use     Vaping Use: Never used   Substance and Sexual Activity     Alcohol use: Yes     Comment: 3-4 drinks per week.     Drug use: Yes     Types: Marijuana     Comment: intermittent use for pain/sleep- few times a month     Sexual activity: Not Currently     Birth control/protection: None   Other Topics Concern     Parent/sibling w/ CABG, MI or angioplasty before 65F 55M? No   Social History Narrative     Not on file     Social Determinants of Health     Financial Resource Strain: Low Risk      Difficulty of Paying Living Expenses: Not very hard   Food Insecurity: No Food Insecurity     Worried About Running Out of Food in the Last Year: Never true     Ran Out of Food in the Last Year: Never true   Transportation Needs: No Transportation Needs     Lack of Transportation (Medical): No     Lack of Transportation (Non-Medical): No   Physical Activity: Insufficiently Active     Days of Exercise per Week: 2 days     Minutes of Exercise per Session: 20 min   Stress: Stress Concern Present     Feeling of Stress : Very much   Social Connections: Socially Isolated     Frequency of Communication with Friends and Family: Once a week     Frequency of Social Gatherings with Friends and Family: Once a week     Attends Religion Services: Never     Active Member of Clubs or Organizations: Yes     Attends Club or Organization Meetings: Not on file     Marital Status: Never    Intimate Partner Violence: Not on file   Housing Stability: Low Risk      Unable to Pay for Housing in the Last Year: No     Number of Places Lived in the Last Year: 2     Unstable Housing in the Last Year: No         FAMILY HISTORY:  Family History   Problem Relation Age of Onset     Hypertension Mother      Breast Cancer Mother 65     Hyperlipidemia Father      Depression Sister      Anxiety Disorder Sister      Thyroid Disease Sister       Narcolepsy Sister      Thyroid Disease Maternal Grandmother      Depression Sister      Attention Deficit Disorder Sister      Colon Cancer Paternal Uncle         dx after age 50         PHYSICAL EXAM:  ECO  GENERAL/CONSTITUTIONAL: No acute distress. Healthy, alert. Fatigued, tearful.  EYES: No scleral icterus.  No redness or discharge.    RESPIRATORY: No audible wheeze, cough, or visible cyanosis.  No visible retractions or increased work of breathing.  Able to speak fully in complete sentences.  MUSCULOSKELETAL: Normal range of motion.  NEUROLOGIC: Alert, oriented, answers questions appropriately. No tremor. Mentation intact and speech normal  INTEGUMENTARY: No jaundice.  No obvious rash or skin lesions.  PSYCHIATRIC:  Mentation appears normal, affect normal/bright, judgement and insight intact, normal speech and appearance well-groomed.    The rest of a comprehensive physical exam is deferred due to public health emergency video visit restrictions.        LABS:   Latest Reference Range & Units 10/19/22 13:29   Sodium 133 - 144 mmol/L 140   Potassium 3.4 - 5.3 mmol/L 3.8   Chloride 94 - 109 mmol/L 107   Carbon Dioxide 20 - 32 mmol/L 29   Urea Nitrogen 7 - 30 mg/dL 6 (L)   Creatinine 0.52 - 1.04 mg/dL 0.73   GFR Estimate >60 mL/min/1.73m2 >90   Calcium 8.5 - 10.1 mg/dL 8.8   Anion Gap 3 - 14 mmol/L 4   Albumin 3.4 - 5.0 g/dL 4.3   Protein Total 6.8 - 8.8 g/dL 7.4   Alkaline Phosphatase 40 - 150 U/L 66   ALT 0 - 50 U/L 21   AST 0 - 45 U/L 19   Bilirubin Total 0.2 - 1.3 mg/dL 0.4   Glucose 70 - 99 mg/dL 90   WBC 4.0 - 11.0 10e3/uL 4.4   Hemoglobin 11.7 - 15.7 g/dL 12.2   Hematocrit 35.0 - 47.0 % 36.7   Platelet Count 150 - 450 10e3/uL 255   RBC Count 3.80 - 5.20 10e6/uL 4.24   MCV 78 - 100 fL 87   MCH 26.5 - 33.0 pg 28.8   MCHC 31.5 - 36.5 g/dL 33.2   RDW 10.0 - 15.0 % 12.1   % Neutrophils % 65   % Lymphocytes % 25   % Monocytes % 6   % Eosinophils % 3   % Basophils % 1   Absolute Basophils 0.0 - 0.2 10e3/uL 0.1    Absolute Eosinophils 0.0 - 0.7 10e3/uL 0.1   Absolute Immature Granulocytes <=0.4 10e3/uL 0.0   Absolute Lymphocytes 0.8 - 5.3 10e3/uL 1.1   Absolute Monocytes 0.0 - 1.3 10e3/uL 0.3   % Immature Granulocytes % 0   Absolute Neutrophils 1.6 - 8.3 10e3/uL 2.9   % Retic 0.5 - 2.0 % 1.3   Absolute Retic 0.025 - 0.095 10e6/uL 0.054        [facility] Reference Range & Units 10/05/22 08:23   Ferritin 12 - 150 ng/mL 29   Iron 35 - 180 ug/dL 186 (H)   Iron Binding Cap 240 - 430 ug/dL 370   Iron Saturation Index 15 - 46 % 50 (H)         PATHOLOGY:  RESULTS    HEMOCHROMATOSIS RESULTS     HFE Gene C282Y (G845A) RESULTS:     C282Y Mutation Interpretation: NORMAL     HFE Gene H63D (C187G) RESULTS:     H63D Mutation Interpretation: NORMAL     HFE Gene S65C (A193T) RESULTS:     S65C Mutation Interpretation: NORMAL   INTERPRETATION    This patient does not carry the C282Y, the H63D or the S65C mutations in the HFE gene. The absence of these mutations, particularly in non-Caucasians, does not rule out the presence of hemochromatosis.         ASSESSMENT/PLAN:  Jessica Mello is a 41 year old female who is referred for elevated iron. Past medical history is significant for depression and chronic fatigue.     1) Elevated iron, iron saturation  -Patient with consistently low normal ferritin.  -Celiac testing in the past has returned negative.  -Calculated TSAT is 50%.  -She has no known history of liver disease or family history of liver disease, hematologic disorder, phlebotomy.  -She is likely developing iron deficiency. She denies excessive menstrual bleed and no gross evidence of GI/ bleed.   -Hemochromatosis testing returned negative (reviewed negative C282Y, H63D, S65C).   -Patient is to have repeat labs tomorrow with sleep study team and will follow up on results of this. Discussed possible expanded HH testing for more rare mutations and MRI liver.     2) Leukopenia, resolved  -Normal diff.  -This is likely due to stimulant  therapy.  -Monitor for drug induced neutropenia.    3) Chronic fatigue  -Rheum workup negative in the past.  -Follow with PCP/sleep medicine.    4) Follow up with labs results by end of week and then will contact patient in regards to above. Otherwise, will follow every 6-12 months to monitor for developing anemia. If this is the case, patient should have GI evaluation.           Nory Costello DO  Hematology/Oncology  [facility]          Again, thank you for allowing me to participate in the care of your patient.        Sincerely,        Nory Costello DO

## 2023-02-08 ENCOUNTER — E-VISIT (OUTPATIENT)
Dept: FAMILY MEDICINE | Facility: CLINIC | Age: 43
End: 2023-02-08
Payer: COMMERCIAL

## 2023-02-08 ENCOUNTER — VIRTUAL VISIT (OUTPATIENT)
Dept: FAMILY MEDICINE | Facility: CLINIC | Age: 43
End: 2023-02-08
Payer: COMMERCIAL

## 2023-02-08 ENCOUNTER — TELEPHONE (OUTPATIENT)
Dept: FAMILY MEDICINE | Facility: CLINIC | Age: 43
End: 2023-02-08

## 2023-02-08 ENCOUNTER — APPOINTMENT (OUTPATIENT)
Dept: URBAN - METROPOLITAN AREA CLINIC 259 | Age: 43
Setting detail: DERMATOLOGY
End: 2023-02-08

## 2023-02-08 DIAGNOSIS — G47.419 NARCOLEPSY WITHOUT CATAPLEXY(347.00): ICD-10-CM

## 2023-02-08 DIAGNOSIS — D49.2 NEOPLASM OF UNSPECIFIED BEHAVIOR OF BONE, SOFT TISSUE, AND SKIN: ICD-10-CM

## 2023-02-08 DIAGNOSIS — F41.9 ANXIETY: ICD-10-CM

## 2023-02-08 DIAGNOSIS — F32.1 MODERATE MAJOR DEPRESSION (H): Primary | ICD-10-CM

## 2023-02-08 DIAGNOSIS — L85.3 XEROSIS CUTIS: ICD-10-CM

## 2023-02-08 PROCEDURE — OTHER DEFER: OTHER

## 2023-02-08 PROCEDURE — 99214 OFFICE O/P EST MOD 30 MIN: CPT | Mod: VID | Performed by: FAMILY MEDICINE

## 2023-02-08 PROCEDURE — 99203 OFFICE O/P NEW LOW 30 MIN: CPT

## 2023-02-08 PROCEDURE — OTHER MIPS QUALITY: OTHER

## 2023-02-08 PROCEDURE — 99207 PR NON-BILLABLE SERV PER CHARTING: CPT | Performed by: FAMILY MEDICINE

## 2023-02-08 PROCEDURE — OTHER COUNSELING: OTHER

## 2023-02-08 RX ORDER — DEXTROAMPHETAMINE SACCHARATE, AMPHETAMINE ASPARTATE MONOHYDRATE, DEXTROAMPHETAMINE SULFATE AND AMPHETAMINE SULFATE 5; 5; 5; 5 MG/1; MG/1; MG/1; MG/1
20 CAPSULE, EXTENDED RELEASE ORAL DAILY
COMMUNITY
End: 2023-03-08

## 2023-02-08 RX ORDER — HYDROXYZINE HYDROCHLORIDE 25 MG/1
25-50 TABLET, FILM COATED ORAL 3 TIMES DAILY PRN
Qty: 60 TABLET | Refills: 1 | Status: SHIPPED | OUTPATIENT
Start: 2023-02-08 | End: 2023-12-20

## 2023-02-08 ASSESSMENT — PATIENT HEALTH QUESTIONNAIRE - PHQ9
SUM OF ALL RESPONSES TO PHQ QUESTIONS 1-9: 21
SUM OF ALL RESPONSES TO PHQ QUESTIONS 1-9: 21
10. IF YOU CHECKED OFF ANY PROBLEMS, HOW DIFFICULT HAVE THESE PROBLEMS MADE IT FOR YOU TO DO YOUR WORK, TAKE CARE OF THINGS AT HOME, OR GET ALONG WITH OTHER PEOPLE: EXTREMELY DIFFICULT
10. IF YOU CHECKED OFF ANY PROBLEMS, HOW DIFFICULT HAVE THESE PROBLEMS MADE IT FOR YOU TO DO YOUR WORK, TAKE CARE OF THINGS AT HOME, OR GET ALONG WITH OTHER PEOPLE: EXTREMELY DIFFICULT
SUM OF ALL RESPONSES TO PHQ QUESTIONS 1-9: 20
SUM OF ALL RESPONSES TO PHQ QUESTIONS 1-9: 20

## 2023-02-08 ASSESSMENT — ANXIETY QUESTIONNAIRES
6. BECOMING EASILY ANNOYED OR IRRITABLE: MORE THAN HALF THE DAYS
GAD7 TOTAL SCORE: 17
3. WORRYING TOO MUCH ABOUT DIFFERENT THINGS: MORE THAN HALF THE DAYS
1. FEELING NERVOUS, ANXIOUS, OR ON EDGE: NEARLY EVERY DAY
5. BEING SO RESTLESS THAT IT IS HARD TO SIT STILL: NEARLY EVERY DAY
7. FEELING AFRAID AS IF SOMETHING AWFUL MIGHT HAPPEN: SEVERAL DAYS
2. NOT BEING ABLE TO STOP OR CONTROL WORRYING: NEARLY EVERY DAY
2. NOT BEING ABLE TO STOP OR CONTROL WORRYING: MORE THAN HALF THE DAYS
3. WORRYING TOO MUCH ABOUT DIFFERENT THINGS: NEARLY EVERY DAY
4. TROUBLE RELAXING: NEARLY EVERY DAY
4. TROUBLE RELAXING: NEARLY EVERY DAY
IF YOU CHECKED OFF ANY PROBLEMS ON THIS QUESTIONNAIRE, HOW DIFFICULT HAVE THESE PROBLEMS MADE IT FOR YOU TO DO YOUR WORK, TAKE CARE OF THINGS AT HOME, OR GET ALONG WITH OTHER PEOPLE: EXTREMELY DIFFICULT
8. IF YOU CHECKED OFF ANY PROBLEMS, HOW DIFFICULT HAVE THESE MADE IT FOR YOU TO DO YOUR WORK, TAKE CARE OF THINGS AT HOME, OR GET ALONG WITH OTHER PEOPLE?: EXTREMELY DIFFICULT
GAD7 TOTAL SCORE: 16
5. BEING SO RESTLESS THAT IT IS HARD TO SIT STILL: NEARLY EVERY DAY
7. FEELING AFRAID AS IF SOMETHING AWFUL MIGHT HAPPEN: SEVERAL DAYS
7. FEELING AFRAID AS IF SOMETHING AWFUL MIGHT HAPPEN: SEVERAL DAYS
GAD7 TOTAL SCORE: 17
GAD7 TOTAL SCORE: 16
GAD7 TOTAL SCORE: 17
8. IF YOU CHECKED OFF ANY PROBLEMS, HOW DIFFICULT HAVE THESE MADE IT FOR YOU TO DO YOUR WORK, TAKE CARE OF THINGS AT HOME, OR GET ALONG WITH OTHER PEOPLE?: EXTREMELY DIFFICULT
GAD7 TOTAL SCORE: 16
1. FEELING NERVOUS, ANXIOUS, OR ON EDGE: MORE THAN HALF THE DAYS
6. BECOMING EASILY ANNOYED OR IRRITABLE: MORE THAN HALF THE DAYS
7. FEELING AFRAID AS IF SOMETHING AWFUL MIGHT HAPPEN: SEVERAL DAYS

## 2023-02-08 ASSESSMENT — LOCATION DETAILED DESCRIPTION DERM
LOCATION DETAILED: RIGHT INGUINAL CREASE
LOCATION DETAILED: RIGHT RADIAL DORSAL HAND
LOCATION DETAILED: LEFT RADIAL DORSAL HAND

## 2023-02-08 ASSESSMENT — LOCATION ZONE DERM
LOCATION ZONE: TRUNK
LOCATION ZONE: HAND

## 2023-02-08 ASSESSMENT — LOCATION SIMPLE DESCRIPTION DERM
LOCATION SIMPLE: LEFT HAND
LOCATION SIMPLE: GROIN
LOCATION SIMPLE: RIGHT HAND

## 2023-02-08 NOTE — PROCEDURE: COUNSELING
Detail Level: Zone
Patient Specific Counseling (Will Not Stick From Patient To Patient): Neoplasm evaluated for removal by Dr. Bass.
Detail Level: Detailed

## 2023-02-08 NOTE — TELEPHONE ENCOUNTER
Patient sent e-visit today but really needs virtual phone/video or in person visit. Please schedule her asap in the next 1-2 days (I have 5 pm opening today). Also sent message to Teams on scheduling this visit.

## 2023-02-08 NOTE — HPI: CYST
Is This A New Presentation, Or A Follow-Up?: Cyst
Additional History: Patient would like the cyst removed

## 2023-02-08 NOTE — PROCEDURE: DEFER
Detail Level: Detailed
X Size Of Lesion In Cm (Optional): 8
Introduction Text (Please End With A Colon): The following procedure was deferred:
Scheduling Instructions (Optional): with Dr. Bass
Instructions (Optional): Task sent to Dr. Bass to schedule patient
Size Of Lesion In Cm (Optional): 4
Procedure To Be Performed At Next Visit: Excision

## 2023-02-08 NOTE — PROGRESS NOTES
Jessica is a 42 year old who is being evaluated via a billable video visit.      How would you like to obtain your AVS? MyChart  If the video visit is dropped, the invitation should be resent by: Text to cell phone: [phone]  Will anyone else be joining your video visit? No          Assessment & Plan     Moderate major depression (H)  Anxiety  Flare of symptoms off her SSRI.  She is hesitant to restart medication as she may have an upcoming sleep study where it is necessary to not take the medication within weeks of the study.  I will refill her hydroxyzine for as needed use and to help with sleep.  We also discussed non SSRI medication such as gabapentin, Seroquel, buspirone.  She will check with a new sleep clinic to see if any of these meds would cause issue with the sleep study and message me on what she finds.  A medication will then be sent in for her.  We talked through the medication benefits, side effects if we were to start 1 of these options.  She has no suicidal ideation or thoughts or plan.  Encouraged exercise as she is able especially low impact/low energy things such as valerio chi or gentle yoga.  - hydrOXYzine (ATARAX) 25 MG tablet; Take 1-2 tablets (25-50 mg) by mouth 3 times daily as needed for other (sleep/anxiety)  - Adult Mental Health  Referral; Future    Narcolepsy without cataplexy(347.00)  Ongoing, severe and not yet controlled.  She is on Adderall currently and has consult with sleep clinic upcoming.  - Adult Mental Health  Referral; Future         Patient Instructions   Consider Valerio Chi for exercise.     Message me with what sleep clinic says about med option. We can consider seroquel, buspirone or gabapentin to help manage sleep/anxiety better until you can restart the fluoxetine.           Return in about 1 month (around 3/8/2023) for Follow up.    Liberty Alvarez MD  [facility]   Jessica is a 42 year old, presenting  for the following health issues:  Anxiety and Depression      History of Present Illness       Mental Health Follow-up:  Patient presents to follow-up on Depression & Anxiety.Patient's depression since last visit has been:  Worse  The patient is not having other symptoms associated with depression.  Patient's anxiety since last visit has been:  Worse  The patient is having other symptoms associated with anxiety.  Any significant life events: health concerns  Patient is feeling anxious or having panic attacks.  Patient has no concerns about alcohol or drug use.    She eats 2-3 servings of fruits and vegetables daily.She consumes 0 sweetened beverage(s) daily.She exercises with enough effort to increase her heart rate 9 or less minutes per day.  She exercises with enough effort to increase her heart rate 3 or less days per week. She is missing 1 dose(s) of medications per week.  She is not taking prescribed medications regularly due to side effects and other.    Today's PHQ-9         PHQ-9 Total Score: 20    PHQ-9 Q9 Thoughts of better off dead/self-harm past 2 weeks :   Not at all    How difficult have these problems made it for you to do your work, take care of things at home, or get along with other people: Extremely difficult  Today's BYRON-7 Score: 17       Has not been feeling good lately  Having a hard time managing stress levels  Has been off fluoxetine but interested in restarting. Stopped med Nov 1st. Sleep specialist with [facility] started mirtazapine as an alternative nov-Jan. Stopped this med on Feb 10th.   Has also tried several sleep medications with [facility]  Hydroxyzine took yesterday and would like refill.   appt on 2/27/23 with another sleep specialist.  She is hesitant to start on an antidepressant right now as she may need another sleep study and those medicines cannot be taken at the time or around the sleep study.  Continuing on the adderall    Anxiety is preventing sleep. Tends to get only 4-6 hours per  night  When she doesn't sleep feels more depressed.   No SI/self harm thoughts.   Snack for meals but feels she needs to eat better.     Tried to schedule for therapy but was unsuccessful (availability) and also tried through work assistance.     [facility] Will be starting a multivitamin and iron.     Neck and shoulder pain are better after PHYSICAL THERAPY.   Trying to work on her stretches that she learned.     PHQ-9 SCORE 10/3/2022 2/8/2023 2/8/2023   PHQ-9 Total Score MyChart 18 (Moderately severe depression) 20 (Severe depression) 21 (Severe depression)   PHQ-9 Total Score 18 21 20     BYRON-7 SCORE 7/11/2022 2/8/2023 2/8/2023   Total Score 15 (severe anxiety) 17 (severe anxiety) 16 (severe anxiety)   Total Score 15 16 17               Objective           Vitals:  No vitals were obtained today due to virtual visit.    Physical Exam   GENERAL: Healthy, alert and no distress  EYES: Eyes grossly normal to inspection.  No discharge or erythema, or obvious scleral/conjunctival abnormalities.  RESP: No audible wheeze, cough, or visible cyanosis.  No visible retractions or increased work of breathing.    SKIN: Visible skin clear. No significant rash, abnormal pigmentation or lesions.  NEURO: Cranial nerves grossly intact.  Mentation and speech appropriate for age.  PSYCH: Mentation appears normal, affect normal/bright, judgement and insight intact, normal speech and appearance well-groomed.                Video-Visit Details    Type of service:  Video Visit     Originating Location (pt. Location): Home    Distant Location (provider location):  Off-site  Platform used for Video Visit: Quantified Skin

## 2023-02-08 NOTE — PATIENT INSTRUCTIONS
"  Thank you for choosing us for your care. I think an in-clinic or virtual video/telephone visit would be best next steps based on your symptoms. Please schedule a clinic appointment; you won t be charged for this eVisit.      You can schedule an appointment right here in [facility], or call [phone]      Warning Signs of Suicide and What You Can Do  If you think a person could be suicidal, ask, \"Have you thought about suicide?\" Asking won't make it more likely that they will try to hurt themselves. In fact, most people with suicidal thoughts say they are relieved when the question is asked.   If they say yes, they may already have a plan for how and when they will attempt it. Find out as much as you can. The more detailed the plan, and the easier it is to carry out, the more danger the person is in right now.     Know the warning signs  The warning signs for suicide include:    Threats or talk of suicide    Talking about death and dying    Changing eating or sleeping habits (for instance, not sleeping or sleeping all of the time)    Feeling hopeless    Suddenly buying a gun or other weapon    Saying things such as \"Soon, I won't be a problem\" or \"Nothing matters\"    Giving away things they own, making out a will, or planning their     Suddenly being happy or calm after being depressed  Things that put a person at a higher risk of attempting suicide include:     A history of suicide in the person's family    Past suicide attempts    Alcohol and drug use, along with impulsive behaviors    Having a diagnosed mood disorder such as depression or bipolar disorder    History of trauma or abuse including bullying    Major losses such as a divorce, death of a loved one, money problems, or legal problems    Having access to a lethal weapon (such as guns in the home)    Long-term (chronic) physical illnesses, including chronic pain    Being around others with suicidal behavior  Get help  Don't try to handle this " alone. You can be the most help by getting the person to a trained professional. Suicidal thinking may be a sign of depression. This is a serious but treatable illness.   In an emergency--call 911  Don't leave the person alone. Anyone who is at imminent risk of suicide needs psychiatric services right away. The person must be constantly watched, and never left out of sight. Call 911 or a 24-hour suicide crisis hotline. You can search for this online. You can also take the person to the nearest hospital emergency room.   Don't keep it a secret and don't wait  Call a mental health clinic or a licensed mental health professional in your area right away. This may be a psychiatrist, clinical psychologist, psychiatric or licensed clinical , marriage and family counselor, or clergy. If you don't know how to contact such professionals and there is an immediate risk, call 911. Tell them you need help for a person who is thinking about suicide.   Resources    National Suicide Prevention Hceaxeyr487-831-2938 (747-042-ICNS)www.suicidepreventionlifeline.org    National Suicide Kxzpiib131-697-4452 (800-SUICIDE)    National [place] of Mental Mlaprk760-453-9751jmp.[facility].nih.gov    National [place] on Mental Wboagzw671-099-0599ndx.[place].org    Mental Health Nvomwtq224-195-4873ejh.[facility].org    Cy last reviewed this educational content on 1/1/2020 2000-2021 [employer]. All rights reserved. This information is not intended as a substitute for professional medical care. Always follow your healthcare professional's instructions.          Recognizing Suicide Warning Signs in Yourself     People who are thinking about suicide may not know they are depressed. Certain thoughts, feelings, and actions can be signals that let you know you may need help. The best thing you can do is watch for signs that you may be at risk. Then, ask for help. You can talk with your regular healthcare provider or get help from  "a mental health provider.   Depression  Depression is a treatable illness, just like diabetes or heart disease. And like those illnesses, depression is not something that you can just \"snap out of.\" To feel better, treatment is needed before depression gets to a point that it can endanger your life. To know if depression is causing you to feel like ending your life, ask yourself:     Do I feel worthless, guilty, helpless, or hopeless?    Have I been feeling sad, down, or blue on most days?    Have I lost interest in my work or people I used to enjoy?    Do I have trouble sleeping or do I sleep too much?    Do I eat more or less than normal?    Do I feel tired, weak, and low on energy?    Do I feel restless and unable to sit still?    Do I have trouble thinking or making choices?    Do I cry more than normal?    Do I feel life isn't worth living?  Warning signs for suicide  Call your healthcare provider or get help right away if you have any of the warning signs below. You can also call a mental health clinic or a 24-hour suicide crisis hotline for help and support. Warning signs for suicide include:     Thinking often about taking your life    Planning how you may attempt it    Talking or writing about committing suicide    Feeling that death is the only solution to your problems    Feeling a pressing need to make out your will or arrange your     Giving away things you own    Taking part in risky behaviors, such as having sex with someone you don't know, or drinking and driving    Buying a lethal weapon, such as a gun, or hoarding medicines that could be used in an overdose  Call 911  If you are in immediate risk of harming yourself, call 911  To learn more  For more information about depression and suicide prevention:     National [place] of Mental Health 300-115-5546dke.[facility].nih.gov    National Suicide Prevention Lifeline 417-824-9621 (221-785-FCNA) www.suicidepreventionlifeline.org    National [place] " on Mental Illness 452-370-6520ytp.[place].org    Mental Health Wwfkrqd639-841-4818wqm.[facility].org    National Suicide Knyxpqg534-042-5562 (800-SUICIDE)  Cy last reviewed this educational content on 5/1/2020 2000-2021 [employer]. All rights reserved. This information is not intended as a substitute for professional medical care. Always follow your healthcare professional's instructions.          Depression: Tips to Help Yourself    As your healthcare providers help treat your depression, you can also help yourself. Keep in mind that your illness affects you emotionally, physically, mentally, and socially. So full recovery will take time. Take care of your body and your soul, and be patient with yourself as you get better.  Self-care    Educate yourself. Read about treatment and medicine options. If you have the energy, attend local conferences or support groups. Keep a list of useful websites and helpful books and use them as needed. This illness is not your fault. Don t blame yourself for your depression.    Manage early symptoms. If you notice symptoms returning, experience triggers, or identify other factors that may lead to a depressive episode, get help as soon as possible. Ask trusted friends and family to monitor your behavior and let you know if they see anything of concern.    Work with your provider. Find a provider you can trust. Communicate honestly with that person and share information on your treatment for depression and your reaction to medicines.    Be prepared for a crisis. Know what to do if you experience a crisis. Keep the phone number of a crisis hotline and know the location of your community's urgent care centers and the closest emergency department.    Hold off on big decisions. Depression can cloud your judgment. So wait until you feel better before making major life decisions, such as changing jobs, moving, or getting  or .    Be patient. Recovering from  depression is a process. Don t be discouraged if it takes some time to feel better.    Keep it simple. Depression saps your energy and concentration. So you won t be able to do all the things you used to do. Set small goals and do what you can.    Be with others. Don t isolate yourself--you ll only feel worse. Try to be with other people. And take part in fun activities when you can. Go to a movie, ballgame, Anabaptism service, or social event. Talk openly with people you can trust. And accept help when it s offered.    Take care of your body  People with depression often lose the desire to take care of themselves. That only makes their problems worse. During treatment and afterward, make a point to:    Exercise. It s a great way to take care of your body. And studies have shown that exercise helps fight depression. Aim for 30 minutes of moderate activity a day. Walking in small blocks of time (5-10 minutes) is a good way to start, but anything that gets you moving (gardening, house cleaning) counts.    Don't use drugs and alcohol. These may ease the pain in the short term. But they ll only make your problems worse in the long run.    Get relief from stress. Ask your healthcare provider for relaxation exercises and techniques to help relieve stress. Consider activities like meditation, yoga, or Valerio Chi.    Eat right. A balanced and healthy diet helps keep your body healthy.    Get adequate sleep. Aim for 8 hours per night. Too much or too little sleep can cause other physical and emotional problems.  [employer] last reviewed this educational content on 12/1/2019 2000-2021 [employer]. All rights reserved. This information is not intended as a substitute for professional medical care. Always follow your healthcare professional's instructions.          Depression Affects Your Mind and Body  Everyone feels sad or  blue  from time to time for a few days or weeks. Depression is when these feelings don't go  away and they interfere with daily life. Depression is a real illness that can develop at any age. It is one of the most common mental health problems in the [place] Depression makes you feel sad, helpless, and hopeless. It gets in the way of your life and relationships. Depression causes chemical changes in the brain that inhibit your ability to think and act. But, with help, you can feel better again.     Depression affects your whole body  Brain chemicals affect your body as well as your mood. So depression may do more than just make you feel low. You may also feel bad physically. Depression can:     Cause trouble with mental tasks such as remembering, concentrating, or making decisions    Make you feel nervous and jumpy    Cause trouble sleeping. Or you may sleep too much.    Change your appetite    Cause headaches, stomachaches, or other aches and pains    Drain your body of energy  Depression and other illness  It is common for people who have chronic health problems to also have depression. It can often be hard to tell which one caused the other. A person might become depressed after finding out they have a health problem. But some studies suggest being depressed may make certain health problems more likely. And some depressed people stop taking care of themselves. This may make them more likely to get sick.   [employer] last reviewed this educational content on 5/1/2020 2000-2021 [employer]. All rights reserved. This information is not intended as a substitute for professional medical care. Always follow your healthcare professional's instructions.

## 2023-02-09 ENCOUNTER — MYC MEDICAL ADVICE (OUTPATIENT)
Dept: FAMILY MEDICINE | Facility: CLINIC | Age: 43
End: 2023-02-09
Payer: COMMERCIAL

## 2023-02-09 DIAGNOSIS — F41.9 ANXIETY: Primary | ICD-10-CM

## 2023-02-09 RX ORDER — GABAPENTIN 300 MG/1
CAPSULE ORAL
Qty: 75 CAPSULE | Refills: 0 | Status: SHIPPED | OUTPATIENT
Start: 2023-02-09 | End: 2023-03-14

## 2023-02-09 ASSESSMENT — PATIENT HEALTH QUESTIONNAIRE - PHQ9: SUM OF ALL RESPONSES TO PHQ QUESTIONS 1-9: 21

## 2023-02-09 ASSESSMENT — ANXIETY QUESTIONNAIRES: GAD7 TOTAL SCORE: 16

## 2023-02-09 NOTE — PATIENT INSTRUCTIONS
Consider Valerio Chi for exercise.     Message me with what sleep clinic says about med option. We can consider seroquel, buspirone or gabapentin to help manage sleep/anxiety better until you can restart the fluoxetine.

## 2023-03-06 PROBLEM — G89.29 CHRONIC RIGHT SHOULDER PAIN: Status: RESOLVED | Noted: 2022-05-16 | Resolved: 2023-03-06

## 2023-03-06 PROBLEM — M54.50 CHRONIC MIDLINE LOW BACK PAIN WITHOUT SCIATICA: Status: RESOLVED | Noted: 2022-05-16 | Resolved: 2023-03-06

## 2023-03-06 PROBLEM — G89.29 CHRONIC MIDLINE LOW BACK PAIN WITHOUT SCIATICA: Status: RESOLVED | Noted: 2022-05-16 | Resolved: 2023-03-06

## 2023-03-06 PROBLEM — M25.511 CHRONIC RIGHT SHOULDER PAIN: Status: RESOLVED | Noted: 2022-05-16 | Resolved: 2023-03-06

## 2023-03-06 ASSESSMENT — ANXIETY QUESTIONNAIRES
GAD7 TOTAL SCORE: 19
3. WORRYING TOO MUCH ABOUT DIFFERENT THINGS: MORE THAN HALF THE DAYS
GAD7 TOTAL SCORE: 19
4. TROUBLE RELAXING: NEARLY EVERY DAY
1. FEELING NERVOUS, ANXIOUS, OR ON EDGE: MORE THAN HALF THE DAYS
7. FEELING AFRAID AS IF SOMETHING AWFUL MIGHT HAPPEN: NEARLY EVERY DAY
8. IF YOU CHECKED OFF ANY PROBLEMS, HOW DIFFICULT HAVE THESE MADE IT FOR YOU TO DO YOUR WORK, TAKE CARE OF THINGS AT HOME, OR GET ALONG WITH OTHER PEOPLE?: EXTREMELY DIFFICULT
6. BECOMING EASILY ANNOYED OR IRRITABLE: NEARLY EVERY DAY
2. NOT BEING ABLE TO STOP OR CONTROL WORRYING: NEARLY EVERY DAY
7. FEELING AFRAID AS IF SOMETHING AWFUL MIGHT HAPPEN: NEARLY EVERY DAY
IF YOU CHECKED OFF ANY PROBLEMS ON THIS QUESTIONNAIRE, HOW DIFFICULT HAVE THESE PROBLEMS MADE IT FOR YOU TO DO YOUR WORK, TAKE CARE OF THINGS AT HOME, OR GET ALONG WITH OTHER PEOPLE: EXTREMELY DIFFICULT
5. BEING SO RESTLESS THAT IT IS HARD TO SIT STILL: NEARLY EVERY DAY
GAD7 TOTAL SCORE: 19

## 2023-03-06 NOTE — PROGRESS NOTES
Discharge Note    Progress reporting period is from initial evaluation date (please see noted date below) to Jul 14, 2022.  Linked Episodes   Type: Episode: Status: Noted: Resolved: Last update: Updated by:   PHYSICAL THERAPY R shoulder pain, midline LBP without sciatica 5/16/22 Active 5/16/2022 7/14/2022 11:59 AM Erin Iraheta, PT      Comments:       Jessica failed to follow up and current status is unknown.  Please see information below for last relevant information on current status.  Patient seen for 6 visits.    SUBJECTIVE  Subjective changes noted by patient:  Overall feeling progress as is feeling better  Now lifting cup, groceries and opening refrigerator are good, but reaching arm overhead- washing/doing hair or reaching forward painful.  .  Current pain level is 1/10 (0/10 LB, 1/10 R shoulder).     Previous pain level was  9/10 (9/10 at worst shoulder, LB 5-6/10 at worst).   Changes in function:  Yes (See Goal flowsheet attached for changes in current functional level)  Adverse reaction to treatment or activity: None    OBJECTIVE  Changes noted in objective findings: Improving core stabilization- able to progress; MMT TA 3/5,  Cervical retraction extension still lessens shoulder pain.     ASSESSMENT/PLAN  Diagnosis: Chronic R shoulder pain   Updated problem list and treatment plan:   Pain - HEP  Decreased ROM/flexibility - HEP  Decreased function - HEP  Decreased strength - HEP  Impaired posture - HEP  STG/LTGs have been met or progress has been made towards goals:  Yes, please see goal flowsheet for most current information  Assessment of Progress: current status is unknown.    Last current status:   Progressing as expected and progressing well.  Self Management Plans:  HEP  I have re-evaluated this patient and find that the nature, scope, duration and intensity of the therapy is appropriate for the medical condition of the patient.  Jessica continues to require the following intervention to meet STG  and LTG's:  HEP.    Recommendations:  Discharge with current home program.  Patient to follow up with MD as needed.    Please refer to the daily flowsheet for treatment today, total treatment time and time spent performing 1:1 timed codes.

## 2023-03-07 ENCOUNTER — VIRTUAL VISIT (OUTPATIENT)
Dept: PSYCHOLOGY | Facility: CLINIC | Age: 43
End: 2023-03-07
Payer: COMMERCIAL

## 2023-03-07 DIAGNOSIS — F34.1 PERSISTENT DEPRESSIVE DISORDER: Primary | ICD-10-CM

## 2023-03-07 PROCEDURE — 90837 PSYTX W PT 60 MINUTES: CPT | Mod: VID | Performed by: COUNSELOR

## 2023-03-07 ASSESSMENT — COLUMBIA-SUICIDE SEVERITY RATING SCALE - C-SSRS
TOTAL  NUMBER OF ABORTED OR SELF INTERRUPTED ATTEMPTS LIFETIME: NO
6. HAVE YOU EVER DONE ANYTHING, STARTED TO DO ANYTHING, OR PREPARED TO DO ANYTHING TO END YOUR LIFE?: NO
2. HAVE YOU ACTUALLY HAD ANY THOUGHTS OF KILLING YOURSELF?: NO
ATTEMPT LIFETIME: NO
1. HAVE YOU WISHED YOU WERE DEAD OR WISHED YOU COULD GO TO SLEEP AND NOT WAKE UP?: NO
TOTAL  NUMBER OF INTERRUPTED ATTEMPTS LIFETIME: NO

## 2023-03-07 ASSESSMENT — PATIENT HEALTH QUESTIONNAIRE - PHQ9
10. IF YOU CHECKED OFF ANY PROBLEMS, HOW DIFFICULT HAVE THESE PROBLEMS MADE IT FOR YOU TO DO YOUR WORK, TAKE CARE OF THINGS AT HOME, OR GET ALONG WITH OTHER PEOPLE: EXTREMELY DIFFICULT
SUM OF ALL RESPONSES TO PHQ QUESTIONS 1-9: 18
SUM OF ALL RESPONSES TO PHQ QUESTIONS 1-9: 18

## 2023-03-07 NOTE — PROGRESS NOTES
"[facility]   Mental Health & Addiction Services     Progress Note - Initial Visit    Patient  Name:  Jessica Mello Date: 3/7/23         Service Type: Individual     Visit Start Time: 10:00am Visit End Time: 10:58 am    Visit #: 1    Attendees: Client attended alone    Service Modality:  Video Visit:      Provider verified identity through the following two step process.  Patient provided:  Patient     Telemedicine Visit: The patient's condition can be safely assessed and treated via synchronous audio and visual telemedicine encounter.      Reason for Telemedicine Visit: Patient has requested telehealth visit    Originating Site (Patient Location): Patient's home    Distant Site (Provider Location): Provider Remote Setting- Home Office    Consent:  The patient/guardian has verbally consented to: the potential risks and benefits of telemedicine (video visit) versus in person care; bill my insurance or make self-payment for services provided; and responsibility for payment of non-covered services.     Patient would like the video invitation sent by:  My Chart    Mode of Communication:  Video Conference via Amwell    Distant Location (Provider):  Off-site    As the provider I attest to compliance with applicable laws and regulations related to telemedicine.       DATA:   Interactive Complexity: No   Crisis: No     Presenting Concerns/  Current Stressors:   The reason for seeking services at this time is: \" Recommended by doctor, michaela had some issues diagnosed with narcolepsy. Different medication each month, medications having adverse affects\"  Adderal now, excessive daytime sleepiness, sleep attacks.   The problem(s) began Chronically exhausted for 8-9 years. Sought help in  and again in , full work up, labs, specialists with this job , anti depressant, adverse affects, fluoxitine worked after that. Sleep specialist, dec 2021. Spring after, Chronic insomnia, disruptive " "sleep, 2 hours of sleep, for days, mood started worsening. July new specialist, trying multiple sleep medications, bad mood affects from medications., crying etc. Sleep inertia in the morning, doesn't like sleep medications. October / Nov off of fluoxitine, mertazopine? \"Sleep study wasn't done properly\", new sleep study cant be on selective serotonin reuptake inhibitor when starting a new sleep study. 4 weeks off of an selective serotonin reuptake inhibitor. Consult with Psychiatrist, not an issue but comorbidity of sleep issues she was told. Recommended therapy, difficult to get an appointment.  Patient has attempted to resolve these concerns in the past through no therapy, medical specialists for sleep issues.    ASSESSMENT:  Mental Status Assessment:  Appearance:   Appropriate   Eye Contact:   Fair   Psychomotor Behavior: Normal  slowed   Attitude:   Cooperative  Pleasant Guarded  Peter  Orientation:   All  Speech   Rate / Production: Normal/ Responsive Monotone  Slow    Volume:  Normal   Mood:    Depressed  Apathetic Ambivalence  Affect:    Appropriate  Blunted  Flat  Restricted  Subdued   Thought Content:  Clear   Thought Form:  Coherent  Goal Directed  Logical   Insight:    Fair  and Intellectual Insight      Safety Issues and Plan for Safety and Risk Management:     [place] Suicide Severity Rating Scale (Lifetime/Recent)  [place] Suicide Severity Rating (Lifetime/Recent) 11/24/2021 3/7/2023   Wish to be Dead (Lifetime) No -   Non-Specific Active Suicidal Thoughts (Lifetime) No -   RETIRED: 1. Wish to be Dead (Recent) No -   RETIRED: 2. Non-Specific Active Suicidal Thoughts (Recent) No -   3. Active Suicidal Ideation with any Methods (Not Plan) Without Intent to Act (Lifetime) No -   RETIRED: 3. Active Suicidal Ideation with any Methods (Not Plan) Without Intent to Act (Recent) No -   RETIRE: 4. Active Suicidal Ideation with Some Intent to Act, Without Specific Plan (Lifetime) No -   4. Active Suicidal " Ideation with Some Intent to Act, Without Specific Plan (Recent) No -   RETIRE: 5. Active Suicidal Ideation with Specific Plan and Intent (Lifetime) No -   RETIRED: 5. Active Suicidal Ideation with Specific Plan and Intent (Recent) No -   1. Wish to be Dead (Lifetime) - 0   2. Non-Specific Active Suicidal Thoughts (Lifetime) - 0   Actual Attempt (Lifetime) - 0   Has subject engaged in non-suicidal self-injurious behavior? (Lifetime) - 0   Interrupted Attempts (Lifetime) - 0   Aborted or Self-Interrupted Attempt (Lifetime) - 0   Preparatory Acts or Behavior (Lifetime) - 0   Calculated C-SSRS Risk Score (Lifetime/Recent) - No Risk Indicated     Patient denies current fears or concerns for personal safety.  Patient denies current or recent suicidal ideation or behaviors.  Patient denies current or recent homicidal ideation or behaviors.  Patient denies current or recent self injurious behavior or ideation.  Patient denies other safety concerns.  Recommended that patient call 911 or go to the local ED should there be a change in any of these risk factors.  Patient reports there are no firearms in the house.     Diagnostic Criteria:  Persistent Depressive Disorder  A. Depressed mood for most of the day, for more days than not, as indicated either by subjective account or observation by others, for at least 2 years. Note: In children and adolescents, mood can be irritable and duration must be at least 1 year.   B. Presence, while depressed, of two (or more) of the following:        - poor appetite or overeating        - insomnia or hypersomnia       - low energy or fatigue        - poor concentration or difficulty making decisions        - feelings of hopelessness   C. During the 2-year period (1 year for children or adolescents) of the disturbance, the person has never been without the symptoms in Criteria A and B for more than 2 months at a time.  D. Criteria for a major depressive disorder may be continously present  for 2 years  E. There has never been a Manic Episode, a Mixed Episode, or a Hypomanic Episode, and criteria have never been met for Cyclothymic Disorder.   F. The disturbance is not better explained by a persistent schizoaffective disorder, schizophrenia, delusional disorder, or other specified or unspecified schizophrenia spectrum and other psychotic disorder  G. The symptoms are not attributable to the physiological effects of a substance (e.g., a drug of abuse, a medication) or another medical condition (e.g., hypothyroidism).        - Late Onset: if onset is age 21 years or older   Narcolepsy      DSM5 Diagnoses: (Sustained by DSM5 Criteria Listed Above)  Diagnoses: 300.4 (F34.1) Persistent Depressive Disorder, Late onset   PROVISIONAL DIAGNOSIS - major depressive disorder due to another medical condition ie Narcolepsy ( F06.32)  Psychosocial & Contextual Factors: Diagnosed with Narcolepsy, many specialists and medication changes and she isn't feeling better, is affecting work and relationships.  WHODAS 2.0 (12 item):   WHODAS 2.0 Total Score 11/23/2021 3/6/2023   Total Score 36 47   Total Score MyChart 36 47     Intervention:   Psychodynamic- Patient processed internal experiences , Educated on treatment planning and started identifying goals and interventions for treatment plan and completed majority of DA  Collateral Reports Completed:  Not Applicable    PLAN: (Homework, other):  1. Provider will continue Diagnostic Assessment.  Patient was given the following to do until next session:  Friday March 17, 11:00 am    2. Provider recommended the following referrals: discussed / introduced idea of Psych testing.      3.  Suicide Risk and Safety Concerns were assessed for Jessica Mello.    Patient meets the following risk assessment and triage: Patient denied any current/recent/lifetime history of suicidal ideation and/or behaviors.  No safety plan indicated at this time.     TERE Bower-  ATR-BC  Licensed Marriage and Family Therapist and   Registered and Board Certified Art Therapist  March 7, 2023

## 2023-03-07 NOTE — Clinical Note
Lori Alvarez, Liberty Andino,  Dr. Alvarez, Your patient presented to [facility] for a diagnostic evaluation 3/7/23.  They will be beginning individual therapy with me.    Please do not hesitate to contact me if you have any questions in regards to Jessica Mello's care.      Joseph Delaney, LMFT  March 12, 2023 [facility]

## 2023-03-08 ENCOUNTER — VIRTUAL VISIT (OUTPATIENT)
Dept: FAMILY MEDICINE | Facility: CLINIC | Age: 43
End: 2023-03-08
Payer: COMMERCIAL

## 2023-03-08 DIAGNOSIS — F41.9 ANXIETY: Primary | ICD-10-CM

## 2023-03-08 DIAGNOSIS — F32.1 MODERATE MAJOR DEPRESSION (H): ICD-10-CM

## 2023-03-08 DIAGNOSIS — R79.0 RAISED SERUM IRON: ICD-10-CM

## 2023-03-08 DIAGNOSIS — G47.419 NARCOLEPSY WITHOUT CATAPLEXY(347.00): ICD-10-CM

## 2023-03-08 PROCEDURE — 99214 OFFICE O/P EST MOD 30 MIN: CPT | Mod: VID | Performed by: FAMILY MEDICINE

## 2023-03-08 RX ORDER — DEXTROAMPHETAMINE SACCHARATE, AMPHETAMINE ASPARTATE MONOHYDRATE, DEXTROAMPHETAMINE SULFATE AND AMPHETAMINE SULFATE 5; 5; 5; 5 MG/1; MG/1; MG/1; MG/1
20 CAPSULE, EXTENDED RELEASE ORAL DAILY
Qty: 30 CAPSULE | Refills: 0 | Status: SHIPPED | OUTPATIENT
Start: 2023-03-08 | End: 2023-04-09

## 2023-03-08 ASSESSMENT — PATIENT HEALTH QUESTIONNAIRE - PHQ9
SUM OF ALL RESPONSES TO PHQ QUESTIONS 1-9: 18
10. IF YOU CHECKED OFF ANY PROBLEMS, HOW DIFFICULT HAVE THESE PROBLEMS MADE IT FOR YOU TO DO YOUR WORK, TAKE CARE OF THINGS AT HOME, OR GET ALONG WITH OTHER PEOPLE: EXTREMELY DIFFICULT

## 2023-03-08 NOTE — PROGRESS NOTES
Jessica is a 42 year old who is being evaluated via a billable video visit.      How would you like to obtain your AVS? MyChart  If the video visit is dropped, the invitation should be resent by: Text to cell phone: [phone]  Will anyone else be joining your video visit? No          Assessment & Plan     Anxiety  Moderate major depression (H)  slightly improved from last visit. Did restart her ssri and possibly already noting improvement although still early in to taking it.   Gabapentin was helpful for sleep but too tiring during the day. We discussed increasing the hs dosing to 600 mg and even up to 900 mg if needed to hopefully prevent some of the night wakening.   Encouraged f/u with therapist-  - Adult Mental Health  Referral; Future    Narcolepsy without cataplexy(347.00)  Not controlled. Complex case and not really responded well to many meds. She is planning to hold off on further sleep clinic eval for now and hoping psychiatry will have some help for her. She is wondering if I would rx the stimulant. Discussed I can rx as interim until she sees montez or sees sleep clinic again but don't feel comfortable prescribing long-term without guidance from these providers.   - amphetamine-dextroamphetamine (ADDERALL XR) 20 MG 24 hr capsule; Take 1 capsule (20 mg) by mouth daily    Raised serum iron  Heme monitoring. Reviewed she will be due for labs this summer/fall           Patient Instructions   Schedule iron labs this summer/fall    Follow-up for physical this fall    Follow-up as needed pre or post psychiatry visit           Return in about 7 months (around 10/8/2023) for Routine preventive.    Liberty Alvarez MD  [facility]   Jessica is a 42 year old, presenting for the following health issues:  Follow Up and Recheck Medication      History of Present Illness       Mental Health Follow-up:  Patient presents to follow-up on Depression &  Anxiety.Patient's depression since last visit has been:  Medium  The patient is not having other symptoms associated with depression.  Patient's anxiety since last visit has been:  Medium  The patient is not having other symptoms associated with anxiety.  Any significant life events: job concerns  Patient is feeling anxious or having panic attacks.  Patient has no concerns about alcohol or drug use.    She eats 2-3 servings of fruits and vegetables daily.She consumes 0 sweetened beverage(s) daily.She exercises with enough effort to increase her heart rate 9 or less minutes per day.  She exercises with enough effort to increase her heart rate 3 or less days per week. She is missing 1 dose(s) of medications per week.  She is not taking prescribed medications regularly due to other.    Today's PHQ-9         PHQ-9 Total Score: 18    PHQ-9 Q9 Thoughts of better off dead/self-harm past 2 weeks :   Not at all    How difficult have these problems made it for you to do your work, take care of things at home, or get along with other people: Extremely difficult       Met with new sleep specialist- however not going to f/u there as wasn't happy with their approach    Started gabapentin after our last visit. Increased dose to tid and had too much drowsiness with this. Dropped back to bid with skipping morning due to this.   Thinks she is sleeping more soundly but still night wakening several times a night.   Dropped mid-day dose   Anxiety little better this week.   Started fluoxetine last Friday and noting feeling better last 3 days    Therapy appt yesterday am. Might want to find other therapist. Doesn't find value in pursuing. Feels they will not be able to help her.     Considering revisiting with psychiatry. Dr. Escobar    Stopped adderall on Saturday (has 1.5 weeks left on Rx)        PHQ-9 SCORE 2/8/2023 2/8/2023 3/7/2023   PHQ-9 Total Score MyChart 20 (Severe depression) 21 (Severe depression) 18 (Moderately severe  depression)   PHQ-9 Total Score 21 20 18     BYRON-7 SCORE 2/8/2023 2/8/2023 3/6/2023   Total Score 17 (severe anxiety) 16 (severe anxiety) 19 (severe anxiety)   Total Score 16 17 19             Objective    Vitals - Patient Reported  Pain Score: Moderate Pain (4)  Pain Loc: Shoulder      Vitals:  No vitals were obtained today due to virtual visit.    Physical Exam   GENERAL: Healthy, alert and no distress  EYES: Eyes grossly normal to inspection.  No discharge or erythema, or obvious scleral/conjunctival abnormalities.  RESP: No audible wheeze, cough, or visible cyanosis.  No visible retractions or increased work of breathing.    SKIN: Visible skin clear. No significant rash, abnormal pigmentation or lesions.  NEURO: Cranial nerves grossly intact.  Mentation and speech appropriate for age.  PSYCH: Mentation appears normal, affect slightly flat, judgement and insight intact, normal speech and appearance well-groomed.              Video-Visit Details    Type of service:  Video Visit     Originating Location (pt. Location): Home    Distant Location (provider location):  Off-site  Platform used for Video Visit: Effdon

## 2023-03-09 NOTE — PATIENT INSTRUCTIONS
Schedule iron labs this summer/fall    Follow-up for physical this fall    Follow-up as needed pre or post psychiatry visit

## 2023-03-14 NOTE — PROGRESS NOTES
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 or [phone].  5. [employer] may be used to communicate with your care team, but this is not intended to be used for emergencies.    CRISIS RESOURCES:    1. Present to the Emergency Department as needed or call after hours crisis line at [phone] or [phone].   2. [place] Crisis Text Line: Text [place] to 258386.  3. Suicide LifeLine Chat: suicidepreventionCoinSeedline.org/chat/.  4. National Suicide Prevention Lifeline: 304.812.1907 (TTY: 493.754.1809). Call anytime for help.  (www.suicidepreventionlifeline.org)  5. National [place] on Mental Illness (www.nelson.org): 288.239.2973 or 464-065-9124.  6. Mental Health Association (www.mentalhealth.org): 474.198.1171 or 673-897-6265.    ADMINISTRATIVE BILLING:    Time spent interviewing patient, reviewing referral documents, obtaining and reviewing outside records, communication with other health specialists, and preparing this report on today's date  Video/Phone Start Time: 1035  Video/Phone End Time: 1115    Signed:   Fili Copeland DNP, PMVANIAP-BC  Collaborative Care Psychiatry Service (CCPS)

## 2023-03-15 ENCOUNTER — VIRTUAL VISIT (OUTPATIENT)
Dept: BEHAVIORAL HEALTH | Facility: CLINIC | Age: 43
End: 2023-03-15
Payer: COMMERCIAL

## 2023-03-15 ENCOUNTER — VIRTUAL VISIT (OUTPATIENT)
Dept: PSYCHIATRY | Facility: CLINIC | Age: 43
End: 2023-03-15
Attending: FAMILY MEDICINE
Payer: COMMERCIAL

## 2023-03-15 ENCOUNTER — MYC MEDICAL ADVICE (OUTPATIENT)
Dept: FAMILY MEDICINE | Facility: CLINIC | Age: 43
End: 2023-03-15
Payer: COMMERCIAL

## 2023-03-15 DIAGNOSIS — F32.1 CURRENT MODERATE EPISODE OF MAJOR DEPRESSIVE DISORDER WITHOUT PRIOR EPISODE (H): Primary | ICD-10-CM

## 2023-03-15 DIAGNOSIS — G47.419 NARCOLEPSY WITHOUT CATAPLEXY(347.00): Primary | ICD-10-CM

## 2023-03-15 DIAGNOSIS — G47.419 NARCOLEPSY WITHOUT CATAPLEXY(347.00): ICD-10-CM

## 2023-03-15 DIAGNOSIS — F32.1 MODERATE MAJOR DEPRESSION (H): Primary | ICD-10-CM

## 2023-03-15 DIAGNOSIS — F41.9 ANXIETY: ICD-10-CM

## 2023-03-15 PROCEDURE — 99215 OFFICE O/P EST HI 40 MIN: CPT | Mod: VID | Performed by: STUDENT IN AN ORGANIZED HEALTH CARE EDUCATION/TRAINING PROGRAM

## 2023-03-15 PROCEDURE — 90832 PSYTX W PT 30 MINUTES: CPT | Mod: VID | Performed by: SOCIAL WORKER

## 2023-03-15 RX ORDER — ESCITALOPRAM OXALATE 10 MG/1
TABLET ORAL
Qty: 34 TABLET | Refills: 0 | Status: SHIPPED | OUTPATIENT
Start: 2023-03-15 | End: 2023-07-10

## 2023-03-15 NOTE — PATIENT INSTRUCTIONS
Thank you for our time together today in Collaborative Care Psychiatry Service ([facility]). [facility] provides brief psychiatric medication stabilization to patients referred by their Primary Care Providers. Patients are typically seen in [facility] for up to 4 appointments and then referred back to the PCP for ongoing refills unless longer term medication management by a specialist is indicated. If I believe you will benefit from long-term psychiatric care I will discuss this with you. If you are interested in seeing a psychiatrist or psychiatric nurse practitioner long-term, please send me a message in Wear so we can refer you appropriately.     Treatment Plan:  Stop fluoxetine  Start escitalopram (Lexapro) 5mg at bedtime x 7 nights then increase to 10mg nightly for depression  Continue Adderall XR 20mg daily for narcolepsy  Continue gabapentin 600mg at bedtime; ok to increase to 900mg at bedtime per your PCP  Call [phone] to schedule follow up with me in 4 weeks.  You can call the above number to make appointments, leave a message with our nursing team, and inquire about any mental health referrals I have placed.    Long Term Psychiatry  You have been referred for long-term psychiatric care today, 3/15/23, due to the complexity, chronicity, and severity of your psychiatric concerns.  It is my opinion that your care will be more optimally managed by a long-term psychiatric prescriber versus returning your psychiatric care back to your primary care provider for ongoing management.   I have agreed to provide medication care for three months, or until you are established with your new long term psychiatrist, whichever comes first.  Your last day of [facility] care would be 3/15/23, regardless if established with new provider yet or not (unless other arrangements were discussed and agreed upon), per collaborative care guidelines. You verbally agreed to this recommended plan today.  The intake team will call you to schedule your  long term psychiatry intake within 5 business days. Call [phone] if you are not called to schedule by next week.  Send me a message with the date/time/location of your long term psychiatry intake to keep me in the loop.  As discussed during your appointment, I am agreeable to bridging mental health medication care up to 3 months only, so please make sure you keep any scheduled appointments.  If you have not scheduled with a long-term provider within 3 months of today's referral date, your care will be returned to your primary care provider at that time.

## 2023-03-15 NOTE — PROGRESS NOTES
[facility]  March 15, 2023      Behavioral Health Clinician Progress Note    Patient Name: Jessica Rubio          Service Type:  Individual      [place]   [facility] [place] (patient reached)     Session Start Time: 10:00 am  Session End Time: 10:25 am      Session Length: 16 - 37      Attendees: Patient     Service Modality:  Video Visit:      Provider verified identity through the following two step process.  Patient provided:  Patient was verified at admission/transfer    Telemedicine Visit: The patient's condition can be safely assessed and treated via synchronous audio and visual telemedicine encounter.      Reason for Telemedicine Visit: Services only offered telehealth    Originating Site (Patient Location): Patient's home    Distant Site (Provider Location): Provider Remote Setting- Home Office    Consent:  The patient/guardian has verbally consented to: the potential risks and benefits of telemedicine (video visit) versus in person care; bill my insurance or make self-payment for services provided; and responsibility for payment of non-covered services.     Patient would like the video invitation sent by:  My Chart    Mode of Communication:  Video Conference via [employer]    Distant Location (Provider):  Off-site    As the provider I attest to compliance with applicable laws and regulations related to telemedicine.    Visit Activities (Refresh list every visit): NEW and [facility] Only    Diagnostic [place]: [place] [facility] [place] Plan Review Date: due by 3rd visit  See Flowsheets for today's PHQ-9 and BYRON-7 results  Previous PHQ-9:   PHQ-9 SCORE 2/8/2023 2/8/2023 3/7/2023   PHQ-9 Total Score MyChart 20 (Severe depression) 21 (Severe depression) 18 (Moderately severe depression)   PHQ-9 Total Score 21 20 18     Previous BYRON-7:   BYRON-7 SCORE 2/8/2023 2/8/2023 3/6/2023   Total Score 17 (severe anxiety) 16 (severe anxiety) 19  "(severe anxiety)   Total Score 16 17 19       SALINA LEVEL:  No flowsheet data found.    DATA  Extended Session (60+ minutes): No  Interactive Complexity: No  Crisis: No  [facility] Patient: No    Treatment Objective(s) Addressed in This Session:  Target Behavior(s): depression/anger    Depressed Mood: Increase interest, engagement, and pleasure in doing things  Feel less tired and more energy during the day     Current Stressors / Issues:  [facility] and was advised of the short-term, team based structure of the model including role of [facility] and provider. Patient indicated understanding of the model and agreed to proceed with services as described.    **Wanting long term psychiatry. Knows she needs long term support.  [facility] indicated the medication provider can put in a referral for this.    Wanting help with better managing symptoms. Narcolepsy has impact on life and mental health. Not had positive experiences with sleep medicine. Has seen 4 providers in the past; [place], [email or web address] and [facility]. Dx last year but has had symptoms for almost 10 years.    Not able to function on a daily basis. \"No quality of life.\" Brain fog and hard time being able to remember things. Works full time doing data review. Did have to take time off (FMLA) to find treatments. Has been \"up and down\" at work. Transitions in company and short staffed. Needing to cover for others and causing increased stress. Feels tired all day, mind wanders, not able to focus, memory issues. Trying to use strategies and needing to double check self. Follows the same routine/system to stay on track. Anything new or extra work causes disruption. Keeps notes and lists for self. Not getting negative feedback from peers or supervisors. Supervisor is aware and supportive.  Patient feels that her current skills are working but are not always pragmatic and that she should be able to adjust as needed.  She is able to flex schedule " and take breaks as needed.  Not able to complete tasks at home; keeping up on housework and laundry. Not able to go grocery shopping due to not having enough energy. Needing to prioritize the minimum. No social life. Tries to get out a couple times a month. Friendly with neighbors and tries to connect with them, more to do in the summer. Misses being active and wants to do things but hard to follow through or needs to recover physically for 2-7 days after. Sleeps a lot when not working or taking care of animals. Some contact with family but not a lot.    Sleep: sleeps all the time  Appetite: decreased with using stimulant, forces self to eat sometimes. Always had a low appetite.    SH: denies current or past  SI: denies current. Feels safe. Aware of crisis resources. CSSRS completed 03/07/2023.    Review of Symptoms per patient report:   Depression: Change in sleep, Lack of interest, Change in energy level, Change in appetite, Feelings of hopelessness, Ruminations, Irritability, Feeling sad, down, or depressed, Withdrawn, Poor hygeine, Frequent crying and Anger outbursts  Miryam:  No Symptoms Fam hx Bipolar: denies  Psychosis: No Symptoms  Anxiety: Excessive worry, Nervousness, Physical complaints, such as headaches, stomachaches, muscle tension, Social anxiety, Sleep disturbance, Psychomotor agitation, Ruminations, Poor concentration and Irritability  Panic:  Shortness of breath and overwhelmed, hyperventilate last month when off medication, not since then  Post Traumatic Stress Disorder:  No Symptoms   Eating Disorder: No Symptoms  ADD / ADHD:  No symptoms  Conduct Disorder: No symptoms  Autism Spectrum Disorder: No symptoms  Obsessive Compulsive Disorder: No Symptoms    Patient reports the following compulsive behaviors and treatment history: None.      [place]: [facility] [place] [place]  Patient reports she saw her once and does not plan to see her again.  Patient does not feel that therapists have been understanding of  her situation and needs. Psychiatric consult last year ([place]). Told to check back as needed but unable to schedule with them again.  No therapy or psychiatric interventions prior to this.  [facility] provided education about the role of health psychology or health therapy and indicated this could be a better fit for the patient.  [facility] agreed to send resources through [employer]    Etoh: once every other week, 1-2 drinks  Substance: marijuana once every couple of months, smokes a one-hitter  Nicotine: denies  Caffeine: used to drink coffee every morning but switched to tea a month ago. 2 cups of coffee every week. Green tea every couple of days.    Most Important: medications to help with mood, narcolepsy.    Progress on Treatment Objective(s) / Homework:  N/A-session spent establishing rapport and assessing for need    Motivational Interviewing    MI Intervention: Expressed Empathy/Understanding, Supported Autonomy, Collaboration, Evocation, Reflections: simple and complex and Reframe     Change Talk Expressed by the Patient: Reasons to change Need to change    Provider Response to Change Talk: E - Evoked more info from patient about behavior change and A - Affirmed patient's thoughts, decisions, or attempts at behavior change    Also provided psychoeducation about behavioral health condition, symptoms, and treatment options    Care Plan review completed: No    Medication Review:  Changes to psychiatric medications, see updated Medication List in EPIC.     Medication Compliance:  Yes Patient restarted on her medication a month ago    Changes in Health Issues:   Yes: Chronic disease management, Associated Psychological Distress    Chemical Use Review:   Substance Use: Chemical use reviewed, no active concerns identified      Tobacco Use: No current tobacco use.      Assessment: Current Emotional / Mental Status (status of significant symptoms):  Risk status (Self / Other harm or suicidal ideation)  Patient  denies a history of suicidal ideation, suicide attempts, self-injurious behavior, homicidal ideation, homicidal behavior and and other safety concerns  Patient denies current fears or concerns for personal safety.  Patient denies current or recent suicidal ideation or behaviors.  Patient denies current or recent homicidal ideation or behaviors.  Patient denies current or recent self injurious behavior or ideation.  Patient denies other safety concerns.  A safety and risk management plan has not been developed at this time, however patient was encouraged to call [place] [phone] should there be a change in any of these risk factors.    Appearance:   Appropriate   Eye Contact:   Good   Psychomotor Behavior: Normal   Attitude:   Cooperative   Orientation:   All  Speech   Rate / Production: Normal    Volume:  Normal   Mood:    Depressed  Irritable   Affect:    Appropriate   Thought Content:  Clear   Thought Form:  Coherent  Logical   Insight:    Fair     Diagnoses:  1. Current moderate episode of major depressive disorder without prior episode (H)      Collateral Reports Completed:  Communicated with: [facility] Team    Plan: (Homework, other):  Patient was given information about behavioral services and encouraged to schedule a follow up appointment with the clinic [facility] as needed.  She was also given information about mental health symptoms and treatment options .  CD Recommendations: No indications of CD issues.     Cherelle Vivar, PARVEZSW  March 15, 2023

## 2023-03-15 NOTE — NURSING NOTE
Is the patient currently in the state of [place]? YES    Visit mode:VIDEO    If the visit is dropped, the patient can be reconnected by: VIDEO VISIT: Text to cell phone: [phone]    Will anyone else be joining the visit? NO      How would you like to obtain your AVS? MyChart    Are changes needed to the allergy or medication list? NO    Reason for visit: new patient    Danielle PITTS

## 2023-03-18 NOTE — PROGRESS NOTES
Chief Complaint - Nasal obstruction    History of Present Illness - Jessica Mello is a 42 year old female who presents for evaluation of nasal obstruction. The patient describes symptoms of chronic nasal congestion, poor sleep for the past many years (6-7 years maybe). The patient notes symptoms on both sides. The patient notes occasional sneezing. They have not been tested for allergies in the past. Treatments have included nasal irrigations, nasal steroids, and oral antihistamines. The treatments seem to only help a little. + history of nasal trauma. No prior history of nasal surgery. Prior history of smoking. No epistaxis. She takes adderall.     Tests personally reviewed today for this visit:   1.) BMP was normal 11/16/22  2.) CMP was normal 11/16/22    Past Medical History -   Patient Active Problem List   Diagnosis     Smoker     Other depression     Anxiety     Fatigue, unspecified type     Narcolepsy without cataplexy(347.00)     Major depression, recurrent (H)     Abnormal serum iron level       Current Medications -   Current Outpatient Medications:      amphetamine-dextroamphetamine (ADDERALL XR) 20 MG 24 hr capsule, Take 1 capsule (20 mg) by mouth daily, Disp: 30 capsule, Rfl: 0     escitalopram (LEXAPRO) 10 MG tablet, Take 0.5 tablets (5 mg) by mouth At Bedtime for 7 days, THEN 1 tablet (10 mg) At Bedtime for 30 days., Disp: 34 tablet, Rfl: 0     fluticasone (FLONASE) 50 MCG/ACT nasal spray, Spray 1 spray into both nostrils daily (Patient not taking: Reported on 3/15/2023), Disp: 16 g, Rfl: 5     gabapentin (NEURONTIN) 300 MG capsule, Take 2 capsules (600 mg) by mouth At Bedtime May increase to 3 capsules at HS after one week if desired., Disp: 270 capsule, Rfl: 1     hydrOXYzine (ATARAX) 25 MG tablet, Take 1-2 tablets (25-50 mg) by mouth 3 times daily as needed for other (sleep/anxiety), Disp: 60 tablet, Rfl: 1    Allergies -   Allergies   Allergen Reactions     Bupropion Unknown     Modafinil  Unknown     Penicillins Hives       Social History -   Social History     Socioeconomic History     Marital status: Single   Tobacco Use     Smoking status: Former     Packs/day: 0.10     Years: 20.00     Pack years: 2.00     Types: Cigarettes     Start date: 1998     Quit date: 2022     Years since quittin.7     Smokeless tobacco: Never     Tobacco comments:     used to promote wakefulness   Vaping Use     Vaping Use: Never used   Substance and Sexual Activity     Alcohol use: Yes     Comment: 3-4 drinks per week.     Drug use: Yes     Types: Marijuana     Comment: intermittent use for pain/sleep- few times a month     Sexual activity: Not Currently     Birth control/protection: None   Other Topics Concern     Parent/sibling w/ CABG, MI or angioplasty before 65F 55M? No     Social Determinants of Health     Financial Resource Strain: Low Risk      Difficulty of Paying Living Expenses: Not very hard   Food Insecurity: No Food Insecurity     Worried About Running Out of Food in the Last Year: Never true     Ran Out of Food in the Last Year: Never true   Transportation Needs: No Transportation Needs     Lack of Transportation (Medical): No     Lack of Transportation (Non-Medical): No       Family History -   Family History   Problem Relation Age of Onset     Hypertension Mother      Breast Cancer Mother 65     Hyperlipidemia Father      Depression Sister      Anxiety Disorder Sister      Thyroid Disease Sister      Narcolepsy Sister      Thyroid Disease Maternal Grandmother      Depression Sister      Attention Deficit Disorder Sister      Colon Cancer Paternal Uncle         dx after age 50       Physical Exam  General - The patient is in no distress. Alert and oriented to person and place, answers questions and cooperates with examination appropriately.   Neurologic - CN II-XII are grossly intact. No focal neurologic deficits.   Voice and Breathing - The patient was breathing comfortably without the use  of accessory muscles. There was no wheezing, stridor, or stertor.  The patients voice was clear and strong.  Eyes - Extraocular movements intact. Sclera were not icteric or injected, conjunctiva were pink and moist.  Mouth - Examination of the oral cavity showed pink, healthy oral mucosa. No lesions or ulcerations noted.  The tongue was mobile and midline, and the dentition were in good condition.    Throat - The walls of the oropharynx were smooth, pink, moist, symmetric, and had no lesions or ulcerations.  The tonsillar pillars and soft palate were symmetric.  The uvula was midline on elevation.  Neck -  Soft, non-tender. Palpation of the occipital, submental, submandibular, internal jugular chain, and supraclavicular nodes did not demonstrate any abnormal lymph nodes or masses. No parotid masses. Palpation of the thyroid was soft and smooth, with no nodules or goiter appreciated.  The trachea was mobile and midline.  Nose - External contour is symmetric, no gross deflection or scars.  Nasal mucosa is pink and moist with clear mucus. However the turbinates are hypertrophic and edematous. The septum was deviated to the left.  No polyps, masses, or purulence noted on examination.    NASAL ENDOSCOPY -     Anterior rhinoscopy was insufficient to account for the patient's symptoms, so I performed flexible nasal endoscopy, and color photographs were taken for the permanent medical record.  I first sprayed both sides of the nasal cavity with a mix of lidocaine and neosynephrine.  I then began on the left side using an adult flexible fiberoptic endoscope.  The septum was deviated to the left. The inferior turbinate was hypertrophic as was the middle turbinate obstructing the middle meatus. No abnormal secretions, purulence, or polyps were noted. The superior meatus was normal. Going further back, the sphenoethmoid recess was normal in appearance, with healthy appearing mucosa on the face of the sphenoid.  The nasopharynx  was unremarkable, and the eustachian tube opening on this side was unobstructed.    I then turned my attention to the right side. The inferior turbinate was hypertrophic. No abnormal secretions, purulence, polyps were noted.  The left middle turbinate and middle meatus were also edematous, but no pus.  Looking up, the superior meatus was normal. Going further back the left sphenoethmoid recess was normal in appearance, and eustachian tube opening was unobstructed.                                  A/P - Jessica Mello is a 42 year old female with nasal obstruction due to turbinate edema and hypertrophy, and septal deviation. I recommend treatment with flonase and saline irrigations. The patient may benefit from allergy testing and treatment (possibly immunotherapy), referral provided. If medical management fails the next steps may include nasal surgery in the form of septoplasty with inferior turbinate reduction and out-fracture.    Brooks Hale MD  Otolaryngology  [facility]

## 2023-03-21 ENCOUNTER — OFFICE VISIT (OUTPATIENT)
Dept: OTOLARYNGOLOGY | Facility: CLINIC | Age: 43
End: 2023-03-21
Payer: COMMERCIAL

## 2023-03-21 VITALS
DIASTOLIC BLOOD PRESSURE: 83 MMHG | SYSTOLIC BLOOD PRESSURE: 129 MMHG | OXYGEN SATURATION: 100 % | RESPIRATION RATE: 16 BRPM | HEART RATE: 114 BPM

## 2023-03-21 DIAGNOSIS — J34.89 NASAL OBSTRUCTION: ICD-10-CM

## 2023-03-21 DIAGNOSIS — J30.89 NON-SEASONAL ALLERGIC RHINITIS, UNSPECIFIED TRIGGER: Primary | ICD-10-CM

## 2023-03-21 DIAGNOSIS — R09.81 NASAL CONGESTION: ICD-10-CM

## 2023-03-21 PROCEDURE — 31231 NASAL ENDOSCOPY DX: CPT | Performed by: OTOLARYNGOLOGY

## 2023-03-21 PROCEDURE — 99203 OFFICE O/P NEW LOW 30 MIN: CPT | Mod: 25 | Performed by: OTOLARYNGOLOGY

## 2023-03-21 NOTE — PATIENT INSTRUCTIONS
- use flonase 2 sprays each side daily  - use nasal saline irrigations daily  - get allergy tested  - consider septoplasty and inferior turbinate reduction surgery if everything fails

## 2023-03-21 NOTE — LETTER
3/21/2023         RE: Jessica Mello  [address]  [place] [address]        Dear Colleague,    Thank you for referring your patient, Jessica Mello, to the [facility]. Please see a copy of my visit note below.    Chief Complaint - Nasal obstruction    History of Present Illness - Jessica Mello is a 42 year old female who presents for evaluation of nasal obstruction. The patient describes symptoms of chronic nasal congestion, poor sleep for the past many years (6-7 years maybe). The patient notes symptoms on both sides. The patient notes occasional sneezing. They have not been tested for allergies in the past. Treatments have included nasal irrigations, nasal steroids, and oral antihistamines. The treatments seem to only help a little. + history of nasal trauma. No prior history of nasal surgery. Prior history of smoking. No epistaxis. She takes adderall.     Tests personally reviewed today for this visit:   1.) BMP was normal 11/16/22  2.) CMP was normal 11/16/22    Past Medical History -   Patient Active Problem List   Diagnosis     Smoker     Other depression     Anxiety     Fatigue, unspecified type     Narcolepsy without cataplexy(347.00)     Major depression, recurrent (H)     Abnormal serum iron level       Current Medications -   Current Outpatient Medications:      amphetamine-dextroamphetamine (ADDERALL XR) 20 MG 24 hr capsule, Take 1 capsule (20 mg) by mouth daily, Disp: 30 capsule, Rfl: 0     escitalopram (LEXAPRO) 10 MG tablet, Take 0.5 tablets (5 mg) by mouth At Bedtime for 7 days, THEN 1 tablet (10 mg) At Bedtime for 30 days., Disp: 34 tablet, Rfl: 0     fluticasone (FLONASE) 50 MCG/ACT nasal spray, Spray 1 spray into both nostrils daily (Patient not taking: Reported on 3/15/2023), Disp: 16 g, Rfl: 5     gabapentin (NEURONTIN) 300 MG capsule, Take 2 capsules (600 mg) by mouth At Bedtime May increase to 3 capsules at HS after one week if desired.,  Disp: 270 capsule, Rfl: 1     hydrOXYzine (ATARAX) 25 MG tablet, Take 1-2 tablets (25-50 mg) by mouth 3 times daily as needed for other (sleep/anxiety), Disp: 60 tablet, Rfl: 1    Allergies -   Allergies   Allergen Reactions     Bupropion Unknown     Modafinil Unknown     Penicillins Hives       Social History -   Social History     Socioeconomic History     Marital status: Single   Tobacco Use     Smoking status: Former     Packs/day: 0.10     Years: 20.00     Pack years: 2.00     Types: Cigarettes     Start date: 1998     Quit date: 2022     Years since quittin.7     Smokeless tobacco: Never     Tobacco comments:     used to promote wakefulness   Vaping Use     Vaping Use: Never used   Substance and Sexual Activity     Alcohol use: Yes     Comment: 3-4 drinks per week.     Drug use: Yes     Types: Marijuana     Comment: intermittent use for pain/sleep- few times a month     Sexual activity: Not Currently     Birth control/protection: None   Other Topics Concern     Parent/sibling w/ CABG, MI or angioplasty before 65F 55M? No     Social Determinants of Health     Financial Resource Strain: Low Risk      Difficulty of Paying Living Expenses: Not very hard   Food Insecurity: No Food Insecurity     Worried About Running Out of Food in the Last Year: Never true     Ran Out of Food in the Last Year: Never true   Transportation Needs: No Transportation Needs     Lack of Transportation (Medical): No     Lack of Transportation (Non-Medical): No       Family History -   Family History   Problem Relation Age of Onset     Hypertension Mother      Breast Cancer Mother 65     Hyperlipidemia Father      Depression Sister      Anxiety Disorder Sister      Thyroid Disease Sister      Narcolepsy Sister      Thyroid Disease Maternal Grandmother      Depression Sister      Attention Deficit Disorder Sister      Colon Cancer Paternal Uncle         dx after age 50       Physical Exam  General - The patient is in no  distress. Alert and oriented to person and place, answers questions and cooperates with examination appropriately.   Neurologic - CN II-XII are grossly intact. No focal neurologic deficits.   Voice and Breathing - The patient was breathing comfortably without the use of accessory muscles. There was no wheezing, stridor, or stertor.  The patients voice was clear and strong.  Eyes - Extraocular movements intact. Sclera were not icteric or injected, conjunctiva were pink and moist.  Mouth - Examination of the oral cavity showed pink, healthy oral mucosa. No lesions or ulcerations noted.  The tongue was mobile and midline, and the dentition were in good condition.    Throat - The walls of the oropharynx were smooth, pink, moist, symmetric, and had no lesions or ulcerations.  The tonsillar pillars and soft palate were symmetric.  The uvula was midline on elevation.  Neck -  Soft, non-tender. Palpation of the occipital, submental, submandibular, internal jugular chain, and supraclavicular nodes did not demonstrate any abnormal lymph nodes or masses. No parotid masses. Palpation of the thyroid was soft and smooth, with no nodules or goiter appreciated.  The trachea was mobile and midline.  Nose - External contour is symmetric, no gross deflection or scars.  Nasal mucosa is pink and moist with clear mucus. However the turbinates are hypertrophic and edematous. The septum was deviated to the left.  No polyps, masses, or purulence noted on examination.    NASAL ENDOSCOPY -     Anterior rhinoscopy was insufficient to account for the patient's symptoms, so I performed flexible nasal endoscopy, and color photographs were taken for the permanent medical record.  I first sprayed both sides of the nasal cavity with a mix of lidocaine and neosynephrine.  I then began on the left side using an adult flexible fiberoptic endoscope.  The septum was deviated to the left. The inferior turbinate was hypertrophic as was the middle turbinate  obstructing the middle meatus. No abnormal secretions, purulence, or polyps were noted. The superior meatus was normal. Going further back, the sphenoethmoid recess was normal in appearance, with healthy appearing mucosa on the face of the sphenoid.  The nasopharynx was unremarkable, and the eustachian tube opening on this side was unobstructed.    I then turned my attention to the right side. The inferior turbinate was hypertrophic. No abnormal secretions, purulence, polyps were noted.  The left middle turbinate and middle meatus were also edematous, but no pus.  Looking up, the superior meatus was normal. Going further back the left sphenoethmoid recess was normal in appearance, and eustachian tube opening was unobstructed.                                  A/P - Jessica Mello is a 42 year old female with nasal obstruction due to turbinate edema and hypertrophy, and septal deviation. I recommend treatment with flonase and saline irrigations. The patient may benefit from allergy testing and treatment (possibly immunotherapy), referral provided. If medical management fails the next steps may include nasal surgery in the form of septoplasty with inferior turbinate reduction and out-fracture.    Brooks Hale MD  Otolaryngology  [facility]        Again, thank you for allowing me to participate in the care of your patient.        Sincerely,        Brooks Hale MD

## 2023-04-09 ENCOUNTER — MYC REFILL (OUTPATIENT)
Dept: FAMILY MEDICINE | Facility: CLINIC | Age: 43
End: 2023-04-09
Payer: COMMERCIAL

## 2023-04-09 DIAGNOSIS — G47.419 NARCOLEPSY WITHOUT CATAPLEXY(347.00): ICD-10-CM

## 2023-04-10 RX ORDER — DEXTROAMPHETAMINE SACCHARATE, AMPHETAMINE ASPARTATE MONOHYDRATE, DEXTROAMPHETAMINE SULFATE AND AMPHETAMINE SULFATE 5; 5; 5; 5 MG/1; MG/1; MG/1; MG/1
20 CAPSULE, EXTENDED RELEASE ORAL DAILY
Qty: 30 CAPSULE | Refills: 0 | Status: SHIPPED | OUTPATIENT
Start: 2023-04-10 | End: 2023-05-14

## 2023-04-12 ENCOUNTER — APPOINTMENT (OUTPATIENT)
Dept: URBAN - METROPOLITAN AREA CLINIC 259 | Age: 43
Setting detail: DERMATOLOGY
End: 2023-04-21

## 2023-04-12 DIAGNOSIS — D17 BENIGN LIPOMATOUS NEOPLASM: ICD-10-CM

## 2023-04-12 PROBLEM — D17.23 BENIGN LIPOMATOUS NEOPLASM OF SKIN AND SUBCUTANEOUS TISSUE OF RIGHT LEG: Status: ACTIVE | Noted: 2023-04-12

## 2023-04-12 PROCEDURE — OTHER EXCISION: OTHER

## 2023-04-12 PROCEDURE — 11406 EXC TR-EXT B9+MARG >4.0 CM: CPT

## 2023-04-12 PROCEDURE — 13121 CMPLX RPR S/A/L 2.6-7.5 CM: CPT

## 2023-04-12 PROCEDURE — OTHER PRESCRIPTION: OTHER

## 2023-04-12 PROCEDURE — OTHER MIPS QUALITY: OTHER

## 2023-04-12 RX ORDER — SULFAMETHOXAZOLE AND TRIMETHOPRIM 800; 160 MG/1; MG/1
TABLET ORAL
Qty: 10 | Refills: 0 | Status: ERX | COMMUNITY
Start: 2023-04-12

## 2023-04-12 ASSESSMENT — LOCATION SIMPLE DESCRIPTION DERM: LOCATION SIMPLE: RIGHT THIGH

## 2023-04-12 ASSESSMENT — LOCATION ZONE DERM: LOCATION ZONE: LEG

## 2023-04-12 ASSESSMENT — LOCATION DETAILED DESCRIPTION DERM: LOCATION DETAILED: RIGHT ANTERIOR PROXIMAL THIGH

## 2023-04-12 NOTE — PROCEDURE: MIPS QUALITY
Quality 130: Documentation Of Current Medications In The Medical Record: Current Medications Documented
Quality 226: Preventive Care And Screening: Tobacco Use: Screening And Cessation Intervention: Patient screened for tobacco use and is an ex/non-smoker
Quality 431: Preventive Care And Screening: Unhealthy Alcohol Use - Screening: Patient not identified as an unhealthy alcohol user when screened for unhealthy alcohol use using a systematic screening method
Detail Level: Detailed
Quality 110: Preventive Care And Screening: Influenza Immunization: Influenza Immunization not Administered because Patient Refused.
Quality 265: Biopsy Follow-Up: Biopsy results reviewed, communicated, tracked, and documented

## 2023-04-12 NOTE — PROCEDURE: EXCISION
Retention Suture Bite Size: 3 mm
Lip Wedge Excision Repair Text: Given the location of the defect and the proximity to free margins a full thickness wedge repair was deemed most appropriate.  Using a sterile surgical marker, the appropriate repair was drawn incorporating the defect and placing the expected incisions perpendicular to the vermilion border.  The vermilion border was also meticulously outlined to ensure appropriate reapproximation during the repair.  The area thus outlined was incised through and through with a #15 scalpel blade.  The muscularis and dermis were reaproximated with deep sutures following hemostasis. Care was taken to realign the vermilion border before proceeding with the superficial closure.  Once the vermilion was realigned the superfical and mucosal closure was finished.
Nasalis-Muscle-Based Myocutaneous Island Pedicle Flap Text: Using a #15 blade, an incision was made around the donor flap to the level of the nasalis muscle. Wide lateral undermining was then performed in both the subcutaneous plane above the nasalis muscle, and in a submuscular plane just above periosteum. This allowed the formation of a free nasalis muscle axial pedicle (based on the angular artery) which was still attached to the actual cutaneous flap, increasing its mobility and vascular viability. Hemostasis was obtained with pinpoint electrocoagulation. The flap was mobilized into position and the pivotal anchor points positioned and stabilized with buried interrupted sutures. Subcutaneous and dermal tissues were closed in a multilayered fashion with sutures. Tissue redundancies were excised, and the epidermal edges were apposed without significant tension and sutured with sutures.
Saucerization Excision Additional Text (Leave Blank If You Do Not Want): The margin was drawn around the clinically apparent lesion.  Incisions were then made along these lines, in a tangential fashion, to the appropriate tissue plane and the lesion was extirpated.
Bilobed Transposition Flap Text: The defect edges were debeveled with a #15 scalpel blade.  Given the location of the defect and the proximity to free margins a bilobed transposition flap was deemed most appropriate.  Using a sterile surgical marker, an appropriate bilobe flap drawn around the defect.    The area thus outlined was incised deep to adipose tissue with a #15 scalpel blade.  The skin margins were undermined to an appropriate distance in all directions utilizing iris scissors.
Hemigard Postcare Instructions: The HEMIGARD strips are to remain completely dry for at least 5-7 days.
Secondary Defect Length (In Cm): 0
Spiral Flap Text: The defect edges were debeveled with a #15 scalpel blade.  Given the location of the defect, shape of the defect and the proximity to free margins a spiral flap was deemed most appropriate.  Using a sterile surgical marker, an appropriate rotation flap was drawn incorporating the defect and placing the expected incisions within the relaxed skin tension lines where possible. The area thus outlined was incised deep to adipose tissue with a #15 scalpel blade.  The skin margins were undermined to an appropriate distance in all directions utilizing iris scissors.
Double M-Plasty Complex Repair Preamble Text (Leave Blank If You Do Not Want): Extensive wide undermining was performed.
Complex Repair And Split-Thickness Skin Graft Text: The defect edges were debeveled with a #15 scalpel blade.  The primary defect was closed partially with a complex linear closure.  Given the location of the defect, shape of the defect and the proximity to free margins a split thickness skin graft was deemed most appropriate to repair the remaining defect.  The graft was trimmed to fit the size of the remaining defect.  The graft was then placed in the primary defect, oriented appropriately, and sutured into place.
Pathology Comment (Optional): Please check margins.
Rhomboid Transposition Flap Text: The defect edges were debeveled with a #15 scalpel blade.  Given the location of the defect and the proximity to free margins a rhomboid transposition flap was deemed most appropriate.  Using a sterile surgical marker, an appropriate rhomboid flap was drawn incorporating the defect.    The area thus outlined was incised deep to adipose tissue with a #15 scalpel blade.  The skin margins were undermined to an appropriate distance in all directions utilizing iris scissors.
Show Accession Variable: Yes
X Size Of Lesion In Cm (Optional): 2.5
Complex Repair And Rhombic Flap Text: The defect edges were debeveled with a #15 scalpel blade.  The primary defect was closed partially with a complex linear closure.  Given the location of the remaining defect, shape of the defect and the proximity to free margins a rhombic flap was deemed most appropriate for complete closure of the defect.  Using a sterile surgical marker, an appropriate advancement flap was drawn incorporating the defect and placing the expected incisions within the relaxed skin tension lines where possible.    The area thus outlined was incised deep to adipose tissue with a #15 scalpel blade.  The skin margins were undermined to an appropriate distance in all directions utilizing iris scissors.
O-T Advancement Flap Text: The defect edges were debeveled with a #15 scalpel blade.  Given the location of the defect, shape of the defect and the proximity to free margins an O-T advancement flap was deemed most appropriate.  Using a sterile surgical marker, an appropriate advancement flap was drawn incorporating the defect and placing the expected incisions within the relaxed skin tension lines where possible.    The area thus outlined was incised deep to adipose tissue with a #15 scalpel blade.  The skin margins were undermined to an appropriate distance in all directions utilizing iris scissors.
Deep Sutures: 5-0 PGLC
Dermal Autograft Text: The defect edges were debeveled with a #15 scalpel blade.  Given the location of the defect, shape of the defect and the proximity to free margins a dermal autograft was deemed most appropriate.  Using a sterile surgical marker, the primary defect shape was transferred to the donor site. The area thus outlined was incised deep to adipose tissue with a #15 scalpel blade.  The harvested graft was then trimmed of adipose and epidermal tissue until only dermis was left.  The skin graft was then placed in the primary defect and oriented appropriately.
Vermilion Border Text: The closure involved the vermilion border.
Complex Repair And Modified Advancement Flap Text: The defect edges were debeveled with a #15 scalpel blade.  The primary defect was closed partially with a complex linear closure.  Given the location of the remaining defect, shape of the defect and the proximity to free margins a modified advancement flap was deemed most appropriate for complete closure of the defect.  Using a sterile surgical marker, an appropriate advancement flap was drawn incorporating the defect and placing the expected incisions within the relaxed skin tension lines where possible.    The area thus outlined was incised deep to adipose tissue with a #15 scalpel blade.  The skin margins were undermined to an appropriate distance in all directions utilizing iris scissors.
Adjacent Tissue Transfer Text: The defect edges were debeveled with a #15 scalpel blade.  Given the location of the defect and the proximity to free margins an adjacent tissue transfer was deemed most appropriate.  Using a sterile surgical marker, an appropriate flap was drawn incorporating the defect and placing the expected incisions within the relaxed skin tension lines where possible.    The area thus outlined was incised deep to adipose tissue with a #15 scalpel blade.  The skin margins were undermined to an appropriate distance in all directions utilizing iris scissors.
Island Pedicle Flap-Requiring Vessel Identification Text: The defect edges were debeveled with a #15 scalpel blade.  Given the location of the defect, shape of the defect and the proximity to free margins an island pedicle advancement flap was deemed most appropriate.  Using a sterile surgical marker, an appropriate advancement flap was drawn, based on the axial vessel mentioned above, incorporating the defect, outlining the appropriate donor tissue and placing the expected incisions within the relaxed skin tension lines where possible.    The area thus outlined was incised deep to adipose tissue with a #15 scalpel blade.  The skin margins were undermined to an appropriate distance in all directions around the primary defect and laterally outward around the island pedicle utilizing iris scissors.  There was minimal undermining beneath the pedicle flap.
Purse String (Intermediate) Text: Given the location of the defect and the characteristics of the surrounding skin a purse string intermediate closure was deemed most appropriate.  Undermining was performed circumferentially around the surgical defect.  A purse string suture was then placed and tightened.
Perilesional Excision Additional Text (Leave Blank If You Do Not Want): The margin was drawn around the clinically apparent lesion. Incisions were then made along these lines to the appropriate tissue plane and the lesion was extirpated.
Island Pedicle Flap Text: The defect edges were debeveled with a #15 scalpel blade.  Given the location of the defect, shape of the defect and the proximity to free margins an island pedicle advancement flap was deemed most appropriate.  Using a sterile surgical marker, an appropriate advancement flap was drawn incorporating the defect, outlining the appropriate donor tissue and placing the expected incisions within the relaxed skin tension lines where possible.    The area thus outlined was incised deep to adipose tissue with a #15 scalpel blade.  The skin margins were undermined to an appropriate distance in all directions around the primary defect and laterally outward around the island pedicle utilizing iris scissors.  There was minimal undermining beneath the pedicle flap.
Estimated Blood Loss (Cc): 2 cc
Where Do You Want The Question To Include Opioid Counseling Located?: Case Summary Tab
Complex Repair And Tissue Cultured Epidermal Autograft Text: The defect edges were debeveled with a #15 scalpel blade.  The primary defect was closed partially with a complex linear closure.  Given the location of the defect, shape of the defect and the proximity to free margins an tissue cultured epidermal autograft was deemed most appropriate to repair the remaining defect.  The graft was trimmed to fit the size of the remaining defect.  The graft was then placed in the primary defect, oriented appropriately, and sutured into place.
M-Plasty Intermediate Repair Preamble Text (Leave Blank If You Do Not Want): Undermining was performed with blunt dissection.
Surgeon (Optional): Leilani Bass, MPhil, MD
Bilateral Helical Rim Advancement Flap Text: The defect edges were debeveled with a #15 blade scalpel.  Given the location of the defect and the proximity to free margins (helical rim) a bilateral helical rim advancement flap was deemed most appropriate.  Using a sterile surgical marker, the appropriate advancement flaps were drawn incorporating the defect and placing the expected incisions between the helical rim and antihelix where possible.  The area thus outlined was incised through and through with a #15 scalpel blade.  With a skin hook and iris scissors, the flaps were gently and sharply undermined and freed up.
Transposition Flap Text: The defect edges were debeveled with a #15 scalpel blade.  Given the location of the defect and the proximity to free margins a transposition flap was deemed most appropriate.  Using a sterile surgical marker, an appropriate transposition flap was drawn incorporating the defect.    The area thus outlined was incised deep to adipose tissue with a #15 scalpel blade.  The skin margins were undermined to an appropriate distance in all directions utilizing iris scissors.
Burow's Graft Text: The defect edges were debeveled with a #15 scalpel blade.  Given the location of the defect, shape of the defect, the proximity to free margins and the presence of a standing cone deformity a Burow's skin graft was deemed most appropriate. The standing cone was removed and this tissue was then trimmed to the shape of the primary defect. The adipose tissue was also removed until only dermis and epidermis were left.  The skin margins of the secondary defect were undermined to an appropriate distance in all directions utilizing iris scissors.  The secondary defect was closed with interrupted buried subcutaneous sutures.  The skin edges were then re-apposed with running  sutures.  The skin graft was then placed in the primary defect and oriented appropriately.
Width Of Defect Perpendicular To Closure In Cm (Required): 1
Did You Provide Opioid Counseling: No
Complex Repair And M Plasty Text: The defect edges were debeveled with a #15 scalpel blade.  The primary defect was closed partially with a complex linear closure.  Given the location of the remaining defect, shape of the defect and the proximity to free margins an M plasty was deemed most appropriate for complete closure of the defect.  Using a sterile surgical marker, an appropriate advancement flap was drawn incorporating the defect and placing the expected incisions within the relaxed skin tension lines where possible.    The area thus outlined was incised deep to adipose tissue with a #15 scalpel blade.  The skin margins were undermined to an appropriate distance in all directions utilizing iris scissors.
Double O-Z Flap Text: The defect edges were debeveled with a #15 scalpel blade.  Given the location of the defect, shape of the defect and the proximity to free margins a Double O-Z flap was deemed most appropriate.  Using a sterile surgical marker, an appropriate transposition flap was drawn incorporating the defect and placing the expected incisions within the relaxed skin tension lines where possible. The area thus outlined was incised deep to adipose tissue with a #15 scalpel blade.  The skin margins were undermined to an appropriate distance in all directions utilizing iris scissors.
Excision Method: Slit
Suture Removal: 14 days
Cheek Interpolation Flap Text: A decision was made to reconstruct the defect utilizing an interpolation axial flap and a staged reconstruction.  A telfa template was made of the defect.  This telfa template was then used to outline the Cheek Interpolation flap.  The donor area for the pedicle flap was then injected with anesthesia.  The flap was excised through the skin and subcutaneous tissue down to the layer of the underlying musculature.  The interpolation flap was carefully excised within this deep plane to maintain its blood supply.  The edges of the donor site were undermined.   The donor site was closed in a primary fashion.  The pedicle was then rotated into position and sutured.  Once the tube was sutured into place, adequate blood supply was confirmed with blanching and refill.  The pedicle was then wrapped with xeroform gauze and dressed appropriately with a telfa and gauze bandage to ensure continued blood supply and protect the attached pedicle.
Hemigard Retention Suture: 2-0 Nylon
Complex Repair And O-L Flap Text: The defect edges were debeveled with a #15 scalpel blade.  The primary defect was closed partially with a complex linear closure.  Given the location of the remaining defect, shape of the defect and the proximity to free margins an O-L flap was deemed most appropriate for complete closure of the defect.  Using a sterile surgical marker, an appropriate flap was drawn incorporating the defect and placing the expected incisions within the relaxed skin tension lines where possible.    The area thus outlined was incised deep to adipose tissue with a #15 scalpel blade.  The skin margins were undermined to an appropriate distance in all directions utilizing iris scissors.
Burow's Advancement Flap Text: The defect edges were debeveled with a #15 scalpel blade.  Given the location of the defect and the proximity to free margins a Burow's advancement flap was deemed most appropriate.  Using a sterile surgical marker, the appropriate advancement flap was drawn incorporating the defect and placing the expected incisions within the relaxed skin tension lines where possible.    The area thus outlined was incised deep to adipose tissue with a #15 scalpel blade.  The skin margins were undermined to an appropriate distance in all directions utilizing iris scissors.
O-Z Plasty Text: The defect edges were debeveled with a #15 scalpel blade.  Given the location of the defect, shape of the defect and the proximity to free margins an O-Z plasty (double transposition flap) was deemed most appropriate.  Using a sterile surgical marker, the appropriate transposition flaps were drawn incorporating the defect and placing the expected incisions within the relaxed skin tension lines where possible.    The area thus outlined was incised deep to adipose tissue with a #15 scalpel blade.  The skin margins were undermined to an appropriate distance in all directions utilizing iris scissors.  Hemostasis was achieved with electrocautery.  The flaps were then transposed into place, one clockwise and the other counterclockwise, and anchored with interrupted buried subcutaneous sutures.
Consent was obtained from the patient. The risks and benefits to therapy were discussed in detail. Specifically, the risks of infection, scarring, bleeding, prolonged wound healing, incomplete removal, allergy to anesthesia, nerve injury and recurrence were addressed. Prior to the procedure, the treatment site was clearly identified and confirmed by the patient. All components of Universal Protocol/PAUSE Rule completed.
Complex Repair And Double Advancement Flap Text: The defect edges were debeveled with a #15 scalpel blade.  The primary defect was closed partially with a complex linear closure.  Given the location of the remaining defect, shape of the defect and the proximity to free margins a double advancement flap was deemed most appropriate for complete closure of the defect.  Using a sterile surgical marker, an appropriate advancement flap was drawn incorporating the defect and placing the expected incisions within the relaxed skin tension lines where possible.    The area thus outlined was incised deep to adipose tissue with a #15 scalpel blade.  The skin margins were undermined to an appropriate distance in all directions utilizing iris scissors.
No Repair - Repaired With Adjacent Surgical Defect Text (Leave Blank If You Do Not Want): After the excision the defect was repaired concurrently with another surgical defect which was in close approximation.
Double Island Pedicle Flap Text: The defect edges were debeveled with a #15 scalpel blade.  Given the location of the defect, shape of the defect and the proximity to free margins a double island pedicle advancement flap was deemed most appropriate.  Using a sterile surgical marker, an appropriate advancement flap was drawn incorporating the defect, outlining the appropriate donor tissue and placing the expected incisions within the relaxed skin tension lines where possible.    The area thus outlined was incised deep to adipose tissue with a #15 scalpel blade.  The skin margins were undermined to an appropriate distance in all directions around the primary defect and laterally outward around the island pedicle utilizing iris scissors.  There was minimal undermining beneath the pedicle flap.
Nasal Turnover Hinge Flap Text: The defect edges were debeveled with a #15 scalpel blade.  Given the size, depth, location of the defect and the defect being full thickness a nasal turnover hinge flap was deemed most appropriate.  Using a sterile surgical marker, an appropriate hinge flap was drawn incorporating the defect. The area thus outlined was incised with a #15 scalpel blade. The flap was designed to recreate the nasal mucosal lining and the alar rim. The skin margins were undermined to an appropriate distance in all directions utilizing iris scissors.
Eliptical Excision Additional Text (Leave Blank If You Do Not Want): The margin was drawn around the clinically apparent lesion.  An elliptical shape was then drawn on the skin incorporating the lesion and margins.  Incisions were then made along these lines to the appropriate tissue plane and the lesion was extirpated.
Bilobed Flap Text: The defect edges were debeveled with a #15 scalpel blade.  Given the location of the defect and the proximity to free margins a bilobe flap was deemed most appropriate.  Using a sterile surgical marker, an appropriate bilobe flap drawn around the defect.    The area thus outlined was incised deep to adipose tissue with a #15 scalpel blade.  The skin margins were undermined to an appropriate distance in all directions utilizing iris scissors.
Epidermal Autograft Text: The defect edges were debeveled with a #15 scalpel blade.  Given the location of the defect, shape of the defect and the proximity to free margins an epidermal autograft was deemed most appropriate.  Using a sterile surgical marker, the primary defect shape was transferred to the donor site. The epidermal graft was then harvested.  The skin graft was then placed in the primary defect and oriented appropriately.
Suturegard Body: The suture ends were repeatedly re-tightened and re-clamped to achieve the desired tissue expansion.
Modified Advancement Flap Text: The defect edges were debeveled with a #15 scalpel blade.  Given the location of the defect, shape of the defect and the proximity to free margins a modified advancement flap was deemed most appropriate.  Using a sterile surgical marker, an appropriate advancement flap was drawn incorporating the defect and placing the expected incisions within the relaxed skin tension lines where possible.    The area thus outlined was incised deep to adipose tissue with a #15 scalpel blade.  The skin margins were undermined to an appropriate distance in all directions utilizing iris scissors.
Complex Repair And Z Plasty Text: The defect edges were debeveled with a #15 scalpel blade.  The primary defect was closed partially with a complex linear closure.  Given the location of the remaining defect, shape of the defect and the proximity to free margins a Z plasty was deemed most appropriate for complete closure of the defect.  Using a sterile surgical marker, an appropriate advancement flap was drawn incorporating the defect and placing the expected incisions within the relaxed skin tension lines where possible.    The area thus outlined was incised deep to adipose tissue with a #15 scalpel blade.  The skin margins were undermined to an appropriate distance in all directions utilizing iris scissors.
Information: Selecting Yes will display possible errors in your note based on the variables you have selected. This validation is only offered as a suggestion for you. PLEASE NOTE THAT THE VALIDATION TEXT WILL BE REMOVED WHEN YOU FINALIZE YOUR NOTE. IF YOU WANT TO FAX A PRELIMINARY NOTE YOU WILL NEED TO TOGGLE THIS TO 'NO' IF YOU DO NOT WANT IT IN YOUR FAXED NOTE.
Melolabial Interpolation Flap Text: A decision was made to reconstruct the defect utilizing an interpolation axial flap and a staged reconstruction.  A telfa template was made of the defect.  This telfa template was then used to outline the melolabial interpolation flap.  The donor area for the pedicle flap was then injected with anesthesia.  The flap was excised through the skin and subcutaneous tissue down to the layer of the underlying musculature.  The pedicle flap was carefully excised within this deep plane to maintain its blood supply.  The edges of the donor site were undermined.   The donor site was closed in a primary fashion.  The pedicle was then rotated into position and sutured.  Once the tube was sutured into place, adequate blood supply was confirmed with blanching and refill.  The pedicle was then wrapped with xeroform gauze and dressed appropriately with a telfa and gauze bandage to ensure continued blood supply and protect the attached pedicle.
Excision Depth: adipose tissue
A-T Advancement Flap Text: The defect edges were debeveled with a #15 scalpel blade.  Given the location of the defect, shape of the defect and the proximity to free margins an A-T advancement flap was deemed most appropriate.  Using a sterile surgical marker, an appropriate advancement flap was drawn incorporating the defect and placing the expected incisions within the relaxed skin tension lines where possible.    The area thus outlined was incised deep to adipose tissue with a #15 scalpel blade.  The skin margins were undermined to an appropriate distance in all directions utilizing iris scissors.
Size Of Lesion In Cm: 5
Complex Repair And Rotation Flap Text: The defect edges were debeveled with a #15 scalpel blade.  The primary defect was closed partially with a complex linear closure.  Given the location of the remaining defect, shape of the defect and the proximity to free margins a rotation flap was deemed most appropriate for complete closure of the defect.  Using a sterile surgical marker, an appropriate advancement flap was drawn incorporating the defect and placing the expected incisions within the relaxed skin tension lines where possible.    The area thus outlined was incised deep to adipose tissue with a #15 scalpel blade.  The skin margins were undermined to an appropriate distance in all directions utilizing iris scissors.
H Plasty Text: Given the location of the defect, shape of the defect and the proximity to free margins a H-plasty was deemed most appropriate for repair.  Using a sterile surgical marker, the appropriate advancement arms of the H-plasty were drawn incorporating the defect and placing the expected incisions within the relaxed skin tension lines where possible. The area thus outlined was incised deep to adipose tissue with a #15 scalpel blade. The skin margins were undermined to an appropriate distance in all directions utilizing iris scissors.  The opposing advancement arms were then advanced into place in opposite direction and anchored with interrupted buried subcutaneous sutures.
Complex Repair And O-T Advancement Flap Text: The defect edges were debeveled with a #15 scalpel blade.  The primary defect was closed partially with a complex linear closure.  Given the location of the remaining defect, shape of the defect and the proximity to free margins an O-T advancement flap was deemed most appropriate for complete closure of the defect.  Using a sterile surgical marker, an appropriate advancement flap was drawn incorporating the defect and placing the expected incisions within the relaxed skin tension lines where possible.    The area thus outlined was incised deep to adipose tissue with a #15 scalpel blade.  The skin margins were undermined to an appropriate distance in all directions utilizing iris scissors.
Advancement Flap (Double) Text: The defect edges were debeveled with a #15 scalpel blade.  Given the location of the defect and the proximity to free margins a double advancement flap was deemed most appropriate.  Using a sterile surgical marker, the appropriate advancement flaps were drawn incorporating the defect and placing the expected incisions within the relaxed skin tension lines where possible.    The area thus outlined was incised deep to adipose tissue with a #15 scalpel blade.  The skin margins were undermined to an appropriate distance in all directions utilizing iris scissors.
Length To Time In Minutes Device Was In Place: 10
O-T Plasty Text: The defect edges were debeveled with a #15 scalpel blade.  Given the location of the defect, shape of the defect and the proximity to free margins an O-T plasty was deemed most appropriate.  Using a sterile surgical marker, an appropriate O-T plasty was drawn incorporating the defect and placing the expected incisions within the relaxed skin tension lines where possible.    The area thus outlined was incised deep to adipose tissue with a #15 scalpel blade.  The skin margins were undermined to an appropriate distance in all directions utilizing iris scissors.
Xenograft Text: The defect edges were debeveled with a #15 scalpel blade.  Given the location of the defect, shape of the defect and the proximity to free margins a xenograft was deemed most appropriate.  The graft was then trimmed to fit the size of the defect.  The graft was then placed in the primary defect and oriented appropriately.
Excisional Biopsy Additional Text (Leave Blank If You Do Not Want): The margin was drawn around the clinically apparent lesion. An elliptical shape was then drawn on the skin incorporating the lesion and margins.  Incisions were then made along these lines to the appropriate tissue plane and the lesion was extirpated.
Dorsal Nasal Flap Text: The defect edges were debeveled with a #15 scalpel blade.  Given the location of the defect and the proximity to free margins a dorsal nasal flap was deemed most appropriate.  Using a sterile surgical marker, an appropriate dorsal nasal flap was drawn around the defect.    The area thus outlined was incised deep to adipose tissue with a #15 scalpel blade.  The skin margins were undermined to an appropriate distance in all directions utilizing iris scissors.
Nostril Rim Text: The closure involved the nostril rim.
Primary Defect Width (In Cm): 0.2
Rotation Flap Text: The defect edges were debeveled with a #15 scalpel blade.  Given the location of the defect, shape of the defect and the proximity to free margins a rotation flap was deemed most appropriate.  Using a sterile surgical marker, an appropriate rotation flap was drawn incorporating the defect and placing the expected incisions within the relaxed skin tension lines where possible.    The area thus outlined was incised deep to adipose tissue with a #15 scalpel blade.  The skin margins were undermined to an appropriate distance in all directions utilizing iris scissors.
Complex Repair And Burow's Graft Text: The defect edges were debeveled with a #15 scalpel blade.  The primary defect was closed partially with a complex linear closure.  Given the location of the defect, shape of the defect, the proximity to free margins and the presence of a standing cone deformity a Burow's graft was deemed most appropriate to repair the remaining defect.  The graft was trimmed to fit the size of the remaining defect.  The graft was then placed in the primary defect, oriented appropriately, and sutured into place.
Rhombic Flap Text: The defect edges were debeveled with a #15 scalpel blade.  Given the location of the defect and the proximity to free margins a rhombic flap was deemed most appropriate.  Using a sterile surgical marker, an appropriate rhombic flap was drawn incorporating the defect.    The area thus outlined was incised deep to adipose tissue with a #15 scalpel blade.  The skin margins were undermined to an appropriate distance in all directions utilizing iris scissors.
Paramedian Forehead Flap Text: A decision was made to reconstruct the defect utilizing an interpolation axial flap and a staged reconstruction.  A telfa template was made of the defect.  This telfa template was then used to outline the paramedian forehead pedicle flap.  The donor area for the pedicle flap was then injected with anesthesia.  The flap was excised through the skin and subcutaneous tissue down to the layer of the underlying musculature.  The pedicle flap was carefully excised within this deep plane to maintain its blood supply.  The edges of the donor site were undermined.   The donor site was closed in a primary fashion.  The pedicle was then rotated into position and sutured.  Once the tube was sutured into place, adequate blood supply was confirmed with blanching and refill.  The pedicle was then wrapped with xeroform gauze and dressed appropriately with a telfa and gauze bandage to ensure continued blood supply and protect the attached pedicle.
Anesthesia Volume In Cc: 12
Complex Repair And V-Y Plasty Text: The defect edges were debeveled with a #15 scalpel blade.  The primary defect was closed partially with a complex linear closure.  Given the location of the remaining defect, shape of the defect and the proximity to free margins a V-Y plasty was deemed most appropriate for complete closure of the defect.  Using a sterile surgical marker, an appropriate advancement flap was drawn incorporating the defect and placing the expected incisions within the relaxed skin tension lines where possible.    The area thus outlined was incised deep to adipose tissue with a #15 scalpel blade.  The skin margins were undermined to an appropriate distance in all directions utilizing iris scissors.
O-Z Flap Text: The defect edges were debeveled with a #15 scalpel blade.  Given the location of the defect, shape of the defect and the proximity to free margins an O-Z flap was deemed most appropriate.  Using a sterile surgical marker, an appropriate transposition flap was drawn incorporating the defect and placing the expected incisions within the relaxed skin tension lines where possible. The area thus outlined was incised deep to adipose tissue with a #15 scalpel blade.  The skin margins were undermined to an appropriate distance in all directions utilizing iris scissors.
Zygomaticofacial Flap Text: Given the location of the defect, shape of the defect and the proximity to free margins a zygomaticofacial flap was deemed most appropriate for repair.  Using a sterile surgical marker, the appropriate flap was drawn incorporating the defect and placing the expected incisions within the relaxed skin tension lines where possible. The area thus outlined was incised deep to adipose tissue with a #15 scalpel blade with preservation of a vascular pedicle.  The skin margins were undermined to an appropriate distance in all directions utilizing iris scissors.  The flap was then placed into the defect and anchored with interrupted buried subcutaneous sutures.
Home Suture Removal Text: Patient was provided a home suture removal kit and will remove their sutures at home.  If they have any questions or difficulties they will call the office.
Crescentic Advancement Flap Text: The defect edges were debeveled with a #15 scalpel blade.  Given the location of the defect and the proximity to free margins a crescentic advancement flap was deemed most appropriate.  Using a sterile surgical marker, the appropriate advancement flap was drawn incorporating the defect and placing the expected incisions within the relaxed skin tension lines where possible.    The area thus outlined was incised deep to adipose tissue with a #15 scalpel blade.  The skin margins were undermined to an appropriate distance in all directions utilizing iris scissors.
Complex Repair And Melolabial Flap Text: The defect edges were debeveled with a #15 scalpel blade.  The primary defect was closed partially with a complex linear closure.  Given the location of the remaining defect, shape of the defect and the proximity to free margins a melolabial flap was deemed most appropriate for complete closure of the defect.  Using a sterile surgical marker, an appropriate advancement flap was drawn incorporating the defect and placing the expected incisions within the relaxed skin tension lines where possible.    The area thus outlined was incised deep to adipose tissue with a #15 scalpel blade.  The skin margins were undermined to an appropriate distance in all directions utilizing iris scissors.
Epidermal Closure Graft Donor Site (Optional): simple interrupted
Epidermal Closure: running
V-Y Plasty Text: The defect edges were debeveled with a #15 scalpel blade.  Given the location of the defect, shape of the defect and the proximity to free margins an V-Y advancement flap was deemed most appropriate.  Using a sterile surgical marker, an appropriate advancement flap was drawn incorporating the defect and placing the expected incisions within the relaxed skin tension lines where possible.    The area thus outlined was incised deep to adipose tissue with a #15 scalpel blade.  The skin margins were undermined to an appropriate distance in all directions utilizing iris scissors.
Debridement Text: The wound edges were debrided prior to proceeding with the closure to facilitate wound healing.
Epidermal Sutures: 5-0 Ethibond
Repair Performed By Another Provider Text (Leave Blank If You Do Not Want): After the tissue was excised the defect was repaired by another provider.
Alar Island Pedicle Flap Text: The defect edges were debeveled with a #15 scalpel blade.  Given the location of the defect, shape of the defect and the proximity to the alar rim an island pedicle advancement flap was deemed most appropriate.  Using a sterile surgical marker, an appropriate advancement flap was drawn incorporating the defect, outlining the appropriate donor tissue and placing the expected incisions within the nasal ala running parallel to the alar rim. The area thus outlined was incised with a #15 scalpel blade.  The skin margins were undermined minimally to an appropriate distance in all directions around the primary defect and laterally outward around the island pedicle utilizing iris scissors.  There was minimal undermining beneath the pedicle flap.
Undermining Type: Entire Wound
Complex Repair And Dermal Autograft Text: The defect edges were debeveled with a #15 scalpel blade.  The primary defect was closed partially with a complex linear closure.  Given the location of the defect, shape of the defect and the proximity to free margins an dermal autograft was deemed most appropriate to repair the remaining defect.  The graft was trimmed to fit the size of the remaining defect.  The graft was then placed in the primary defect, oriented appropriately, and sutured into place.
Billing Type: Third-Party Bill
Helical Rim Advancement Flap Text: The defect edges were debeveled with a #15 blade scalpel.  Given the location of the defect and the proximity to free margins (helical rim) a double helical rim advancement flap was deemed most appropriate.  Using a sterile surgical marker, the appropriate advancement flaps were drawn incorporating the defect and placing the expected incisions between the helical rim and antihelix where possible.  The area thus outlined was incised through and through with a #15 scalpel blade.  With a skin hook and iris scissors, the flaps were gently and sharply undermined and freed up.
Star Wedge Flap Text: The defect edges were debeveled with a #15 scalpel blade.  Given the location of the defect, shape of the defect and the proximity to free margins a star wedge flap was deemed most appropriate.  Using a sterile surgical marker, an appropriate rotation flap was drawn incorporating the defect and placing the expected incisions within the relaxed skin tension lines where possible. The area thus outlined was incised deep to adipose tissue with a #15 scalpel blade.  The skin margins were undermined to an appropriate distance in all directions utilizing iris scissors.
Split-Thickness Skin Graft Text: The defect edges were debeveled with a #15 scalpel blade.  Given the location of the defect, shape of the defect and the proximity to free margins a split thickness skin graft was deemed most appropriate.  Using a sterile surgical marker, the primary defect shape was transferred to the donor site. The split thickness graft was then harvested.  The skin graft was then placed in the primary defect and oriented appropriately.
Complex Repair And W Plasty Text: The defect edges were debeveled with a #15 scalpel blade.  The primary defect was closed partially with a complex linear closure.  Given the location of the remaining defect, shape of the defect and the proximity to free margins a W plasty was deemed most appropriate for complete closure of the defect.  Using a sterile surgical marker, an appropriate advancement flap was drawn incorporating the defect and placing the expected incisions within the relaxed skin tension lines where possible.    The area thus outlined was incised deep to adipose tissue with a #15 scalpel blade.  The skin margins were undermined to an appropriate distance in all directions utilizing iris scissors.
Mercedes Flap Text: The defect edges were debeveled with a #15 scalpel blade.  Given the location of the defect, shape of the defect and the proximity to free margins a Mercedes flap was deemed most appropriate.  Using a sterile surgical marker, an appropriate advancement flap was drawn incorporating the defect and placing the expected incisions within the relaxed skin tension lines where possible. The area thus outlined was incised deep to adipose tissue with a #15 scalpel blade.  The skin margins were undermined to an appropriate distance in all directions utilizing iris scissors.
Dressing: pressure dressing with telfa
Interpolation Flap Text: A decision was made to reconstruct the defect utilizing an interpolation axial flap and a staged reconstruction.  A telfa template was made of the defect.  This telfa template was then used to outline the interpolation flap.  The donor area for the pedicle flap was then injected with anesthesia.  The flap was excised through the skin and subcutaneous tissue down to the layer of the underlying musculature.  The interpolation flap was carefully excised within this deep plane to maintain its blood supply.  The edges of the donor site were undermined.   The donor site was closed in a primary fashion.  The pedicle was then rotated into position and sutured.  Once the tube was sutured into place, adequate blood supply was confirmed with blanching and refill.  The pedicle was then wrapped with xeroform gauze and dressed appropriately with a telfa and gauze bandage to ensure continued blood supply and protect the attached pedicle.
Retention Suture Text: Retention sutures were placed to support the closure and prevent dehiscence.
Hemostasis: Electrocautery
O-L Flap Text: The defect edges were debeveled with a #15 scalpel blade.  Given the location of the defect, shape of the defect and the proximity to free margins an O-L flap was deemed most appropriate.  Using a sterile surgical marker, an appropriate advancement flap was drawn incorporating the defect and placing the expected incisions within the relaxed skin tension lines where possible.    The area thus outlined was incised deep to adipose tissue with a #15 scalpel blade.  The skin margins were undermined to an appropriate distance in all directions utilizing iris scissors.
Z Plasty Text: The lesion was extirpated to the level of the fat with a #15 scalpel blade.  Given the location of the defect, shape of the defect and the proximity to free margins a Z-plasty was deemed most appropriate for repair.  Using a sterile surgical marker, the appropriate transposition arms of the Z-plasty were drawn incorporating the defect and placing the expected incisions within the relaxed skin tension lines where possible.    The area thus outlined was incised deep to adipose tissue with a #15 scalpel blade.  The skin margins were undermined to an appropriate distance in all directions utilizing iris scissors.  The opposing transposition arms were then transposed into place in opposite direction and anchored with interrupted buried subcutaneous sutures.
Post-Care Instructions: I reviewed with the patient in detail post-care instructions. Patient is not to engage in any heavy lifting, exercise, or swimming for the next 14 days. Should the patient develop any fevers, chills, bleeding, severe pain patient will contact the office immediately.
Hemigard Intro: Due to skin fragility and wound tension, it was decided to use HEMIGARD adhesive retention suture devices to permit a linear closure. The skin was cleaned and dried for a 6cm distance away from the wound. Excessive hair, if present, was removed to allow for adhesion.
Keystone Flap Text: The defect edges were debeveled with a #15 scalpel blade.  Given the location of the defect, shape of the defect a keystone flap was deemed most appropriate.  Using a sterile surgical marker, an appropriate keystone flap was drawn incorporating the defect, outlining the appropriate donor tissue and placing the expected incisions within the relaxed skin tension lines where possible. The area thus outlined was incised deep to adipose tissue with a #15 scalpel blade.  The skin margins were undermined to an appropriate distance in all directions around the primary defect and laterally outward around the flap utilizing iris scissors.
Size Of Margin In Cm: 0.1
Complex Repair And Transposition Flap Text: The defect edges were debeveled with a #15 scalpel blade.  The primary defect was closed partially with a complex linear closure.  Given the location of the remaining defect, shape of the defect and the proximity to free margins a transposition flap was deemed most appropriate for complete closure of the defect.  Using a sterile surgical marker, an appropriate advancement flap was drawn incorporating the defect and placing the expected incisions within the relaxed skin tension lines where possible.    The area thus outlined was incised deep to adipose tissue with a #15 scalpel blade.  The skin margins were undermined to an appropriate distance in all directions utilizing iris scissors.
Complex Repair And Skin Substitute Graft Text: The defect edges were debeveled with a #15 scalpel blade.  The primary defect was closed partially with a complex linear closure.  Given the location of the remaining defect, shape of the defect and the proximity to free margins a skin substitute graft was deemed most appropriate to repair the remaining defect.  The graft was trimmed to fit the size of the remaining defect.  The graft was then placed in the primary defect, oriented appropriately, and sutured into place.
Mustarde Flap Text: The defect edges were debeveled with a #15 scalpel blade.  Given the size, depth and location of the defect and the proximity to free margins a Mustarde flap was deemed most appropriate.  Using a sterile surgical marker, an appropriate flap was drawn incorporating the defect. The area thus outlined was incised with a #15 scalpel blade.  The skin margins were undermined to an appropriate distance in all directions utilizing iris scissors.
Detail Level: Detailed
Intermediate / Complex Repair - Final Wound Length In Cm: 3
Banner Transposition Flap Text: The defect edges were debeveled with a #15 scalpel blade.  Given the location of the defect and the proximity to free margins a Banner transposition flap was deemed most appropriate.  Using a sterile surgical marker, an appropriate flap drawn around the defect. The area thus outlined was incised deep to adipose tissue with a #15 scalpel blade.  The skin margins were undermined to an appropriate distance in all directions utilizing iris scissors.
Composite Graft Text: The defect edges were debeveled with a #15 scalpel blade.  Given the location of the defect, shape of the defect, the proximity to free margins and the fact the defect was full thickness a composite graft was deemed most appropriate.  The defect was outline and then transferred to the donor site.  A full thickness graft was then excised from the donor site. The graft was then placed in the primary defect, oriented appropriately and then sutured into place.  The secondary defect was then repaired using a primary closure.
Helical Rim Text: The closure involved the helical rim.
Complex Repair And Single Advancement Flap Text: The defect edges were debeveled with a #15 scalpel blade.  The primary defect was closed partially with a complex linear closure.  Given the location of the remaining defect, shape of the defect and the proximity to free margins a single advancement flap was deemed most appropriate for complete closure of the defect.  Using a sterile surgical marker, an appropriate advancement flap was drawn incorporating the defect and placing the expected incisions within the relaxed skin tension lines where possible.    The area thus outlined was incised deep to adipose tissue with a #15 scalpel blade.  The skin margins were undermined to an appropriate distance in all directions utilizing iris scissors.
Hatchet Flap Text: The defect edges were debeveled with a #15 scalpel blade.  Given the location of the defect, shape of the defect and the proximity to free margins a hatchet flap was deemed most appropriate.  Using a sterile surgical marker, an appropriate hatchet flap was drawn incorporating the defect and placing the expected incisions within the relaxed skin tension lines where possible.    The area thus outlined was incised deep to adipose tissue with a #15 scalpel blade.  The skin margins were undermined to an appropriate distance in all directions utilizing iris scissors.
Bilateral Rotation Flap Text: The defect edges were debeveled with a #15 scalpel blade. Given the location of the defect, shape of the defect and the proximity to free margins a bilateral rotation flap was deemed most appropriate. Using a sterile surgical marker, an appropriate rotation flap was drawn incorporating the defect and placing the expected incisions within the relaxed skin tension lines where possible. The area thus outlined was incised deep to adipose tissue with a #15 scalpel blade. The skin margins were undermined to an appropriate distance in all directions utilizing iris scissors. Following this, the designed flap was carried over into the primary defect and sutured into place.
Complex Repair And Ftsg Text: The defect edges were debeveled with a #15 scalpel blade.  The primary defect was closed partially with a complex linear closure.  Given the location of the defect, shape of the defect and the proximity to free margins a full thickness skin graft was deemed most appropriate to repair the remaining defect.  The graft was trimmed to fit the size of the remaining defect.  The graft was then placed in the primary defect, oriented appropriately, and sutured into place.
Melolabial Transposition Flap Text: The defect edges were debeveled with a #15 scalpel blade.  Given the location of the defect and the proximity to free margins a melolabial flap was deemed most appropriate.  Using a sterile surgical marker, an appropriate melolabial transposition flap was drawn incorporating the defect.    The area thus outlined was incised deep to adipose tissue with a #15 scalpel blade.  The skin margins were undermined to an appropriate distance in all directions utilizing iris scissors.
Posterior Auricular Interpolation Flap Text: A decision was made to reconstruct the defect utilizing an interpolation axial flap and a staged reconstruction.  A telfa template was made of the defect.  This telfa template was then used to outline the posterior auricular interpolation flap.  The donor area for the pedicle flap was then injected with anesthesia.  The flap was excised through the skin and subcutaneous tissue down to the layer of the underlying musculature.  The pedicle flap was carefully excised within this deep plane to maintain its blood supply.  The edges of the donor site were undermined.   The donor site was closed in a primary fashion.  The pedicle was then rotated into position and sutured.  Once the tube was sutured into place, adequate blood supply was confirmed with blanching and refill.  The pedicle was then wrapped with xeroform gauze and dressed appropriately with a telfa and gauze bandage to ensure continued blood supply and protect the attached pedicle.
Fusiform Excision Additional Text (Leave Blank If You Do Not Want): The margin was drawn around the clinically apparent lesion.  A fusiform shape was then drawn on the skin incorporating the lesion and margins.  Incisions were then made along these lines to the appropriate tissue plane and the lesion was extirpated.
Medical Necessity Clause: This procedure was medically necessary because the lesion that was treated was:
Cheek-To-Nose Interpolation Flap Text: A decision was made to reconstruct the defect utilizing an interpolation axial flap and a staged reconstruction.  A telfa template was made of the defect.  This telfa template was then used to outline the Cheek-To-Nose Interpolation flap.  The donor area for the pedicle flap was then injected with anesthesia.  The flap was excised through the skin and subcutaneous tissue down to the layer of the underlying musculature.  The interpolation flap was carefully excised within this deep plane to maintain its blood supply.  The edges of the donor site were undermined.   The donor site was closed in a primary fashion.  The pedicle was then rotated into position and sutured.  Once the tube was sutured into place, adequate blood supply was confirmed with blanching and refill.  The pedicle was then wrapped with xeroform gauze and dressed appropriately with a telfa and gauze bandage to ensure continued blood supply and protect the attached pedicle.
Repair Type: Complex
Complex Repair And Epidermal Autograft Text: The defect edges were debeveled with a #15 scalpel blade.  The primary defect was closed partially with a complex linear closure.  Given the location of the defect, shape of the defect and the proximity to free margins an epidermal autograft was deemed most appropriate to repair the remaining defect.  The graft was trimmed to fit the size of the remaining defect.  The graft was then placed in the primary defect, oriented appropriately, and sutured into place.
Anesthesia Type: 1% lidocaine with epinephrine and a 1:10 solution of 8.4% sodium bicarbonate
Suturegard Intro: Intraoperative tissue expansion was performed, utilizing the SUTUREGARD device, in order to reduce wound tension.
Double O-Z Plasty Text: The defect edges were debeveled with a #15 scalpel blade.  Given the location of the defect, shape of the defect and the proximity to free margins a Double O-Z plasty (double transposition flap) was deemed most appropriate.  Using a sterile surgical marker, the appropriate transposition flaps were drawn incorporating the defect and placing the expected incisions within the relaxed skin tension lines where possible. The area thus outlined was incised deep to adipose tissue with a #15 scalpel blade.  The skin margins were undermined to an appropriate distance in all directions utilizing iris scissors.  Hemostasis was achieved with electrocautery.  The flaps were then transposed into place, one clockwise and the other counterclockwise, and anchored with interrupted buried subcutaneous sutures.
Complex Repair And Double M Plasty Text: The defect edges were debeveled with a #15 scalpel blade.  The primary defect was closed partially with a complex linear closure.  Given the location of the remaining defect, shape of the defect and the proximity to free margins a double M plasty was deemed most appropriate for complete closure of the defect.  Using a sterile surgical marker, an appropriate advancement flap was drawn incorporating the defect and placing the expected incisions within the relaxed skin tension lines where possible.    The area thus outlined was incised deep to adipose tissue with a #15 scalpel blade.  The skin margins were undermined to an appropriate distance in all directions utilizing iris scissors.
V-Y Flap Text: The defect edges were debeveled with a #15 scalpel blade.  Given the location of the defect, shape of the defect and the proximity to free margins a V-Y flap was deemed most appropriate.  Using a sterile surgical marker, an appropriate advancement flap was drawn incorporating the defect and placing the expected incisions within the relaxed skin tension lines where possible.    The area thus outlined was incised deep to adipose tissue with a #15 scalpel blade.  The skin margins were undermined to an appropriate distance in all directions utilizing iris scissors.
Trilobed Flap Text: The defect edges were debeveled with a #15 scalpel blade.  Given the location of the defect and the proximity to free margins a trilobed flap was deemed most appropriate.  Using a sterile surgical marker, an appropriate trilobed flap drawn around the defect.    The area thus outlined was incised deep to adipose tissue with a #15 scalpel blade.  The skin margins were undermined to an appropriate distance in all directions utilizing iris scissors.
Skin Substitute Text: The defect edges were debeveled with a #15 scalpel blade.  Given the location of the defect, shape of the defect and the proximity to free margins a skin substitute graft was deemed most appropriate.  The graft material was trimmed to fit the size of the defect. The graft was then placed in the primary defect and oriented appropriately.
Complex Repair And A-T Advancement Flap Text: The defect edges were debeveled with a #15 scalpel blade.  The primary defect was closed partially with a complex linear closure.  Given the location of the remaining defect, shape of the defect and the proximity to free margins an A-T advancement flap was deemed most appropriate for complete closure of the defect.  Using a sterile surgical marker, an appropriate advancement flap was drawn incorporating the defect and placing the expected incisions within the relaxed skin tension lines where possible.    The area thus outlined was incised deep to adipose tissue with a #15 scalpel blade.  The skin margins were undermined to an appropriate distance in all directions utilizing iris scissors.
Advancement Flap (Single) Text: The defect edges were debeveled with a #15 scalpel blade.  Given the location of the defect and the proximity to free margins a single advancement flap was deemed most appropriate.  Using a sterile surgical marker, an appropriate advancement flap was drawn incorporating the defect and placing the expected incisions within the relaxed skin tension lines where possible.    The area thus outlined was incised deep to adipose tissue with a #15 scalpel blade.  The skin margins were undermined to an appropriate distance in all directions utilizing iris scissors.
Bi-Rhombic Flap Text: The defect edges were debeveled with a #15 scalpel blade.  Given the location of the defect and the proximity to free margins a bi-rhombic flap was deemed most appropriate.  Using a sterile surgical marker, an appropriate rhombic flap was drawn incorporating the defect. The area thus outlined was incised deep to adipose tissue with a #15 scalpel blade.  The skin margins were undermined to an appropriate distance in all directions utilizing iris scissors.
Staged Advancement Flap Text: The defect edges were debeveled with a #15 scalpel blade.  Given the location of the defect, shape of the defect and the proximity to free margins a staged advancement flap was deemed most appropriate.  Using a sterile surgical marker, an appropriate advancement flap was drawn incorporating the defect and placing the expected incisions within the relaxed skin tension lines where possible. The area thus outlined was incised deep to adipose tissue with a #15 scalpel blade.  The skin margins were undermined to an appropriate distance in all directions utilizing iris scissors.
Ftsg Text: The defect edges were debeveled with a #15 scalpel blade.  Given the location of the defect, shape of the defect and the proximity to free margins a full thickness skin graft was deemed most appropriate.  Using a sterile surgical marker, the primary defect shape was transferred to the donor site. The area thus outlined was incised deep to adipose tissue with a #15 scalpel blade.  The harvested graft was then trimmed of adipose tissue until only dermis and epidermis was left.  The skin margins of the secondary defect were undermined to an appropriate distance in all directions utilizing iris scissors.  The secondary defect was closed with interrupted buried subcutaneous sutures.  The skin edges were then re-apposed with running  sutures.  The skin graft was then placed in the primary defect and oriented appropriately.
Saucerization Depth: dermis and superficial adipose tissue
Tissue Cultured Epidermal Autograft Text: The defect edges were debeveled with a #15 scalpel blade.  Given the location of the defect, shape of the defect and the proximity to free margins a tissue cultured epidermal autograft was deemed most appropriate.  The graft was then trimmed to fit the size of the defect.  The graft was then placed in the primary defect and oriented appropriately.
Orbicularis Oris Muscle Flap Text: The defect edges were debeveled with a #15 scalpel blade.  Given that the defect affected the competency of the oral sphincter an orbicularis oris muscle flap was deemed most appropriate to restore this competency and normal muscle function.  Using a sterile surgical marker, an appropriate flap was drawn incorporating the defect. The area thus outlined was incised with a #15 scalpel blade.
Slit Excision Additional Text (Leave Blank If You Do Not Want): A linear line was drawn on the skin overlying the lesion. An incision was made slowly until the lesion was visualized.  Once visualized, the lesion was removed with blunt dissection.
Scalpel Size: 15 blade
Mastoid Interpolation Flap Text: A decision was made to reconstruct the defect utilizing an interpolation axial flap and a staged reconstruction.  A telfa template was made of the defect.  This telfa template was then used to outline the mastoid interpolation flap.  The donor area for the pedicle flap was then injected with anesthesia.  The flap was excised through the skin and subcutaneous tissue down to the layer of the underlying musculature.  The pedicle flap was carefully excised within this deep plane to maintain its blood supply.  The edges of the donor site were undermined.   The donor site was closed in a primary fashion.  The pedicle was then rotated into position and sutured.  Once the tube was sutured into place, adequate blood supply was confirmed with blanching and refill.  The pedicle was then wrapped with xeroform gauze and dressed appropriately with a telfa and gauze bandage to ensure continued blood supply and protect the attached pedicle.
Complex Repair And Xenograft Text: The defect edges were debeveled with a #15 scalpel blade.  The primary defect was closed partially with a complex linear closure.  Given the location of the defect, shape of the defect and the proximity to free margins a xenograft was deemed most appropriate to repair the remaining defect.  The graft was trimmed to fit the size of the remaining defect.  The graft was then placed in the primary defect, oriented appropriately, and sutured into place.
Graft Donor Site Bandage (Optional-Leave Blank If You Don't Want In Note): Steri-strips and a pressure bandage were applied to the donor site.
Muscle Hinge Flap Text: The defect edges were debeveled with a #15 scalpel blade.  Given the size, depth and location of the defect and the proximity to free margins a muscle hinge flap was deemed most appropriate.  Using a sterile surgical marker, an appropriate hinge flap was drawn incorporating the defect. The area thus outlined was incised with a #15 scalpel blade.  The skin margins were undermined to an appropriate distance in all directions utilizing iris scissors.
W Plasty Text: The lesion was extirpated to the level of the fat with a #15 scalpel blade.  Given the location of the defect, shape of the defect and the proximity to free margins a W-plasty was deemed most appropriate for repair.  Using a sterile surgical marker, the appropriate transposition arms of the W-plasty were drawn incorporating the defect and placing the expected incisions within the relaxed skin tension lines where possible.    The area thus outlined was incised deep to adipose tissue with a #15 scalpel blade.  The skin margins were undermined to an appropriate distance in all directions utilizing iris scissors.  The opposing transposition arms were then transposed into place in opposite direction and anchored with interrupted buried subcutaneous sutures.
Mucosal Advancement Flap Text: Given the location of the defect, shape of the defect and the proximity to free margins a mucosal advancement flap was deemed most appropriate. Incisions were made with a 15 blade scalpel in the appropriate fashion along the cutaneous vermillion border and the mucosal lip. The remaining actinically damaged mucosal tissue was excised.  The mucosal advancement flap was then elevated to the gingival sulcus with care taken to preserve the neurovascular structures and advanced into the primary defect. Care was taken to ensure that precise realignment of the vermilion border was achieved.
Medical Necessity Information: It is in your best interest to select a reason for this procedure from the list below. All of these items fulfill various CMS LCD requirements except lesion extends to a margin.
Double Z Plasty Text: The lesion was extirpated to the level of the fat with a #15 scalpel blade. Given the location of the defect, shape of the defect and the proximity to free margins a double Z-plasty was deemed most appropriate for repair. Using a sterile surgical marker, the appropriate transposition arms of the double Z-plasty were drawn incorporating the defect and placing the expected incisions within the relaxed skin tension lines where possible. The area thus outlined was incised deep to adipose tissue with a #15 scalpel blade. The skin margins were undermined to an appropriate distance in all directions utilizing iris scissors. The opposing transposition arms were then transposed and carried over into place in opposite direction and anchored with interrupted buried subcutaneous sutures.
Body Location Override (Optional - Billing Will Still Be Based On Selected Body Map Location If Applicable): Right Inferior Inguinal Crease
Complex Repair And Dorsal Nasal Flap Text: The defect edges were debeveled with a #15 scalpel blade.  The primary defect was closed partially with a complex linear closure.  Given the location of the remaining defect, shape of the defect and the proximity to free margins a dorsal nasal flap was deemed most appropriate for complete closure of the defect.  Using a sterile surgical marker, an appropriate flap was drawn incorporating the defect and placing the expected incisions within the relaxed skin tension lines where possible.    The area thus outlined was incised deep to adipose tissue with a #15 scalpel blade.  The skin margins were undermined to an appropriate distance in all directions utilizing iris scissors.
Island Pedicle Flap With Canthal Suspension Text: The defect edges were debeveled with a #15 scalpel blade.  Given the location of the defect, shape of the defect and the proximity to free margins an island pedicle advancement flap was deemed most appropriate.  Using a sterile surgical marker, an appropriate advancement flap was drawn incorporating the defect, outlining the appropriate donor tissue and placing the expected incisions within the relaxed skin tension lines where possible. The area thus outlined was incised deep to adipose tissue with a #15 scalpel blade.  The skin margins were undermined to an appropriate distance in all directions around the primary defect and laterally outward around the island pedicle utilizing iris scissors.  There was minimal undermining beneath the pedicle flap. A suspension suture was placed in the canthal tendon to prevent tension and prevent ectropion.
Wound Care: Petrolatum
Distance Of Undermining In Cm (Required): 1.5
Complex Repair And Bilobe Flap Text: The defect edges were debeveled with a #15 scalpel blade.  The primary defect was closed partially with a complex linear closure.  Given the location of the remaining defect, shape of the defect and the proximity to free margins a bilobe flap was deemed most appropriate for complete closure of the defect.  Using a sterile surgical marker, an appropriate advancement flap was drawn incorporating the defect and placing the expected incisions within the relaxed skin tension lines where possible.    The area thus outlined was incised deep to adipose tissue with a #15 scalpel blade.  The skin margins were undermined to an appropriate distance in all directions utilizing iris scissors.
Chonodrocutaneous Helical Advancement Flap Text: The defect edges were debeveled with a #15 scalpel blade.  Given the location of the defect and the proximity to free margins a chondrocutaneous helical advancement flap was deemed most appropriate.  Using a sterile surgical marker, the appropriate advancement flap was drawn incorporating the defect and placing the expected incisions within the relaxed skin tension lines where possible.    The area thus outlined was incised deep to adipose tissue with a #15 scalpel blade.  The skin margins were undermined to an appropriate distance in all directions utilizing iris scissors.
Cartilage Graft Text: The defect edges were debeveled with a #15 scalpel blade.  Given the location of the defect, shape of the defect, the fact the defect involved a full thickness cartilage defect a cartilage graft was deemed most appropriate.  An appropriate donor site was identified, cleansed, and anesthetized. The cartilage graft was then harvested and transferred to the recipient site, oriented appropriately and then sutured into place.  The secondary defect was then repaired using a primary closure.
Ear Star Wedge Flap Text: The defect edges were debeveled with a #15 blade scalpel.  Given the location of the defect and the proximity to free margins (helical rim) an ear star wedge flap was deemed most appropriate.  Using a sterile surgical marker, the appropriate flap was drawn incorporating the defect and placing the expected incisions between the helical rim and antihelix where possible.  The area thus outlined was incised through and through with a #15 scalpel blade.
Purse String (Simple) Text: Given the location of the defect and the characteristics of the surrounding skin a purse string simple closure was deemed most appropriate.  Undermining was performed circumferentially around the surgical defect.  A purse string suture was then placed and tightened.

## 2023-04-18 ENCOUNTER — APPOINTMENT (OUTPATIENT)
Dept: URBAN - METROPOLITAN AREA CLINIC 255 | Age: 43
Setting detail: DERMATOLOGY
End: 2023-04-30

## 2023-04-18 DIAGNOSIS — Z48.02 ENCOUNTER FOR REMOVAL OF SUTURES: ICD-10-CM

## 2023-04-18 PROCEDURE — OTHER DIAGNOSIS COMMENT: OTHER

## 2023-04-18 PROCEDURE — OTHER COUNSELING: OTHER

## 2023-04-18 PROCEDURE — OTHER SUTURE REMOVAL (GLOBAL PERIOD): OTHER

## 2023-04-18 PROCEDURE — OTHER MIPS QUALITY: OTHER

## 2023-04-18 PROCEDURE — 99024 POSTOP FOLLOW-UP VISIT: CPT

## 2023-04-18 ASSESSMENT — LOCATION ZONE DERM: LOCATION ZONE: LEG

## 2023-04-18 ASSESSMENT — LOCATION SIMPLE DESCRIPTION DERM: LOCATION SIMPLE: RIGHT THIGH

## 2023-04-18 ASSESSMENT — LOCATION DETAILED DESCRIPTION DERM: LOCATION DETAILED: RIGHT ANTERIOR MEDIAL PROXIMAL THIGH

## 2023-04-18 NOTE — PROCEDURE: SUTURE REMOVAL (GLOBAL PERIOD)
Body Location Override (Optional - Billing Will Still Be Based On Selected Body Map Location If Applicable): Right Inguinal Crease
Add 72026 Cpt? (Important Note: In 2017 The Use Of 48261 Is Being Tracked By Cms To Determine Future Global Period Reimbursement For Global Periods): no
Detail Level: Detailed

## 2023-04-18 NOTE — PROCEDURE: DIAGNOSIS COMMENT
Detail Level: Simple
Render Risk Assessment In Note?: yes
Comment: S/P Excision for a Lipoma on (04/12/23 )

## 2023-04-26 ENCOUNTER — APPOINTMENT (OUTPATIENT)
Dept: URBAN - METROPOLITAN AREA CLINIC 259 | Age: 43
Setting detail: DERMATOLOGY
End: 2023-04-30

## 2023-04-26 DIAGNOSIS — Z48.02 ENCOUNTER FOR REMOVAL OF SUTURES: ICD-10-CM

## 2023-04-26 PROCEDURE — OTHER SUTURE REMOVAL (GLOBAL PERIOD): OTHER

## 2023-04-26 PROCEDURE — OTHER MIPS QUALITY: OTHER

## 2023-04-26 PROCEDURE — OTHER DIAGNOSIS COMMENT: OTHER

## 2023-04-26 PROCEDURE — 99024 POSTOP FOLLOW-UP VISIT: CPT

## 2023-04-26 PROCEDURE — OTHER COUNSELING: OTHER

## 2023-04-26 ASSESSMENT — LOCATION DETAILED DESCRIPTION DERM: LOCATION DETAILED: RIGHT ANTERIOR MEDIAL PROXIMAL THIGH

## 2023-04-26 ASSESSMENT — LOCATION ZONE DERM: LOCATION ZONE: LEG

## 2023-04-26 ASSESSMENT — LOCATION SIMPLE DESCRIPTION DERM: LOCATION SIMPLE: RIGHT THIGH

## 2023-04-26 NOTE — PROCEDURE: DIAGNOSIS COMMENT
Detail Level: Simple
Comment: S/P Excision for a Lipoma on (04/12/23 )
Render Risk Assessment In Note?: yes

## 2023-04-26 NOTE — PROCEDURE: SUTURE REMOVAL (GLOBAL PERIOD)
Add 48656 Cpt? (Important Note: In 2017 The Use Of 65905 Is Being Tracked By Cms To Determine Future Global Period Reimbursement For Global Periods): no
Detail Level: Detailed
Body Location Override (Optional - Billing Will Still Be Based On Selected Body Map Location If Applicable): Right Inguinal Crease

## 2023-05-11 ENCOUNTER — FCC EXTENDED DOCUMENTATION (OUTPATIENT)
Dept: PSYCHOLOGY | Facility: CLINIC | Age: 43
End: 2023-05-11
Payer: COMMERCIAL

## 2023-05-11 NOTE — PROGRESS NOTES
"                    Discharge Summary  Single Session    Client Name: Jessica Mello MRN#: [id number] YOB: 1980      Intake / Discharge Date: 3/0/7 23 discharge 5/11/23      DSM5 Diagnoses: (Sustained by DSM5 Criteria Listed Above)  Diagnoses: 300.4 (F34.1) Persistent Depressive Disorder, Moderate  Psychosocial & Contextual Factors:   Somatic/ medical concerns        Presenting Concern:  The reason for seeking services at this time is: \" Recommended by doctor, michaela had some issues diagnosed with narcolepsy. Different medication each month, medications having adverse affects\"  Adderal now, excessive daytime sleepiness, sleep attacks.   The problem(s) began Chronically exhausted for 8-9 years. Sought help in 2017 and again in 2021, full work up, labs, specialists with this job 2021, anti depressant, adverse affects, fluoxitine worked after that. Sleep specialist, dec 2021. Spring after, Chronic insomnia, disruptive sleep, 2 hours of sleep, for days, mood started worsening. July new specialist, trying multiple sleep medications, bad mood affects from medications., crying etc. Sleep inertia in the morning, doesn't like sleep medications. October / Nov off of fluoxitine, mertazopine? \"Sleep study wasn't done properly\", new sleep study cant be on selective serotonin reuptake inhibitor when starting a new sleep study. 4 weeks off of an selective serotonin reuptake inhibitor. Consult with Psychiatrist, not an issue but comorbidity of sleep issues she was told. Recommended therapy, difficult to get an appointment.  Patient has attempted to resolve these concerns in the past through no therapy, medical specialists for sleep issues.      Reason for Discharge:  Client did not return      Disposition at Time of Last Encounter:   Comments:   Pt indicated that she believed symptoms were more medical in nature vs psychological.     Risk Management:   Client denies a history of suicidal ideation, suicide attempts, " self-injurious behavior, homicidal ideation, homicidal behavior and and other safety concerns  Recommended that patient call 911 or go to the local ED should there be a change in any of these risk factors.      Referred To:  Continued care under PCP and sleep  specialists        TERE Bower   5/11/2023

## 2023-05-14 ENCOUNTER — MYC REFILL (OUTPATIENT)
Dept: FAMILY MEDICINE | Facility: CLINIC | Age: 43
End: 2023-05-14
Payer: COMMERCIAL

## 2023-05-14 DIAGNOSIS — G47.419 NARCOLEPSY WITHOUT CATAPLEXY(347.00): ICD-10-CM

## 2023-05-15 RX ORDER — DEXTROAMPHETAMINE SACCHARATE, AMPHETAMINE ASPARTATE MONOHYDRATE, DEXTROAMPHETAMINE SULFATE AND AMPHETAMINE SULFATE 5; 5; 5; 5 MG/1; MG/1; MG/1; MG/1
20 CAPSULE, EXTENDED RELEASE ORAL DAILY
Qty: 30 CAPSULE | Refills: 0 | Status: SHIPPED | OUTPATIENT
Start: 2023-05-15 | End: 2023-06-28

## 2023-06-18 ASSESSMENT — SLEEP AND FATIGUE QUESTIONNAIRES
HOW LIKELY ARE YOU TO NOD OFF OR FALL ASLEEP WHILE LYING DOWN TO REST IN THE AFTERNOON WHEN CIRCUMSTANCES PERMIT: HIGH CHANCE OF DOZING
HOW LIKELY ARE YOU TO NOD OFF OR FALL ASLEEP WHILE WATCHING TV: HIGH CHANCE OF DOZING
HOW LIKELY ARE YOU TO NOD OFF OR FALL ASLEEP WHILE SITTING INACTIVE IN A PUBLIC PLACE: HIGH CHANCE OF DOZING
HOW LIKELY ARE YOU TO NOD OFF OR FALL ASLEEP IN A CAR, WHILE STOPPED FOR A FEW MINUTES IN TRAFFIC: WOULD NEVER DOZE
HOW LIKELY ARE YOU TO NOD OFF OR FALL ASLEEP WHEN YOU ARE A PASSENGER IN A CAR FOR AN HOUR WITHOUT A BREAK: HIGH CHANCE OF DOZING
HOW LIKELY ARE YOU TO NOD OFF OR FALL ASLEEP WHILE SITTING AND READING: HIGH CHANCE OF DOZING
HOW LIKELY ARE YOU TO NOD OFF OR FALL ASLEEP WHILE SITTING AND TALKING TO SOMEONE: SLIGHT CHANCE OF DOZING
HOW LIKELY ARE YOU TO NOD OFF OR FALL ASLEEP WHILE SITTING QUIETLY AFTER LUNCH WITHOUT ALCOHOL: HIGH CHANCE OF DOZING

## 2023-06-21 ENCOUNTER — VIRTUAL VISIT (OUTPATIENT)
Dept: SLEEP MEDICINE | Facility: CLINIC | Age: 43
End: 2023-06-21
Payer: COMMERCIAL

## 2023-06-21 VITALS — WEIGHT: 108 LBS | BODY MASS INDEX: 19.14 KG/M2 | HEIGHT: 63 IN

## 2023-06-21 DIAGNOSIS — G47.419 NARCOLEPSY WITHOUT CATAPLEXY(347.00): Primary | ICD-10-CM

## 2023-06-21 PROCEDURE — 99205 OFFICE O/P NEW HI 60 MIN: CPT | Mod: VID | Performed by: PSYCHIATRY & NEUROLOGY

## 2023-06-21 RX ORDER — VENLAFAXINE HYDROCHLORIDE 75 MG/1
CAPSULE, EXTENDED RELEASE ORAL
COMMUNITY
Start: 2023-06-06 | End: 2023-07-10

## 2023-06-21 ASSESSMENT — PAIN SCALES - GENERAL: PAINLEVEL: MILD PAIN (2)

## 2023-06-21 ASSESSMENT — PATIENT HEALTH QUESTIONNAIRE - PHQ9: SUM OF ALL RESPONSES TO PHQ QUESTIONS 1-9: 18

## 2023-06-21 NOTE — PROGRESS NOTES
[facility]  Sleep Clinic Virtual Follow Up    Patient Name:  Jessica Mello  MRN:  [id number]    :  1980  Date of Service:  2023  Primary care provider:  Liberty Alvarez      Subjective:  Patient previously followed with Dr. Boothe and sought a second opinion with the [facility] 2023. [place] performed an evaluation and told the patient she should follow up with a local provider, thus prompting evaluation at our clinic today. After her [facility] visit, she decided she would like to pursue symptomatic treatment of her sleep symptoms as opposed to repeating sleep study to confirm her diagnosis of narcolepsy.     Patient is chronically sleepy during the day and always feels fatigued. Over the past 5-6 years, she has gone from being able to tolerate work to having a difficult time getting up for work. At one point, she had filed for disability assistance.    She has been dealing with loss of energy and somnolence since  - . Symptoms started to become seriously incapacitating in 2017 when she would doze off at work.     She has difficulty sleeping at night. Stress makes it more difficult to fall asleep. Denies strong emotions as triggers for sleep or weakness.    Patient's mood has been more stable lately but feels there's significant stress in setting of not having had an established sleep specialist recently, in addition to stress at work.    Pertinent Medications:   -Adderall XR 20 mg AM   -Effexor 75 mg qday (this was a recently increased dose a couple weeks ago in attempts to wean off lexapro, fluoxetine, and mirtazepine)   -Hydroxyzine 25 mg TID PRN for anxiety / sleep     Patient is not keen on adderall immediate release (previously on immediate release 15 mg a day, switched to XR formula due to shortage) as it caused excessive jitteriness, tenseness, and discomfort.    She has previously been on Xyrem for about 4 months and felt  "that it did improve her symptoms. Dose had been slowly increased to 9g. She was able to get 3-4 hours of sleep per dose and would wake up feeling relatively refreshed.     Per prior notes, she has   \"tried anti-histamine, melatonin, bupropion, fluoxetine, modafinil, and methylphenidate. She had serious emotional effects from bupropion and modafinil.\"     Per recent clinic note visit with Dr. Pranay Edward, \"Pertinent work up included 2-week actigraphy reported sufficient sleep with 8 hours of mean TST. PSG showed sleep latency of 2.6 minutes and REM latency of 132 minutes total sleep time of 437 minutes. AHI of 0.1/hour. MSLT with 5 nap opportunities showed 2 SOREMPs, but mean sleep latency was 12.3 minutes (20 min, 7 min, 6 min, 17.5 min, 0.5 min, and 10.5 min), though results are inconsistent when the table was compared to actual report with impression. Urine tox was not obtained. She was on fluoxetine during the testing.\"     Sleep-Wake schedule  Bedtime: 10 PM  Sleep onset: On average 15 - 30 min, a couple times or more it can be 1-2 hours. Will give up after 3 hours.   Nocturnal awakenings: Multiple times during the night every couple of hours, sometimes cannot fall back asleep. No particular reason why - tosses and turns.   Wake time: 8 - 9 AM (she used to get up at 6:30 - 7AM but cannot get up from bed due to excessive fatigue)   Prefers 6-7AM   Sleep inertia: Sets alarm 1 - 2 hours before needing to get up  Naps: Tries not to, occasionally does. (Around nap, sometimes at work, 15 min - 1 hour)  Preferred sleep position: Side and stomach  Employment: Data review, data / desk job  Shift work?: No     Sleep-related breathing  Snoring: No  Apneas: No  Waking up snoring/choking/gasping: No  Morning HAs: No  Dry mouth: No  Mouth breather?: Sometimes - previously in setting of chronic congestion  Weight change: No     Sleep-related behaviors  Restless legs: No  Active sleeper?: No  Dream enactment: No  Sleep " "walking: Once when she was 7 years old when she made it from her bedroom to the front room of the house  Nightmares/Night terrors: Endorses vivid and unusual dreams but no nightmares or night terrors  Sleep paralysis: Occassionally  Confusional arousals: No     Sleep hygiene  Sleep partners: No  Caffeine: A couple cups of coffee in the morning on a daily basis   EtOH: Occasionally, 1-2x a week with 1 beer or a glass of wine   Tobacco: No  Sleep-aids: See medication list above   Screen Use: No  Recreational drug use: No    A comprehensive ROS was performed and pertinent findings were included in HPI.     PMH  Past Medical History:   Diagnosis Date     Depressive disorder 6/2020     Past Surgical History:   Procedure Laterality Date     NO HISTORY OF SURGERY         Medications   I have personally reviewed the patient's medication list.     Allergies  I have personally reviewed the patient's allergy list.     Physical Examination   Vitals: Ht 1.6 m (5' 3\")   Wt 49 kg (108 lb)   BMI 19.13 kg/m    General: Sitting in a chair, no acute distress   Head: Normocephalic/atraumatic  Eyes: no icterus  Pulmonary: non-labored, breathing comfortably on room air   Psych: Affect congruent    Neuro: Limited in setting of video visit  Mental status: Awake, alert, attentive, converses appropriately  Cranial nerves: Conjugate gaze, EOM grossly intact, face grossly symmetric, no dysarthria.   Motor:Moves upper extremities antigravity (lower extremities not visualized)    Investigations   I have personally reviewed pertinent labs, tests, and radiological imaging. Discussion of notable findings is included under Impression.     Assessment   Jessica Mello is a 42 year old woman with history of depression and anxiety who presents for follow up regarding her history of hypersomnia, with possible diagnosis of narcolepsy without cataplexy. Discussed that we'd recommend a repeat sleep study in hopes of solidifying a diagnosis of " narcolepsy. However, as the patient was understandably not keen on repeating the sleep study in setting of multiple discussions with various sleep specialists and prior improvements in fatigue with Xyrem, will instead symptomatically treat for possible narcolepsy without cataplexy.    Considering her sole previous effective therapy appeared to be an oxybate, and since she understands the risks of these agents and is willing to go back on them, the ultimate goal will be to get the patient back on an oxybate most likely generic sodium oxybate as long as she can tolerate it. However, in order to assist with insurance coverage, we will also trial the patient on other medications (I.e. Sunosi and Wakix). Nevertheless, initiation of, particularly, Sunosi will require titration off of Effexor due to risk of serotonin syndrome. As the patient only recently started Effexor two weeks ago, discussed that she should first optimize her Effexor management with her prescribing provider for 1 month. She will then [facility] symptomatic efficacy of Effexor on her mood. If Effexor does not provide much benefit, would then recommend weaning completely off Effexor and Adderall with her prescribing provider. This would then allow for initiation of Sunosi, Wakix, and Xyrem (in that order) for symptomatic management of possible narcolepsy.    Side effects of these agents were discussed, patient voiced understanding.    Plan:  -Optimize effexor management with prescribing provider.   -Re-assess effects of Effexor on mood and send an update to Dr. Downey via [employer] in 4 weeks.  -If Effexor does not confer much benefit, recommend titrating off Effexor and Adderall completely (defer titration schedule to prescribing provider).  -Once completely off Effexor and Adderall, Shayan will be willing to go ahead and start Rx Sunosi (would not start while on effexor due to concerns for sterotonin syndrome)   -After  starting Sunosi, start Wakix (ok to take both at the same time) with the ultimate expectation that the patient may need to be Rx sodium oxybate  -Patient is aware that she will likely be following up with a different provider however, Dr. Downey will continue to follow along and happy to Rx the medications as described above in the order above as she is waiting for a 6 month, or so, follow up with a different provider.   -Never drive if tired or sleepy.    Patient was seen and discussed with Dr. Downey.    Evelyn Kerns MD  PGY-2 Neurology Resident    Outpatient General Neurology Staff  I have seen and evaluated the patient and discussed with the neurology resident on 6-.  I supervised the resident during the visit and agree with the documentation.      In addition to face to face time I have also reviewed the patients chart, coordinated care, assisted in placing orders and documentation.  Total time (Face-to-Face, care coordination, orders, documentation) spent on 6-21-23 was 70 minutes.     Sotero Downey MD

## 2023-06-21 NOTE — NURSING NOTE
Is the patient currently in the state of [place]? YES    Visit mode:TELEPHONE    If the visit is dropped, the patient can be reconnected by: VIDEO VISIT: Send to e-mail at: [email or web address]    Will anyone else be joining the visit? NO      How would you like to obtain your AVS? MyChart    Are changes needed to the allergy or medication list? NO    Reason for visit: RECHECK  Has patient had flu shot for current/most recent flu season? If so, when? No    [id number]

## 2023-06-27 DIAGNOSIS — G47.419 NARCOLEPSY WITHOUT CATAPLEXY(347.00): Primary | ICD-10-CM

## 2023-06-27 NOTE — TELEPHONE ENCOUNTER
Pending Prescriptions:                       Disp   Refills    amphetamine-dextroamphetamine (ADDERALL X*30 cap*0            Sig: Take 1 capsule (20 mg) by mouth daily    amphetamine-dextroamphetamine (ADDERALL X*30 cap*0            Sig: Take 1 capsule (20 mg) by mouth daily    amphetamine-dextroamphetamine (ADDERALL X*30 cap*0            Sig: Take 1 capsule (20 mg) by mouth daily          Last Written Prescription Date:  5/15/23  Last Fill Quantity: 30   # refills: 0  Last Office Visit: 6/21/23  Future Office visit: NA      Routing refill request to provider for review/approval because:  Drug not on the [facility], P or [facility] refill protocol or controlled substance

## 2023-06-27 NOTE — LETTER
[facility]  06/28/23  Patient: Jessica Mello  YOB: 1980  Medical Record Number: [id number]                                                                                  Non-Opioid Controlled Substance Agreement    This is an agreement between you and your provider regarding safe and appropriate use of controlled substances prescribed by your care team. Controlled substances are?medicines that can cause physical and mental dependence (abuse).     There are strict laws about having and using these medicines. We here at [facility] are  committed to working with you in your efforts to get better. To support you in this work, we'll help you schedule regular office appointments for medicine refills. If we must cancel or change your appointment for any reason, we'll make sure you have enough medicine to last until your next appointment.     As a Provider, I will:   Listen carefully to your concerns while treating you with respect.   Recommend a treatment plan that I believe is in your best interest and may involve therapies other than medicine.    Talk with you often about the possible benefits and the risk of harm of any medicine that we prescribe for you.  Assess the safety of this medicine and check how well it works.    Provide a plan on how to taper (discontinue or go off) using this medicine if the decision is made to stop its use.      ::  As a Patient, I understand controlled substances:     Are prescribed by my care provider to help me function or work and manage my condition(s).?  Are strong medicines and can cause serious side effects.     Need to be taken exactly as prescribed.?Combining controlled substances with certain medicines or chemicals (such as illegal drugs, alcohol, sedatives, sleeping pills, and benzodiazepines) can be dangerous or even fatal.? If I stop taking my medicines suddenly, I may have severe withdrawal symptoms.     The risks,  benefits, and side effects of these medicine(s) were explained to me. I agree that:    I will take part in other treatments as advised by my care team. This may be psychiatry or counseling, physical therapy, behavioral therapy, group treatment or a referral to specialist.    I will keep all my appointments and understand this is part of the monitoring of controlled substances.?My care team may require an office visit for EVERY controlled substance refill. If I miss appointments or don t follow instructions, my care team may stop my medicine    I will take my medicines as prescribed. I will not change the dose or schedule unless my care team tells me to. There will be no refills if I run out early.      I may be asked to come to the clinic and complete a urine drug test or complete a pill count. If I don t give a urine sample or participate in a pill count, the care team may stop my medicine.    I will only receive controlled substance prescriptions from this clinic. If I am treated by another provider, I will tell them that I am taking controlled substances and that I have a treatment agreement with this provider. I will inform my [facility] care team within one business day if I am given a prescription for any controlled substance by another healthcare provider. My [facility] care team can contact other providers and pharmacists about my use of any medicines.    It is up to me to make sure that I don't run out of my medicines on weekends or holidays.?If my care team is willing to refill my prescription without a visit, I must request refills only during office hours. Refills may take up to 3 business days to process. I will use one pharmacy to fill all my controlled substance prescriptions. I will notify the clinic about any changes to my insurance or medicine availability.    I am responsible for my prescriptions. If the medicine/prescription is lost, stolen or destroyed, it will not be replaced.?I  also agree not to share controlled substance medicines with anyone.     I am aware I should not use any illegal or recreational drugs. I agree not to drink alcohol unless my care team says I can.     If I enroll in the [place] Medical Cannabis program, I will tell my care team before my next refill.    I will tell my care team right away if I become pregnant, have a new medical problem treated outside of my regular clinic, or have a change in my medicines.     I understand that this medicine can affect my thinking, judgment and reaction time.? Alcohol and drugs affect the brain and body, which can affect the safety of my driving. Being under the influence of alcohol or drugs can affect my decision-making, behaviors, personal safety and the safety of others. Driving while impaired (DWI) can occur if a person is driving, operating or in physical control of a car, motorcycle, boat, snowmobile, ATV, motorbike, off-road vehicle or any other motor vehicle ([place] Statute 169[place].20). I understand the risk if I choose to drive or operate any vehicle or machinery.    I understand that if I do not follow any of the conditions above, my prescriptions or treatment may be stopped or changed.   I agree that my provider, clinic care team and pharmacy may work with any city, state or federal law enforcement agency that investigates the misuse, sale or other diversion of my controlled medicine. I will allow my provider to discuss my care with, or share a copy of, this agreement with any other treating provider, pharmacy or emergency room where I receive care.     I have read this agreement and have asked questions about anything I did not understand.    ________________________________________________________  Patient Signature - Jessica Mello     ___________________                   Date     ________________________________________________________  Provider Signature - Sotero Downey MD       ___________________                    Date     ________________________________________________________  Witness Signature (required if provider not present while patient signing)          ___________________                   Date

## 2023-06-28 RX ORDER — DEXTROAMPHETAMINE SACCHARATE, AMPHETAMINE ASPARTATE MONOHYDRATE, DEXTROAMPHETAMINE SULFATE AND AMPHETAMINE SULFATE 5; 5; 5; 5 MG/1; MG/1; MG/1; MG/1
20 CAPSULE, EXTENDED RELEASE ORAL DAILY
Qty: 30 CAPSULE | Refills: 0 | Status: SHIPPED | OUTPATIENT
Start: 2023-06-28 | End: 2023-09-26

## 2023-06-28 RX ORDER — DEXTROAMPHETAMINE SACCHARATE, AMPHETAMINE ASPARTATE MONOHYDRATE, DEXTROAMPHETAMINE SULFATE AND AMPHETAMINE SULFATE 5; 5; 5; 5 MG/1; MG/1; MG/1; MG/1
20 CAPSULE, EXTENDED RELEASE ORAL DAILY
Qty: 30 CAPSULE | Refills: 0 | Status: SHIPPED | OUTPATIENT
Start: 2023-08-26 | End: 2023-09-26

## 2023-06-28 RX ORDER — DEXTROAMPHETAMINE SACCHARATE, AMPHETAMINE ASPARTATE MONOHYDRATE, DEXTROAMPHETAMINE SULFATE AND AMPHETAMINE SULFATE 5; 5; 5; 5 MG/1; MG/1; MG/1; MG/1
20 CAPSULE, EXTENDED RELEASE ORAL DAILY
Qty: 30 CAPSULE | Refills: 0 | Status: SHIPPED | OUTPATIENT
Start: 2023-07-27 | End: 2023-09-26

## 2023-06-29 ENCOUNTER — LAB (OUTPATIENT)
Dept: LAB | Facility: CLINIC | Age: 43
End: 2023-06-29
Payer: COMMERCIAL

## 2023-06-29 DIAGNOSIS — G47.419 NARCOLEPSY WITHOUT CATAPLEXY(347.00): ICD-10-CM

## 2023-06-29 LAB
AMPHETAMINES UR QL SCN: ABNORMAL
BARBITURATES UR QL SCN: ABNORMAL
BENZODIAZ UR QL SCN: ABNORMAL
BZE UR QL SCN: ABNORMAL
CANNABINOIDS UR QL SCN: ABNORMAL
OPIATES UR QL SCN: ABNORMAL
PCP QUAL URINE (ROCHE): ABNORMAL

## 2023-06-29 PROCEDURE — 80359 METHYLENEDIOXYAMPHETAMINES: CPT | Mod: 90

## 2023-06-29 PROCEDURE — 80307 DRUG TEST PRSMV CHEM ANLYZR: CPT

## 2023-06-29 PROCEDURE — 80325 AMPHETAMINES 3OR 4: CPT | Mod: 90

## 2023-06-29 PROCEDURE — 99000 SPECIMEN HANDLING OFFICE-LAB: CPT

## 2023-07-02 LAB
AMPHET UR-MCNC: 2436 NG/ML
MDA UR-MCNC: <200 NG/ML
MDEA UR-MCNC: <200 NG/ML
MDMA UR-MCNC: <200 NG/ML
METHAMPHET UR-MCNC: <200 NG/ML
PHENTERMINE UR CFM-MCNC: <200 NG/ML

## 2023-07-10 ENCOUNTER — OFFICE VISIT (OUTPATIENT)
Dept: ALLERGY | Facility: CLINIC | Age: 43
End: 2023-07-10
Payer: COMMERCIAL

## 2023-07-10 VITALS — OXYGEN SATURATION: 100 % | HEART RATE: 120 BPM | SYSTOLIC BLOOD PRESSURE: 124 MMHG | DIASTOLIC BLOOD PRESSURE: 85 MMHG

## 2023-07-10 DIAGNOSIS — R09.81 NASAL CONGESTION: Primary | ICD-10-CM

## 2023-07-10 DIAGNOSIS — J31.0 NONALLERGIC RHINITIS: ICD-10-CM

## 2023-07-10 DIAGNOSIS — Z88.0 ALLERGY TO AMOXICILLIN: ICD-10-CM

## 2023-07-10 PROCEDURE — 99243 OFF/OP CNSLTJ NEW/EST LOW 30: CPT | Mod: 25 | Performed by: ALLERGY & IMMUNOLOGY

## 2023-07-10 PROCEDURE — 95004 PERQ TESTS W/ALRGNC XTRCS: CPT | Performed by: ALLERGY & IMMUNOLOGY

## 2023-07-10 ASSESSMENT — ENCOUNTER SYMPTOMS
EYE REDNESS: 0
FEVER: 0
COUGH: 0
CHEST TIGHTNESS: 0
SINUS PRESSURE: 0
RHINORRHEA: 0
EYE DISCHARGE: 0
WHEEZING: 0
LIGHT-HEADEDNESS: 0
ABDOMINAL PAIN: 0
ADENOPATHY: 0
CONSTIPATION: 0
SHORTNESS OF BREATH: 0
DIZZINESS: 0
HEADACHES: 1
FATIGUE: 0
NAUSEA: 0
VOMITING: 0
SORE THROAT: 0
ACTIVITY CHANGE: 0
JOINT SWELLING: 0
CHILLS: 0
DIARRHEA: 0
NERVOUS/ANXIOUS: 0
EYE ITCHING: 0

## 2023-07-10 NOTE — PROGRESS NOTES
Per provider verbal order, placed Adult Environmental Panel scratch test.  Consent was obtained prior to procedure.  Once panels were placed, patient was monitored for 15 minutes in clinic.  Provider read test after 15 minutes..  Pt tolerated procedure well.  All questions and concerns were addressed at office visit.     Liberty Hughes, CHANDLERN, RN
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
## 2023-07-18 PROBLEM — J31.0 NONALLERGIC RHINITIS: Status: ACTIVE | Noted: 2023-07-18

## 2023-07-19 ENCOUNTER — MYC MEDICAL ADVICE (OUTPATIENT)
Dept: SLEEP MEDICINE | Facility: CLINIC | Age: 43
End: 2023-07-19
Payer: COMMERCIAL

## 2023-07-19 ENCOUNTER — MYC MEDICAL ADVICE (OUTPATIENT)
Dept: ALLERGY | Facility: CLINIC | Age: 43
End: 2023-07-19
Payer: COMMERCIAL

## 2023-07-27 ENCOUNTER — TELEPHONE (OUTPATIENT)
Dept: SLEEP MEDICINE | Facility: CLINIC | Age: 43
End: 2023-07-27

## 2023-07-27 NOTE — TELEPHONE ENCOUNTER
Patient called the office today and is upset that her message from last week was not responded to. Please see kyle from 7/19/2023 and determine how we should proceed.   adhesive bandage applied to wound securely.

## 2023-07-29 RX ORDER — SOLRIAMFETOL 75 MG/1
75 TABLET, FILM COATED ORAL EVERY MORNING
Qty: 62 TABLET | Refills: 4 | Status: SHIPPED | OUTPATIENT
Start: 2023-07-29 | End: 2023-08-08 | Stop reason: DRUGHIGH

## 2023-07-31 ENCOUNTER — OFFICE VISIT (OUTPATIENT)
Dept: ALLERGY | Facility: CLINIC | Age: 43
End: 2023-07-31
Payer: COMMERCIAL

## 2023-07-31 VITALS — SYSTOLIC BLOOD PRESSURE: 122 MMHG | DIASTOLIC BLOOD PRESSURE: 83 MMHG | OXYGEN SATURATION: 98 % | HEART RATE: 115 BPM

## 2023-07-31 DIAGNOSIS — Z88.0 PENICILLIN ALLERGY: ICD-10-CM

## 2023-07-31 DIAGNOSIS — T50.905A ADVERSE EFFECT OF DRUG, INITIAL ENCOUNTER: Primary | ICD-10-CM

## 2023-07-31 PROCEDURE — 95076 INGEST CHALLENGE INI 120 MIN: CPT | Performed by: ALLERGY & IMMUNOLOGY

## 2023-07-31 PROCEDURE — 95018 ALL TSTG PERQ&IQ DRUGS/BIOL: CPT | Performed by: ALLERGY & IMMUNOLOGY

## 2023-07-31 ASSESSMENT — ENCOUNTER SYMPTOMS
CHEST TIGHTNESS: 0
EYE REDNESS: 0
ABDOMINAL PAIN: 0
CHILLS: 0
FEVER: 0
HEADACHES: 0
NERVOUS/ANXIOUS: 0
CONSTIPATION: 0
JOINT SWELLING: 0
NAUSEA: 0
SHORTNESS OF BREATH: 0
ACTIVITY CHANGE: 0
ADENOPATHY: 0
EYE ITCHING: 0
VOMITING: 0
DIZZINESS: 0
DIARRHEA: 0
SINUS PRESSURE: 0
LIGHT-HEADEDNESS: 0
SORE THROAT: 0
EYE DISCHARGE: 0
COUGH: 0
RHINORRHEA: 0
FATIGUE: 0
WHEEZING: 0

## 2023-07-31 NOTE — LETTER
2023         RE: Jessica Mello  [address]  [place] [address]        Dear Colleague,    Thank you for referring your patient, Jessica Mello, to the [facility]. Please see a copy of my visit note below.    Jessica Mello was seen in the Allergy Clinic at [facility].      Jessica Mello is a 42 year old Not  or  female who is seen today for evaluation of penicillin allergy. She has been feeling well and has not had any recent fevers or illness. She has not taken antihistamines in the past 7 days. Jessica was counseled regarding the risks and benefits of today's procedure and gave verbal and written consent to proceed.    Past Medical History:   Diagnosis Date     Depressive disorder 2020     Family History   Problem Relation Age of Onset     Hypertension Mother      Breast Cancer Mother 65     Hyperlipidemia Father      Depression Sister      Anxiety Disorder Sister      Thyroid Disease Sister      Narcolepsy Sister      Thyroid Disease Maternal Grandmother      Depression Sister      Attention Deficit Disorder Sister      Colon Cancer Paternal Uncle         dx after age 50     Social History     Tobacco Use     Smoking status: Former     Packs/day: 0.10     Years: 20.00     Pack years: 2.00     Types: Cigarettes     Start date: 1998     Quit date: 2022     Years since quittin.1     Smokeless tobacco: Never     Tobacco comments:     used to promote wakefulness   Vaping Use     Vaping Use: Never used   Substance Use Topics     Alcohol use: Yes     Comment: 3-4 drinks per week.     Drug use: Yes     Types: Marijuana     Comment: intermittent use for pain/sleep- few times a month     Social History     Social History Narrative     Not on file       Past medical, family, and social history were reviewed.    Review of Systems   Constitutional:  Negative for activity change, chills, fatigue and fever.    HENT:  Negative for congestion, nosebleeds, postnasal drip, rhinorrhea, sinus pressure, sneezing and sore throat.    Eyes:  Negative for discharge, redness and itching.   Respiratory:  Negative for cough, chest tightness, shortness of breath and wheezing.    Cardiovascular:  Negative for chest pain.   Gastrointestinal:  Negative for abdominal pain, constipation, diarrhea, nausea and vomiting.   Musculoskeletal:  Negative for joint swelling.   Skin:  Negative for rash.   Neurological:  Negative for dizziness, light-headedness and headaches.   Hematological:  Negative for adenopathy.   Psychiatric/Behavioral:  Negative for behavioral problems. The patient is not nervous/anxious.          Current Outpatient Medications:      amphetamine-dextroamphetamine (ADDERALL XR) 20 MG 24 hr capsule, Take 1 capsule (20 mg) by mouth daily, Disp: 30 capsule, Rfl: 0     amphetamine-dextroamphetamine (ADDERALL XR) 20 MG 24 hr capsule, Take 1 capsule (20 mg) by mouth daily, Disp: 30 capsule, Rfl: 0     [START ON 8/26/2023] amphetamine-dextroamphetamine (ADDERALL XR) 20 MG 24 hr capsule, Take 1 capsule (20 mg) by mouth daily, Disp: 30 capsule, Rfl: 0     cholecalciferol (VITAMIN D3) 25 mcg (1000 units) capsule, Take 2,000 Int'l Units by mouth, Disp: , Rfl:      fluticasone (FLONASE) 50 MCG/ACT nasal spray, Spray 1 spray into both nostrils daily, Disp: 16 g, Rfl: 5     hydrOXYzine (ATARAX) 25 MG tablet, Take 1-2 tablets (25-50 mg) by mouth 3 times daily as needed for other (sleep/anxiety), Disp: 60 tablet, Rfl: 1     Solriamfetol HCl (SOLRIAMFETOL) 75 MG TABS, Take 75 mg by mouth every morning After two weeks may increase to two tabs (150mg) po q am., Disp: 62 tablet, Rfl: 4  Allergies   Allergen Reactions     Bupropion Unknown     Modafinil Unknown       EXAM:   /83 (BP Location: Right arm, Patient Position: Sitting, Cuff Size: Adult Regular)   Pulse 115   SpO2 98%   Physical Exam  Vitals and nursing note reviewed.    Constitutional:       Appearance: Normal appearance.   HENT:      Head: Normocephalic and atraumatic.      Right Ear: External ear normal.      Left Ear: External ear normal.      Nose: No rhinorrhea.      Mouth/Throat:      Mouth: Mucous membranes are moist. No oral lesions.      Pharynx: Oropharynx is clear. Uvula midline. No posterior oropharyngeal erythema.   Eyes:      General: Lids are normal.      Extraocular Movements: Extraocular movements intact.      Conjunctiva/sclera: Conjunctivae normal.   Neck:      Comments: No asymmetry, masses, or scars  Cardiovascular:      Rate and Rhythm: Normal rate and regular rhythm.      Heart sounds: Normal heart sounds, S1 normal and S2 normal.   Pulmonary:      Effort: Pulmonary effort is normal. No respiratory distress.      Breath sounds: Normal breath sounds and air entry.   Musculoskeletal:      Comments: No musculoskeletal defects appreciated   Skin:     General: Skin is warm and dry.      Findings: No lesion or rash.   Neurological:      General: No focal deficit present.      Mental Status: She is alert.   Psychiatric:         Mood and Affect: Mood and affect normal.           WORKUP:  Drug Challenge    After discussing the risks and benefits of the procedure, including the risks of the oral medication challenge, written and verbal consent was obtained and the procedure was initiated.     Skin Prick/Intradermal Testing    Product Lot #/Exp. Date Time Placed Skin Prick  W (mm) F (mm) Time Placed Intradermal #1  W (mm) F (mm) Intradermal #2  W (mm) F (mm)  Results  +/-       Time Read      Pre-Pen  0.02 ml of 6X10-5 molar [id number]  12/24 13:37 0 0 14:02 4 4 4 4 -        14:17 0 0 0 0    Penicillin G  10,000 units/ ml [id number]  08/23 13:37 0 0 13:58 3 3 5 5 -        14:13 0 0 0 0    Diluent/Control  13:37 0 0 13:57 4 4 * -        14:12 0 0     Histamine  13:37 5 25  +         Oral Challenge    Amoxicillin  250mg/5mL oral suspension  or   250mg chewable tablet Lot  #/Exp. Dose Time of Ingestion Time of Vitals BP Pulse       pOx Signs/Symptoms Result  +/-     Dose (125mg)  Wait 30 minutes [id number]  12/2024 1. 2.5mL  2. 0.5 tablet   14:27   14:57 138/91   92   100%   none   -     Dose (375mg)  Wait 60 minutes  1.  7.5mL  2.  1.5 tablet   15:00 15:30 124/81 85 100% none -         16:00 127/72 85 100% none -     Start: 1427  End: 1600    ASSESSMENT/PLAN:  Jessica Mello is a 42 year old female here for evaluation of penicillin allergy.    1. Adverse effect of drug, initial encounter - Jessica tolerated today's procedure well without developing any signs or symptoms of an adverse reaction.     - no evidence of IgE mediated hypersensitivity reaction to penicillin - this and related antibiotics may be prescribed with the risk of a reaction being the same as the general population.  - negative testing does not preclude the development of side effects, including nausea, vomiting, diarrhea, or rash as well as delayed hypersensitivity reactions  - FL ALLG TEST PERQ & IC DRUG/BIOL IMMED REACT W/ I&R  - FL INGESTION CHALLENGE TEST INITIAL 120 MINUTES    2. Penicillin allergy    - FL ALLG TEST PERQ & IC DRUG/BIOL IMMED REACT W/ I&R  - FL INGESTION CHALLENGE TEST INITIAL 120 MINUTES      Follow-up in the allergy clinic as needed      Thank you for allowing me to participate in the care of Jessica Mello.      Trisha Samson MD, FAAAAI  Allergy/Immunology  [facility]      Chart documentation done in part with Dragon Voice Recognition Software. Although reviewed after completion, some word and grammatical errors may remain.    Patient evaluated by provider prior to start of penicillin challenge.  RN administered skin prick testing and intradermals per physician directed guidelines.  Patient was monitored for 15 minutes after each administered dose.  Both oral doses of Penicillin were given without  reaction. Vital signs were recorded. Patient tolerated the testing well. All questions and concerns were addressed in clinic during challenge.       Liberty Hughes RN      Again, thank you for allowing me to participate in the care of your patient.        Sincerely,        Trisha Samson MD

## 2023-07-31 NOTE — PROGRESS NOTES
Patient evaluated by provider prior to start of penicillin challenge.  RN administered skin prick testing and intradermals per physician directed guidelines.  Patient was monitored for 15 minutes after each administered dose.  Both oral doses of Penicillin were given without reaction. Vital signs were recorded. Patient tolerated the testing well. All questions and concerns were addressed in clinic during challenge.       Liberty Hughes RN

## 2023-08-07 ENCOUNTER — MYC MEDICAL ADVICE (OUTPATIENT)
Dept: SLEEP MEDICINE | Facility: CLINIC | Age: 43
End: 2023-08-07
Payer: COMMERCIAL

## 2023-08-25 ENCOUNTER — MYC MEDICAL ADVICE (OUTPATIENT)
Dept: SLEEP MEDICINE | Facility: CLINIC | Age: 43
End: 2023-08-25
Payer: COMMERCIAL

## 2023-08-29 NOTE — PATIENT INSTRUCTIONS
Jessica is scheduled for a virtual follow up with Dr. Costello on 11/15/22 at 4:00 pm.    Jessica Grullon RN on 10/18/2022 at 10:10 AM    
What Type Of Note Output Would You Prefer (Optional)?: Bullet Format
How Severe Is Your Rash?: moderate
Is This A New Presentation, Or A Follow-Up?: Rash
Additional History: Waxes and wanes

## 2023-08-30 ENCOUNTER — MEDICAL CORRESPONDENCE (OUTPATIENT)
Dept: HEALTH INFORMATION MANAGEMENT | Facility: CLINIC | Age: 43
End: 2023-08-30

## 2023-08-30 RX ORDER — PITOLISANT HYDROCHLORIDE 4.45 MG/1
TABLET, FILM COATED ORAL
Qty: 62 TABLET | Refills: 4
Start: 2023-08-30 | End: 2023-10-05

## 2023-08-30 NOTE — TELEPHONE ENCOUNTER
Called patient to discussed her current situation.      Not doing well on solriamfetol.  Has not helped significantly.  Had some headaches on the medication.     Stop Solriamfetol now. Already off of adderral and Effexor.     Will try Wakix 4.45mg take 2 tablets po q daily X 7 days.   Then 17.8mg tab take one po q am X 7 days.   Then take two tabs (35.6mg) po q am.     Patient enrollment form will be left at  for her to fill out.

## 2023-09-05 ENCOUNTER — TELEPHONE (OUTPATIENT)
Dept: SLEEP MEDICINE | Facility: CLINIC | Age: 43
End: 2023-09-05
Payer: COMMERCIAL

## 2023-09-05 NOTE — TELEPHONE ENCOUNTER
Prior Authorization Retail Medication Request    Medication/Dose: Wakix 4.45mg   ICD code (if different than what is on RX):  G47.10  Previously Tried and Failed:    Rationale:      Insurance Name:  [employer]  Insurance ID: [id number]       Pharmacy Information (if different than what is on RX)  Name: [address]    Phone:  [phone]

## 2023-09-06 NOTE — TELEPHONE ENCOUNTER
Central Prior Authorization Team  Phone: [phone]    PA Initiation    Medication: WAKIX 4.45 MG PO TABS  Insurance Company: [employer] - Phone [phone] Fax [phone]  Pharmacy Filling the Rx: [address]  Filling Pharmacy Phone: [phone]  Filling Pharmacy Fax:    Start Date: 9/6/2023

## 2023-09-11 NOTE — TELEPHONE ENCOUNTER
Prior Authorization Approval    Medication: WAKIX 4.45 MG PO TABS  Authorization Effective Date: 8/9/2023  Authorization Expiration Date: 3/6/2024  Approved Dose/Quantity:   Reference #:     Insurance Company: [employer] - Phone [phone] Fax [phone]  Expected CoPay:       CoPay Card Available:      Financial Assistance Needed:   Which Pharmacy is filling the prescription:   Pharmacy Notified: No- [facility] does not have this rx.  Patient Notified:  Yes- informed pt of pa approval.

## 2023-09-18 ENCOUNTER — MYC MEDICAL ADVICE (OUTPATIENT)
Dept: FAMILY MEDICINE | Facility: CLINIC | Age: 43
End: 2023-09-18
Payer: COMMERCIAL

## 2023-09-25 ASSESSMENT — ENCOUNTER SYMPTOMS
COUGH: 0
NAUSEA: 0
SHORTNESS OF BREATH: 0
MYALGIAS: 1
HEMATOCHEZIA: 0
EYE PAIN: 0
DIARRHEA: 0
PARESTHESIAS: 0
SORE THROAT: 0
WEAKNESS: 1
HEMATURIA: 0
ARTHRALGIAS: 1
JOINT SWELLING: 0
BREAST MASS: 0
FEVER: 0
DYSURIA: 0
CHILLS: 0
ABDOMINAL PAIN: 0
HEADACHES: 1
NERVOUS/ANXIOUS: 0
PALPITATIONS: 0
CONSTIPATION: 0
FREQUENCY: 0
DIZZINESS: 0
HEARTBURN: 0

## 2023-09-25 ASSESSMENT — PATIENT HEALTH QUESTIONNAIRE - PHQ9
SUM OF ALL RESPONSES TO PHQ QUESTIONS 1-9: 19
SUM OF ALL RESPONSES TO PHQ QUESTIONS 1-9: 19
10. IF YOU CHECKED OFF ANY PROBLEMS, HOW DIFFICULT HAVE THESE PROBLEMS MADE IT FOR YOU TO DO YOUR WORK, TAKE CARE OF THINGS AT HOME, OR GET ALONG WITH OTHER PEOPLE: EXTREMELY DIFFICULT

## 2023-09-26 ENCOUNTER — VIRTUAL VISIT (OUTPATIENT)
Dept: FAMILY MEDICINE | Facility: CLINIC | Age: 43
End: 2023-09-26
Payer: COMMERCIAL

## 2023-09-26 DIAGNOSIS — G47.419 NARCOLEPSY WITHOUT CATAPLEXY(347.00): Primary | ICD-10-CM

## 2023-09-26 DIAGNOSIS — R53.83 FATIGUE, UNSPECIFIED TYPE: ICD-10-CM

## 2023-09-26 DIAGNOSIS — F41.9 ANXIETY: ICD-10-CM

## 2023-09-26 DIAGNOSIS — F32.1 MODERATE MAJOR DEPRESSION (H): ICD-10-CM

## 2023-09-26 PROCEDURE — 99214 OFFICE O/P EST MOD 30 MIN: CPT | Mod: VID | Performed by: FAMILY MEDICINE

## 2023-09-26 NOTE — PROGRESS NOTES
Jessica is a 42 year old who is being evaluated via a billable video visit.      How would you like to obtain your AVS? [email or web address]  If the video visit is dropped, the invitation should be resent by: Send to e-mail at: [email or web address]  Will anyone else be joining your video visit? No          Assessment & Plan     Narcolepsy without cataplexy(347.00)  Fatigue, unspecified type  Extreme fatigue impacting her daily function and ability to work. Working with sleep clinic and will be trialing a new medication with uncertain start date. Med will take time to work/become effective. Agree with time off work request and FMLA leave due to her extreme sx's. Will fill out forms for no work Starting Monday 10/2/23- Som 12/10/23  Will will upload the fmla forms into [employer] for me to complete.     Moderate major depression (H)  Anxiety  Still hopelessness with above but better than it has been in the past since off stimulants/antidepressant.   Encouraged restart of therapy. She is hesitant due to cost/time/effort but will consider some out of system options.     Follow up in 9-10 weeks prior to return to work date.          I spent a total of 32 minutes on the day of the visit.   Time spent by me doing chart review, history and exam, documentation and further activities per the note      Liberty Alvarez MD  [facility]   Jessica is a 42 year old, presenting for the following health issues:  Sleep Problem        9/26/2023     7:27 AM   Additional Questions   Roomed by Sakshi       History of Present Illness       Reason for visit:  FMLA for Narcolepsy  Symptom onset:  More than a month  Symptoms include:  Overwhelming exhaustion, headaches, nausea, really bad memory, cannot function at work, cannot keep a schedule  Symptom intensity:  Severe  Symptom progression:  Worsening  Had these symptoms before:  Yes  Has tried/received treatment for these symptoms:  No  What makes it  "worse:  Activity, stress    She eats 2-3 servings of fruits and vegetables daily.She consumes 0 sweetened beverage(s) daily.She exercises with enough effort to increase her heart rate 9 or less minutes per day.  She exercises with enough effort to increase her heart rate 3 or less days per week.      Started seeing another sleep provider in June. He is leaving practice    Effexor was tried by psych and told by sleep provider to optimize prior to new meds  Stopped as it wasn't working but stopped after she found out it would cause withdrawel    Snowski, wakix and zyrem  Had to stop all her other meds.   Started snowski Aug 11. Had bad side effects: more tired, irritability, slept an entire weekend. Titrated dose and was on for few weeks. Nausea and not able to eat. Stopped taking 8/29/23  Aug 30th- talked with sleep med again. Wakix to be started. Waiting to get rx and told up to 8 weeks to get benefits.   Next visit with alternative sleep provider in Dec.     Feel like she is unable to work right now due to her sx's.   Feeling overwhelmed. Exhaustion. Zero energy. Poor memory- hard to focus. Hard to multitask. Can't seem to keep a schedule. Hard to stay awake for work.   Mood- better now that off all her meds (thinks the adderall wasn't helpful). Hopeless but no self harm thoughts. \"Not worth living like this\" at some points in past- but not feeling this recently  Not eating regular meals. Hard time to get to the grocery store. Appetite is low. No significant weight loss, about 105# right now.   Sleep - sleeping good at night, not too many awakenings (had had some mid night insomnia but that has been better lately).     Did take fmla one year ago 6-10/2022. Worked 50% for few months and then slowly added back up.   Currently feel need to be fully off to try to manage life for the next 8 weeks for her next medication trial. Not sure when starting the new medicine.   Start Monday 10/2/23- 12/10/23. No work work    Has " "not been doing therapy recently, therapist left pratice. Feels \"done with this\".   Was a year wait for health psychologist.   Financially/energy wise only able to do few things.           Objective           Vitals:  No vitals were obtained today due to virtual visit.    Physical Exam   GENERAL: Healthy, alert and no distress  EYES: Eyes grossly normal to inspection.  No discharge or erythema, or obvious scleral/conjunctival abnormalities.  RESP: No audible wheeze, cough, or visible cyanosis.  No visible retractions or increased work of breathing.    SKIN: Visible skin clear. No significant rash, abnormal pigmentation or lesions.  NEURO: Cranial nerves grossly intact.  Mentation and speech appropriate for age.  PSYCH: Mentation appears normal, affect flat and mood is decreased.   judgement and insight intact, normal speech and appearance well-groomed.                Video-Visit Details    Type of service:  Video Visit     Originating Location (pt. Location): Home    Distant Location (provider location):  Off-site  Platform used for Video Visit: Feliberto      "

## 2023-09-27 ENCOUNTER — TELEPHONE (OUTPATIENT)
Dept: SLEEP MEDICINE | Facility: CLINIC | Age: 43
End: 2023-09-27
Payer: COMMERCIAL

## 2023-09-27 NOTE — TELEPHONE ENCOUNTER
Fax received from [facility] pharmacy indicating they cannot fill wakix. Prescription has been sent to [facility]. See phone encounter 9/25/23    Isela FRAGOSO RN  [facility]

## 2023-09-29 ENCOUNTER — TELEPHONE (OUTPATIENT)
Dept: SLEEP MEDICINE | Facility: CLINIC | Age: 43
End: 2023-09-29
Payer: COMMERCIAL

## 2023-09-29 DIAGNOSIS — G47.419 NARCOLEPSY WITHOUT CATAPLEXY(347.00): ICD-10-CM

## 2023-09-29 NOTE — TELEPHONE ENCOUNTER
Pending Prescriptions:                       Disp   Refills    amphetamine-dextroamphetamine (ADDERALL X*30 cap*0            Sig: Take 1 capsule (20 mg) by mouth daily       Patient on stim list  Medication removed from med list by PCP 9/26/23  Patient has filled Sunosi 8/9/23    Plan from 6/21 ov  Plan:  -Optimize effexor management with prescribing provider.   -Re-assess effects of Effexor on mood and send an update to Dr. Downey via [employer] in 4 weeks.  -If Effexor does not confer much benefit, recommend titrating off Effexor and Adderall completely (defer titration schedule to prescribing provider).  -Once completely off Effexor and Adderall, Shayan will be willing to go ahead and start Rx Sunosi (would not start while on effexor due to concerns for sterotonin syndrome)   -After starting Sunosi, start Wakix (ok to take both at the same time) with the ultimate expectation that the patient may need to be Rx sodium oxybate  -Patient is aware that she will likely be following up with a different provider however, Dr. Downey will continue to follow along and happy to Rx the medications as described above in the order above as she is waiting for a 6 month, or so, follow up with a different provider.   -Never drive if tired or sleepy.      Last Written Prescription Date:  Filled per  review 9/6/23  Last Fill Quantity: 30,   # refills: 0  Last Office Visit: 6/21/23  Future Office visit:   12/13/23    Routing refill request to provider for review/approval because:  Drug not on the [facility] or [facility] refill protocol or controlled substance    Per [employer] message 8/25/23 patient has discontinued Adderall    Routing to provider to address stim list. OK to remove patient until stable on new medication regimen?    Staff Addendum    Agree with removing adderral (which was supposed to be stopped) from stimulant calendar.

## 2023-10-02 ENCOUNTER — OFFICE VISIT (OUTPATIENT)
Dept: FAMILY MEDICINE | Facility: CLINIC | Age: 43
End: 2023-10-02
Payer: COMMERCIAL

## 2023-10-02 VITALS
OXYGEN SATURATION: 98 % | RESPIRATION RATE: 15 BRPM | HEART RATE: 88 BPM | DIASTOLIC BLOOD PRESSURE: 80 MMHG | TEMPERATURE: 97.1 F | BODY MASS INDEX: 19.31 KG/M2 | SYSTOLIC BLOOD PRESSURE: 127 MMHG | WEIGHT: 109 LBS | HEIGHT: 63 IN

## 2023-10-02 DIAGNOSIS — R53.83 FATIGUE, UNSPECIFIED TYPE: ICD-10-CM

## 2023-10-02 DIAGNOSIS — Z11.4 SCREENING FOR HIV (HUMAN IMMUNODEFICIENCY VIRUS): ICD-10-CM

## 2023-10-02 DIAGNOSIS — Z00.00 ROUTINE GENERAL MEDICAL EXAMINATION AT A HEALTH CARE FACILITY: Primary | ICD-10-CM

## 2023-10-02 DIAGNOSIS — G47.419 NARCOLEPSY WITHOUT CATAPLEXY(347.00): ICD-10-CM

## 2023-10-02 DIAGNOSIS — L81.9 PIGMENTED SKIN LESION OF UNCERTAIN NATURE: ICD-10-CM

## 2023-10-02 DIAGNOSIS — Z11.59 NEED FOR HEPATITIS C SCREENING TEST: ICD-10-CM

## 2023-10-02 DIAGNOSIS — F32.1 MODERATE MAJOR DEPRESSION (H): ICD-10-CM

## 2023-10-02 DIAGNOSIS — Z13.1 SCREENING FOR DIABETES MELLITUS: ICD-10-CM

## 2023-10-02 DIAGNOSIS — R51.9 NONINTRACTABLE EPISODIC HEADACHE, UNSPECIFIED HEADACHE TYPE: ICD-10-CM

## 2023-10-02 DIAGNOSIS — R41.3 MEMORY LOSS: ICD-10-CM

## 2023-10-02 DIAGNOSIS — R51.0 POSITIONAL HEADACHE: ICD-10-CM

## 2023-10-02 DIAGNOSIS — Z12.4 CERVICAL CANCER SCREENING: ICD-10-CM

## 2023-10-02 DIAGNOSIS — Z13.220 SCREENING CHOLESTEROL LEVEL: ICD-10-CM

## 2023-10-02 LAB
CHOLEST SERPL-MCNC: 193 MG/DL
FASTING STATUS PATIENT QL REPORTED: YES
GLUCOSE SERPL-MCNC: 86 MG/DL (ref 70–99)
HCV AB SERPL QL IA: NONREACTIVE
HDLC SERPL-MCNC: 80 MG/DL
HIV 1+2 AB+HIV1 P24 AG SERPL QL IA: NONREACTIVE
LDLC SERPL CALC-MCNC: 102 MG/DL
NONHDLC SERPL-MCNC: 113 MG/DL
TRIGL SERPL-MCNC: 53 MG/DL
TSH SERPL DL<=0.005 MIU/L-ACNC: 1.81 UIU/ML (ref 0.3–4.2)

## 2023-10-02 PROCEDURE — 99213 OFFICE O/P EST LOW 20 MIN: CPT | Mod: 25 | Performed by: FAMILY MEDICINE

## 2023-10-02 PROCEDURE — 80061 LIPID PANEL: CPT | Performed by: FAMILY MEDICINE

## 2023-10-02 PROCEDURE — 99396 PREV VISIT EST AGE 40-64: CPT | Performed by: FAMILY MEDICINE

## 2023-10-02 PROCEDURE — 86803 HEPATITIS C AB TEST: CPT | Performed by: FAMILY MEDICINE

## 2023-10-02 PROCEDURE — 87389 HIV-1 AG W/HIV-1&-2 AB AG IA: CPT | Performed by: FAMILY MEDICINE

## 2023-10-02 PROCEDURE — 82947 ASSAY GLUCOSE BLOOD QUANT: CPT | Performed by: FAMILY MEDICINE

## 2023-10-02 PROCEDURE — 84443 ASSAY THYROID STIM HORMONE: CPT | Performed by: FAMILY MEDICINE

## 2023-10-02 PROCEDURE — 36415 COLL VENOUS BLD VENIPUNCTURE: CPT | Performed by: FAMILY MEDICINE

## 2023-10-02 RX ORDER — LORAZEPAM 0.5 MG/1
.5-1 TABLET ORAL ONCE
Qty: 2 TABLET | Refills: 0 | Status: SHIPPED | OUTPATIENT
Start: 2023-10-02 | End: 2023-10-02

## 2023-10-02 ASSESSMENT — ENCOUNTER SYMPTOMS
CHILLS: 0
NAUSEA: 0
PALPITATIONS: 0
ARTHRALGIAS: 1
FREQUENCY: 0
MYALGIAS: 1
SHORTNESS OF BREATH: 0
BREAST MASS: 0
HEMATOCHEZIA: 0
DYSURIA: 0
COUGH: 0
WEAKNESS: 1
DIZZINESS: 0
HEADACHES: 1
CONSTIPATION: 0
HEMATURIA: 0
HEARTBURN: 0
DIARRHEA: 0
NERVOUS/ANXIOUS: 0
SORE THROAT: 0
PARESTHESIAS: 0
JOINT SWELLING: 0
ABDOMINAL PAIN: 0
FEVER: 0
EYE PAIN: 0

## 2023-10-02 ASSESSMENT — PAIN SCALES - GENERAL: PAINLEVEL: MODERATE PAIN (4)

## 2023-10-02 NOTE — PROGRESS NOTES
SUBJECTIVE:   CC: Jessica is an 42 year old who presents for preventive health visit.       2023     7:27 AM   Additional Questions   Roomed by Sakshi       Healthy Habits:     Getting at least 3 servings of Calcium per day:  NO    Bi-annual eye exam:  Yes    Dental care twice a year:  Yes    Sleep apnea or symptoms of sleep apnea:  Daytime drowsiness    Diet:  Regular (no restrictions)    Frequency of exercise:  None    Taking medications regularly:  Yes    Medication side effects:  Not applicable    Additional concerns today:  Yes    Will be going on work leave starting this week. See recent visit reviewing this    Has declined all screening tests. Doesn't want mammogram, pap, colon cancer screen, etc    Declines covid 19 booster and influenza vaccine        Social History     Tobacco Use    Smoking status: Former     Packs/day: 0.10     Years: 20.00     Pack years: 2.00     Types: Cigarettes     Start date: 1998     Quit date: 2022     Years since quittin.3    Smokeless tobacco: Never    Tobacco comments:     used to promote wakefulness   Substance Use Topics    Alcohol use: Yes     Comment: 3-4 drinks per week.             2023     9:40 PM   Alcohol Use   Prescreen: >3 drinks/day or >7 drinks/week? No     Reviewed orders with patient.  Reviewed health maintenance and updated orders accordingly - Yes      Breast Cancer Screening:    FHS-7:       6/3/2021     8:42 AM 10/3/2022     9:56 AM 2023     9:41 PM   Breast CA Risk Assessment (FHS-7)   Did any of your first-degree relatives have breast or ovarian cancer? Yes Yes Yes   Did any of your relatives have bilateral breast cancer? No No No   Did any man in your family have breast cancer? No No No   Did any woman in your family have breast and ovarian cancer? Yes Yes Yes   Did any woman in your family have breast cancer before age 50 y? No No No   Do you have 2 or more relatives with breast and/or ovarian cancer? No No No   Do you  have 2 or more relatives with breast and/or bowel cancer? No No No       Mammogram Screening - Offered annual screening and updated Health Maintenance for [place] plan based on risk factor consideration  Pertinent mammograms are reviewed under the imaging tab.    History of abnormal Pap smear: NO - age 30-65 PAP every 5 years with negative HPV co-testing recommended     Reviewed and updated as needed this visit by clinical staff   Tobacco  Allergies  Meds              Reviewed and updated as needed this visit by Provider                     Review of Systems   Constitutional:  Negative for chills and fever.   HENT:  Negative for congestion, ear pain, hearing loss and sore throat.    Eyes:  Negative for pain and visual disturbance.   Respiratory:  Negative for cough and shortness of breath.    Cardiovascular:  Negative for chest pain, palpitations and peripheral edema.   Gastrointestinal:  Negative for abdominal pain, constipation, diarrhea, heartburn, hematochezia and nausea.   Breasts:  Negative for tenderness, breast mass and discharge.   Genitourinary:  Negative for dysuria, frequency, genital sores, hematuria, pelvic pain, urgency, vaginal bleeding and vaginal discharge.   Musculoskeletal:  Positive for arthralgias and myalgias. Negative for joint swelling.   Skin:  Negative for rash.   Neurological:  Positive for weakness and headaches. Negative for dizziness and paresthesias.   Psychiatric/Behavioral:  Negative for mood changes. The patient is not nervous/anxious.      Getting headaches little more often now. Can happen if she doesn't eat. Start mild but then escalate to migraine like (feel weak, dizzy, leaning down will increase pressure in head, nausea, throbbing pain). Eating will resolve typically or ibuprofen will help.   Neck and shoulder pain also can trigger a different headache.   Poor memory,focus ongoing. Never has had brain scan as part of her biggs    Menses- regular, no concerns.  Denies risk of  "STI or pregnancy    Few cups of coffee in am.   Eat few times a day, snack. Eating more candy/sugary intake. One meal per day. Low protein intake. Does have protein powder that she isn't using most days    Has short term disability claim forms. Not sure if/when they need to be filled out- she will let me know.     Not exercising as she feels anything extra we will put her in bed and prevent her from doing her needed ADLs     OBJECTIVE:   /80 (BP Location: Right arm, Patient Position: Sitting, Cuff Size: Adult Regular)   Pulse 88   Temp 97.1  F (36.2  C) (Temporal)   Resp 15   Ht 1.6 m (5' 3\")   Wt 49.4 kg (109 lb)   LMP 09/15/2023 (Approximate)   SpO2 98%   BMI 19.31 kg/m    Physical Exam  GENERAL: healthy, alert and no distress  EYES: Eyes grossly normal to inspection, PERRL and conjunctivae and sclerae normal  HENT: ear canals and TM's normal, nose and mouth without ulcers or lesions  NECK: no adenopathy, no asymmetry, masses, or scars and thyroid normal to palpation  RESP: lungs clear to auscultation - no rales, rhonchi or wheezes  BREAST: normal without masses, tenderness or nipple discharge and no palpable axillary masses or adenopathy  CV: regular rate and rhythm, normal S1 S2, no S3 or S4, no murmur, click or rub, no peripheral edema and peripheral pulses strong  ABDOMEN: soft, nontender, no hepatosplenomegaly, no masses and bowel sounds normal   (female): deferred per patient request  MS: no gross musculoskeletal defects noted, no edema  SKIN: Right lateral foot near heel, purplish background patch with hard nodular erythemetous papules in inferior aspect  NEURO: Normal strength and tone, mentation intact and speech normal  PSYCH: mentation appears normal, affect flat, appearance well groomed, and speech is appropriate        Diagnostic Test Results:  Labs reviewed in Epic    ASSESSMENT/PLAN:   (Z00.00) Routine general medical examination at a health care facility  (primary encounter " diagnosis)    (R53.83) Fatigue, unspecified type  (G47.419) Narcolepsy without cataplexy(347.00)  Comment: Ongoing symptoms.  Still waiting for approval for Wakix  Plan: TSH with free T4 reflex     Per sleep clinic.  FMLA forms completed based on our conversation last visit and will be faxed for her next week      (F32.1) Moderate major depression (H)  Comment: Reviewed at recent visit.  Not on current medications  Plan: Encourage counseling and follow-up as needed    (R41.3) Memory loss  (R51.9) Nonintractable episodic headache, unspecified headache type  Comment: Given her severe nests of constellation of symptoms I do think it is reasonable to obtain a brain MRI to rule out cerebral pathology that could be contributing to her symptoms.  Plan: MR Brain w/o & w Contrast, LORazepam (ATIVAN)         0.5 MG tablet        She can continue OTC meds for headache control.  We discussed eating regularly    (L81.9) Pigmented skin lesion of uncertain nature  Comment: foot.  Possible birthmark but interesting hard nodules associated with it  Plan: Adult Dermatology Referral        We will get evaluation through dermatology    (Z11.4) Screening for HIV (human immunodeficiency virus)  Comment:   Plan: HIV Antigen Antibody Combo            (Z11.59) Need for hepatitis C screening test  Comment:   Plan: Hepatitis C Screen Reflex to HCV RNA Quant and         Genotype            (Z12.4) Cervical cancer screening  Comment:   Plan: Declines screening.  Risk of not screening discussed    (R51.0) Positional headache  Comment: As above  Plan: MR Brain w/o & w Contrast, LORazepam (ATIVAN)         0.5 MG tablet           (Z13.220) Screening cholesterol level  Comment:   Plan: Lipid panel reflex to direct LDL Fasting            (Z13.1) Screening for diabetes mellitus  Comment:   Plan: Glucose          Declines vaccinations and cancer screening test today.  She voices understanding of the purposes behind the screening test and understands  that early and treatable cancers could be missed without these types of screenings.          COUNSELING:  Reviewed preventive health counseling, as reflected in patient instructions       Regular exercise       Healthy diet/nutrition       Vision screening        She reports that she quit smoking about 15 months ago. Her smoking use included cigarettes. She started smoking about 25 years ago. She has a 2.00 pack-year smoking history. She has never used smokeless tobacco.          Liberty Alvarez MD  [facility]

## 2023-10-02 NOTE — PATIENT INSTRUCTIONS
Take lorazepam 20-30 minutes  prior to MRI scan. Have  to bring you home.   Call [facility] ([phone]) to schedule MRI brain.     Consider starting daily protein drink     Schedule with dermatology.         Preventive Health Recommendations  Female Ages 40 to 49    Yearly exam:   See your health care provider every year in order to  Review health changes.   Discuss preventive care.    Review your medicines if your doctor prescribed any.    Get a Pap test every three years (unless you have an abnormal result and your provider advises testing more often).    If you get Pap tests with HPV test, you only need to test every 5 years, unless you have an abnormal result. You do not need a Pap test if your uterus was removed (hysterectomy) and you have not had cancer.    You should be tested each year for STDs (sexually transmitted diseases), if you're at risk.   Ask your doctor if you should have a mammogram.    Have a colonoscopy (test for colon cancer) if someone in your family has had colon cancer or polyps before age 50.     Have a cholesterol test every 5 years.     Have a diabetes test (fasting glucose) after age 45. If you are at risk for diabetes, you should have this test every 3 years.    Shots: Get a flu shot each year. Get a tetanus shot every 10 years.     Nutrition:   Eat at least 5 servings of fruits and vegetables each day.  Eat whole-grain bread, whole-wheat pasta and brown rice instead of white grains and rice.  Get adequate Calcium and Vitamin D.      Lifestyle  Exercise at least 150 minutes a week (an average of 30 minutes a day, 5 days a week). This will help you control your weight and prevent disease.  Limit alcohol to one drink per day.  No smoking.   Wear sunscreen to prevent skin cancer.  See your dentist every six months for an exam and cleaning.

## 2023-10-03 NOTE — RESULT ENCOUNTER NOTE
Jessica,  It was a pleasure to see you in the office recently.   - Your cholesterol panel is normal except for a slightly low HDL (good cholesterol). Exercise is the best way to increase this level.   - The fasting blood glucose (diabetes screening test) was negative/normal.   - thyroid test looks good/normal.   -Hepatitis C and HIV screening tests were negative.  Please MyChart or call if you have any concerns or questions.   Sincerely,  Liberty Alvarez MD

## 2023-10-03 NOTE — TELEPHONE ENCOUNTER
New Medication Request        What medication are you requesting?: Wakix 4.45 mg    Reason for medication request: invalid transfer, new medication    Have you taken this medication before?: No    Controlled Substance Agreement on file:   CSA -- Patient Level:     [Media Unavailable] Controlled Substance Agreement - Non - Opioid - Scan on 7/12/2023 11:21 AM         Patient offered an appointment? No. [facility] calling    Preferred Pharmacy:     [address] [address]   [phone]  Fax [phone]    Could we send this information to you in [employer] or would you prefer to receive a phone call?:   No preference   Okay to leave a detailed message?: No at Other phone number:  [phone] [facility], Pharmacy. Reference # for PT [id number]

## 2023-10-04 ENCOUNTER — MYC MEDICAL ADVICE (OUTPATIENT)
Dept: FAMILY MEDICINE | Facility: CLINIC | Age: 43
End: 2023-10-04
Payer: COMMERCIAL

## 2023-10-04 NOTE — TELEPHONE ENCOUNTER
Per previous note patient would start Sunosi and then Wakix once completely off Effexor and Adderall.  Patient filled Sunosi on 8/9 and then filled her Adderall again on 9/6.  No more scripts for Adderall remain.  Patient requesting to start Wakix now.  Patient to be removed from STIM list as her medication dosages are being changed, can be added back once stable for 1 year.

## 2023-10-05 NOTE — TELEPHONE ENCOUNTER
Wakix rep stated patient can get medication through [facility].  Order pended for [facility] pharmacy in [place], [place].

## 2023-10-06 RX ORDER — PITOLISANT HYDROCHLORIDE 4.45 MG/1
TABLET, FILM COATED ORAL
Qty: 62 TABLET | Refills: 4 | Status: SHIPPED | OUTPATIENT
Start: 2023-10-06 | End: 2023-11-14

## 2023-10-19 ENCOUNTER — ANCILLARY PROCEDURE (OUTPATIENT)
Dept: MRI IMAGING | Facility: CLINIC | Age: 43
End: 2023-10-19
Attending: FAMILY MEDICINE
Payer: COMMERCIAL

## 2023-10-19 DIAGNOSIS — R41.3 MEMORY LOSS: ICD-10-CM

## 2023-10-19 DIAGNOSIS — R51.9 NONINTRACTABLE EPISODIC HEADACHE, UNSPECIFIED HEADACHE TYPE: ICD-10-CM

## 2023-10-19 DIAGNOSIS — R51.0 POSITIONAL HEADACHE: ICD-10-CM

## 2023-10-19 PROCEDURE — A9585 GADOBUTROL INJECTION: HCPCS | Mod: JZ | Performed by: STUDENT IN AN ORGANIZED HEALTH CARE EDUCATION/TRAINING PROGRAM

## 2023-10-19 PROCEDURE — 70553 MRI BRAIN STEM W/O & W/DYE: CPT | Mod: GC | Performed by: STUDENT IN AN ORGANIZED HEALTH CARE EDUCATION/TRAINING PROGRAM

## 2023-10-19 RX ORDER — GADOBUTROL 604.72 MG/ML
7.5 INJECTION INTRAVENOUS ONCE
Status: COMPLETED | OUTPATIENT
Start: 2023-10-19 | End: 2023-10-19

## 2023-10-19 RX ADMIN — GADOBUTROL 5 ML: 604.72 INJECTION INTRAVENOUS at 11:22

## 2023-10-19 NOTE — RESULT ENCOUNTER NOTE
Jessica,  I am happy to report your brain overall looks quite healthy.  No findings were noted to explain your memory changes.  You did have some inflammation/fluid seen in the small frontal and ethmoid sinuses.  Certainly this can represent a sinus infection which could cause headache.  If you are still noting ongoing headache it may be worth a trial of an antibiotic for the sinuses to see if this will clear things up.  Please message me back if he would like to proceed with this.  Please MyChart or call if you have any concerns or questions.   Sincerely,  Liberty Alvarez MD

## 2023-11-08 DIAGNOSIS — R09.81 NASAL CONGESTION: ICD-10-CM

## 2023-11-08 RX ORDER — FLUTICASONE PROPIONATE 50 MCG
1 SPRAY, SUSPENSION (ML) NASAL DAILY
Qty: 16 G | Refills: 0 | Status: SHIPPED | OUTPATIENT
Start: 2023-11-08 | End: 2023-12-20

## 2023-11-14 ENCOUNTER — VIRTUAL VISIT (OUTPATIENT)
Dept: FAMILY MEDICINE | Facility: CLINIC | Age: 43
End: 2023-11-14
Payer: COMMERCIAL

## 2023-11-14 DIAGNOSIS — F41.9 ANXIETY: ICD-10-CM

## 2023-11-14 DIAGNOSIS — G47.419 NARCOLEPSY WITHOUT CATAPLEXY(347.00): Primary | ICD-10-CM

## 2023-11-14 DIAGNOSIS — F32.1 MODERATE MAJOR DEPRESSION (H): ICD-10-CM

## 2023-11-14 DIAGNOSIS — R53.83 FATIGUE, UNSPECIFIED TYPE: ICD-10-CM

## 2023-11-14 PROCEDURE — 99214 OFFICE O/P EST MOD 30 MIN: CPT | Mod: VID | Performed by: FAMILY MEDICINE

## 2023-11-14 ASSESSMENT — PATIENT HEALTH QUESTIONNAIRE - PHQ9: SUM OF ALL RESPONSES TO PHQ QUESTIONS 1-9: 21

## 2023-11-14 NOTE — PROGRESS NOTES
Jessica is a 42 year old who is being evaluated via a billable video visit.      How would you like to obtain your AVS? Isiahhart  If the video visit is dropped, the invitation should be resent by: Text to cell phone: [phone]  Will anyone else be joining your video visit? 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  Unfortunately, the Wakix was never approved in part because of communication between the pharmacy and the sleep clinic.  Her current sleep provider is leaving and she has an appointment with a new provider next month.  She has been off work and feels without significant change she would not be able to return to work in the near future.  She is worried that she will lose her job and insurance as her FMLA leave will be used up by December.  I recommended she talk with her HR department about long-term disability options.  I will also have my care coordinator contact her for help with insurance resources and mental health resources.  - Primary Care - Care Coordination Referral; Future    Moderate major depression (H)  Anxiety  Symptoms are quite escalated right now.  More feelings of hopelessness the last month that are somewhat better now.  Has not had a suicide plan but has thoughts of not wanting to be here.  However, notes she would not harm herself as she wants to stay for her dog.  She also has crisis resource phone numbers and agrees to reach out if she started to have worsening SI/plan.  Would like to start another antidepressant.  She has been on several in the past without good response.  We will trial sertraline. Reviewed onset of action of meds, common side effects and plan for close f/u. Encouraged call to clinic if symptoms worsening or adverse reactions.   Also, encouraged her to get back to psychiatry and therapy.  Reviewed that she could try with her previous  clinic with different provider or consider [facility].  She is somewhat hesitant to schedule because of her concerns with insurance but I encouraged her to to at least get this appointment set up well insurance concerns are in flux.  - Primary Care - Care Coordination Referral; Future  - sertraline (ZOLOFT) 50 MG tablet; Take 0.5 tablets (25 mg) by mouth daily for 14 days, THEN 1 tablet (50 mg) daily.      I spent a total of 37 minutes on the day of the visit.   Time spent by me doing chart review, history and exam, documentation and further activities per the note       Depression Screening Follow Up        11/14/2023     8:12 AM   PHQ   PHQ-9 Total Score 21   Q9: Thoughts of better off dead/self-harm past 2 weeks Several days           Follow Up      Follow Up Actions Taken  Referred patient back to mental health provider    Discussed the following ways the patient can remain in a safe environment:   She has no weapons in her home.  Does verbally contract for safety and will use her crisis numbers if symptoms escalate  Patient Instructions   Contact your  about long-term disability.     Schedule with psychologist and psychiatrist. Consider [facility]    Med check with me in 4 weeks    Sertraline 1/2 tablet for 2 weeks, then full tablet if tolerating. Take in morning.     Care coordinator will call in the next several days to help with mental health and insurance resources.             Liberty Alvarez MD  [facility]    Jett Lopez is a 42 year old, presenting for the following health issues:  Follow Up (FMLA )      11/14/2023     8:11 AM   Additional Questions   Roomed by Lucero RAMOS   Accompanied by self         11/14/2023     8:11 AM   Patient Reported Additional Medications   Patient reports taking the following new medications None       History of Present Illness       Reason for visit:  FMLA/disability follow up    She eats 2-3  "servings of fruits and vegetables daily.She consumes 0 sweetened beverage(s) daily.She exercises with enough effort to increase her heart rate 9 or less minutes per day.  She exercises with enough effort to increase her heart rate 3 or less days per week.        Has been on fmla leave due to extreme fatigue affecting her ability to focus and function at work. Leave 10/2/23-12/10/23    Reports not much has changed.   Sleeping a lot. Feeling territble most days.   Rx for Wakixx was cancelled.   Has an appt with other sleep provider in Dec.     Doesn't feel see can return to work  Worried about losing her insurance   Feels continued poor focus/concentration  Can do some minimal tasks during the day. Get distracted or get overwhelmed with it.   Also anxiety high lately.   Open to starting back on anti-depressant    [facility], [facility] health). Had intake appt 10/31 but never heard back from their office to make the next visit. However, didn't feel it was going to be helpful     Last psych provider left office. Got set up with different provider then changed to [facility] and stopped when had to stop the anti-depressant due to plans for med that sleep clinic was using. Now provider that was at [facility] has left.   Last Rx on venlafaxine- felt it was bad \"irritable on med\"  Wellbutrin-   Fluoxetine- worked well for a while but thinks it was causing insomnia.   Mirtazapine  Escitalopram  Trazodone  Gabapentin for anxiety  \"All were bad\"  Hydroxyzine knocked her out too much.     Oct was \"pretty rough\" for SI. \"Not wanting to be here\". No plan to harm herself. Wants to stay here for her dog.     None the last two weeks.   Health psychology was booked out one year.     Feels fatigue and ability to do things are progressive and becoming more incapacitating         6/21/2023     8:34 AM 9/25/2023     9:30 PM 11/14/2023     8:12 AM   PHQ-9 SCORE   PHQ-9 Total Score MyChart  " 19 (Moderately severe depression)    PHQ-9 Total Score 18 19 21         2/8/2023    10:02 AM 2/8/2023    11:09 AM 3/6/2023     7:38 PM   BYRON-7 SCORE   Total Score 16 (severe anxiety) 17 (severe anxiety) 19 (severe anxiety)   Total Score 16 17 19           [facility]         Review of Systems         Objective           Vitals:  No vitals were obtained today due to virtual visit.    Physical Exam   GENERAL: Healthy, alert and no distress  EYES: Eyes grossly normal to inspection.  No discharge or erythema, or obvious scleral/conjunctival abnormalities.  RESP: No audible wheeze, cough, or visible cyanosis.  No visible retractions or increased work of breathing.    SKIN: Visible skin clear. No significant rash, abnormal pigmentation or lesions.  NEURO: Cranial nerves grossly intact.  Mentation and speech appropriate for age.  PSYCH: mentation appears normal, affect flat, tearful, judgement and insight intact, and appearance well groomed              Video-Visit Details    Type of service:  Video Visit     Originating Location (pt. Location): Home    Distant Location (provider location):  Off-site  Platform used for Video Visit: Feliberto

## 2023-11-14 NOTE — PATIENT INSTRUCTIONS
Contact your  about long-term disability.     Schedule with psychologist and psychiatrist. Consider [facility]    Med check with me in 4 weeks    Sertraline 1/2 tablet for 2 weeks, then full tablet if tolerating. Take in morning.     Care coordinator will call in the next several days to help with mental health and insurance resources.

## 2023-11-15 ENCOUNTER — PATIENT OUTREACH (OUTPATIENT)
Dept: CARE COORDINATION | Facility: CLINIC | Age: 43
End: 2023-11-15
Payer: COMMERCIAL

## 2023-11-15 NOTE — PROGRESS NOTES
Community Health Worker Initial Outreach    CHW Initial Information Gathering:  Referral Source: PCP  Preferred Hospital: [facility], [place]  [phone]  Preferred Urgent Care: [facility] [place], [phone]  Current living arrangement:: I live alone  Type of residence:: Private home - stairs  Community Resources: None  Supplies Currently Used at Home: None  Equipment Currently Used at Home: none  Informal Support system:: Family  No PCP office visit in Past Year: No  Transportation means:: Regular car  CHW Additional Questions  If ED/Hospital discharge, follow-up appointment scheduled as recommended?: N/A  Medication changes made following ED/Hospital discharge?: N/A  MyChart active?: Yes  Patient sent Social Determinants of Health questionnaire?: No    Patient accepts CC: Yes. Patient scheduled for assessment with CC JESUS on 11/17 at 3pm. Patient noted desire to discuss mental health resources.     Nargis Floresover, [place], Nahid Morelos [place] and [facility]  [phone]       32.2

## 2023-11-17 ENCOUNTER — PATIENT OUTREACH (OUTPATIENT)
Dept: NURSING | Facility: CLINIC | Age: 43
End: 2023-11-17
Payer: COMMERCIAL

## 2023-11-17 ENCOUNTER — LAB (OUTPATIENT)
Dept: LAB | Facility: CLINIC | Age: 43
End: 2023-11-17
Payer: COMMERCIAL

## 2023-11-17 DIAGNOSIS — R79.89 HIGH SERUM TRANSFERRIN SATURATION: ICD-10-CM

## 2023-11-17 DIAGNOSIS — R79.0 RAISED SERUM IRON: ICD-10-CM

## 2023-11-17 LAB
BASOPHILS # BLD AUTO: 0.1 10E3/UL (ref 0–0.2)
BASOPHILS NFR BLD AUTO: 1 %
EOSINOPHIL # BLD AUTO: 0.1 10E3/UL (ref 0–0.7)
EOSINOPHIL NFR BLD AUTO: 3 %
ERYTHROCYTE [DISTWIDTH] IN BLOOD BY AUTOMATED COUNT: 11.8 % (ref 10–15)
FERRITIN SERPL-MCNC: 38 NG/ML (ref 6–175)
HCT VFR BLD AUTO: 40.2 % (ref 35–47)
HGB BLD-MCNC: 13.4 G/DL (ref 11.7–15.7)
IMM GRANULOCYTES # BLD: 0 10E3/UL
IMM GRANULOCYTES NFR BLD: 0 %
IRON BINDING CAPACITY (ROCHE): 330 UG/DL (ref 240–430)
IRON SATN MFR SERPL: 52 % (ref 15–46)
IRON SERPL-MCNC: 171 UG/DL (ref 37–145)
LYMPHOCYTES # BLD AUTO: 1.5 10E3/UL (ref 0.8–5.3)
LYMPHOCYTES NFR BLD AUTO: 35 %
MCH RBC QN AUTO: 29.1 PG (ref 26.5–33)
MCHC RBC AUTO-ENTMCNC: 33.3 G/DL (ref 31.5–36.5)
MCV RBC AUTO: 87 FL (ref 78–100)
MONOCYTES # BLD AUTO: 0.4 10E3/UL (ref 0–1.3)
MONOCYTES NFR BLD AUTO: 9 %
NEUTROPHILS # BLD AUTO: 2.2 10E3/UL (ref 1.6–8.3)
NEUTROPHILS NFR BLD AUTO: 52 %
PLATELET # BLD AUTO: 238 10E3/UL (ref 150–450)
RBC # BLD AUTO: 4.61 10E6/UL (ref 3.8–5.2)
WBC # BLD AUTO: 4.2 10E3/UL (ref 4–11)

## 2023-11-17 PROCEDURE — 85025 COMPLETE CBC W/AUTO DIFF WBC: CPT

## 2023-11-17 PROCEDURE — 36415 COLL VENOUS BLD VENIPUNCTURE: CPT

## 2023-11-17 PROCEDURE — 83540 ASSAY OF IRON: CPT

## 2023-11-17 PROCEDURE — 82728 ASSAY OF FERRITIN: CPT

## 2023-11-17 PROCEDURE — 83550 IRON BINDING TEST: CPT

## 2023-11-17 ASSESSMENT — ACTIVITIES OF DAILY LIVING (ADL): DEPENDENT_IADLS:: INDEPENDENT

## 2023-11-17 NOTE — LETTER
[facility] CARE COORDINATION  [address]  [place] [address]    November 17, 2023    Jessica Mello  [address] [place] [address]      Dear Jessica,    I am a clinic care coordinator who works with Liberty Alvarez MD with the [facility]. I wanted to thank you for spending the time to talk with me.  Below is a description of clinic care coordination and how I can further assist you.       The clinic care coordination team is made up of a registered nurse, , financial resource worker and community health worker who understand the health care system. The goal of clinic care coordination is to help you manage your health and improve access to the health care system. Our team works alongside your provider to assist you in determining your health and social needs. We can help you obtain health care and community resources, providing you with necessary information and education. We can work with you through any barriers and develop a care plan that helps coordinate and strengthen the communication between you and your care team.  Our services are voluntary and are offered without charge to you personally.    Please feel free to contact me with any questions or concerns regarding care coordination and what we can offer.      We are focused on providing you with the highest-quality healthcare experience possible.    Sincerely,     Nahomy Thomas, [facility]  Social Work Primary Care Clinic Care Coordinator  [facility]  [phone]  [email or web address]

## 2023-11-17 NOTE — PROGRESS NOTES
"Clinic Care Coordination Contact  Clinic Care Coordination Contact  OUTREACH    Referral Information:  Referral Source: PCP    Primary Diagnosis: Psychosocial    Chief Complaint   Patient presents with    Clinic Care Coordination - Initial     JESUS MELENDEZ     CHW Note: Patient noted desire to discuss mental health resources.    SW Assessment: JESUS MELENDEZ outreached to patient for scheduled assessment. Patient accepts care coordination.     Patient reports she is getting treatment for her sleep disorder and it is difficult to maintain work on top of that. Patient reports she has been on FMLA and will not be able to return to work once it is up on Dec 10th. Patient has questions about insurance, reports she has been in contact with HR and will follow up with them early next week. JESUS CC encouraged patient to discuss long term disability and keeping insurance coverage with her employer. Patient reports currently getting STD and she has coverage for LTD.     Patient asked if she should apply for SSDI, JESUS CC encouraged her to contact the disability hub as I cannot tell if she should or shouldn't.     Patient and JESUS CC discussed mental health resources. Patient reports she does not want to get started with a new therapist as she is afraid she will lose her insurance coverage and she is tired of \"starting over\" with new providers. JESUS CC suggested [place] warm line and the walk in counseling center, patient was open to these options. JESUS MELENDEZ sent [employer] message with information for the disability hub, [place] Warm Line, [facility], and the [facility] Crisis Line.      [place] Utilization:   Clinic Utilization  Difficulty keeping appointments:: No  Compliance Concerns: No  No-Show Concerns: No  No PCP office visit in Past Year: No  Utilization      No Show Count (past year)  0             ED Visits  0             Hospital Admissions  0                    Current as of: 11/17/2023  2:49 AM              Clinical " Concerns:  Current Medical Concerns:  Sleep disorder    Current Behavioral Concerns: N/A    Education Provided to patient: Role of care coordination, resources for mental health support, disability hub       Health Maintenance Reviewed: Not assessed  Clinical Pathway: None    Medication Management:  Medication review status: Medications not reviewed due to nature of call.     Functional Status:  Dependent ADLs:: Independent  Dependent IADLs:: Independent  Bed or wheelchair confined:: No  Mobility Status: Independent    Living Situation:  Current living arrangement:: I live alone  Type of residence:: Private home - stairs    Lifestyle & Psychosocial Needs:    Social Determinants of Health     Food Insecurity: Low Risk  (9/25/2023)    Food Insecurity     Within the past 12 months, did you worry that your food would run out before you got money to buy more?: No     Within the past 12 months, did the food you bought just not last and you didn t have money to get more?: No   Depression: At risk (11/14/2023)    PHQ-2     PHQ-2 Score: 6   Housing Stability: Low Risk  (9/25/2023)    Housing Stability     Do you have housing? : Yes     Are you worried about losing your housing?: No   Tobacco Use: Medium Risk (11/14/2023)    Patient History     Smoking Tobacco Use: Former     Smokeless Tobacco Use: Never     Passive Exposure: Not on file   Financial Resource Strain: Low Risk  (9/25/2023)    Financial Resource Strain     Within the past 12 months, have you or your family members you live with been unable to get utilities (heat, electricity) when it was really needed?: No   Alcohol Use: Not At Risk (3/8/2022)    AUDIT-C     Frequency of Alcohol Consumption: 2-4 times a month     Average Number of Drinks: 1 or 2     Frequency of Binge Drinking: Never   Transportation Needs: Low Risk  (9/25/2023)    Transportation Needs     Within the past 12 months, has lack of transportation kept you from medical appointments, getting your  medicines, non-medical meetings or appointments, work, or from getting things that you need?: No   Physical Activity: Insufficiently Active (3/8/2022)    Exercise Vital Sign     Days of Exercise per Week: 2 days     Minutes of Exercise per Session: 20 min   Interpersonal Safety: Low Risk  (10/2/2023)    Interpersonal Safety     Do you feel physically and emotionally safe where you currently live?: Yes     Within the past 12 months, have you been hit, slapped, kicked or otherwise physically hurt by someone?: No     Within the past 12 months, have you been humiliated or emotionally abused in other ways by your partner or ex-partner?: No   Stress: Stress Concern Present (3/8/2022)    [employer] - Occupational Stress Questionnaire     Feeling of Stress : Very much   Social Connections: Socially Isolated (3/8/2022)    Social Connection and Isolation Panel [NHANES]     Frequency of Communication with Friends and Family: Once a week     Frequency of Social Gatherings with Friends and Family: Once a week     Attends Latter-day Services: Never     Active Member of Clubs or Organizations: Yes     Attends Club or Organization Meetings: Not on file     Marital Status: Never      Diet:: Regular  Inadequate nutrition (GOAL):: No  Tube Feeding: No  Inadequate activity/exercise (GOAL):: No  Significant changes in sleep pattern (GOAL): No  Transportation means:: Regular car     Latter-day or spiritual beliefs that impact treatment:: No  Mental health DX:: Yes  Mental health DX how managed:: None  Mental health management concern (GOAL):: Yes  Informal Support system:: Family      Resources and Interventions:  Current Resources:   Community Resources: None  Supplies Currently Used at Home: None  Equipment Currently Used at Home: none  Employment Status: other (see comments) (on FMLA)    Advance Care Plan/Directive  Advanced Care Plans/Directives on file:: No    Referrals Placed: None     Care  Plan:  Care Plan: Mental Health       Problem: Mental Health Symptoms Need Improvement       Goal: Reach out to Mental Health Lines as Needed       Start Date: 11/17/2023 Expected End Date: 11/17/2024    Note:     Barriers: Cost of care   Strengths: willing to receive resources  Patient expressed understanding of goal: yes    Action steps to achieve this goal:  1. I will contact resources provided when needed.  2. I will utilize crisis resources if needed  3. I will work with care coordination for ongoing support and resources.                             Care Plan: Financial Wellbeing       Problem: Patient expresses financial resource strain       Goal: Create an action plan to increase financial stability       Start Date: 11/17/2023 Expected End Date: 5/17/2024    Note:     Barriers: DANNA at work, insurance concerns  Strengths: Working with HR  Patient expressed understanding of goal: Yes  Action steps to achieve this goal:  1. I will discuss disability and insurance options with my employer.  2. I will reach out to the disability hub to discuss social security.   3. I will work with care coordination for ongoing resources.                                Patient/Caregiver understanding: Pt reports understanding and denies any additional questions or concerns at this times. JESUS MELENDEZ engaged in [employer] communication during encounter.    Outreach Frequency: weekly, more frequently as needed  Future Appointments                In 4 days Nory Costello DO M [facility] RID    In 3 weeks Liberty Alvarez MD M [facility] CL    In 3 weeks Goltz, Bennett Ezra, PA-C M [facility] Sleep Centers [facility] Sle    In 5 months Maci Saldana APRN CNP M [facility] [place] CLIN            Plan: Patient was provided with this writer's contact information and encouraged to call with any questions or concerns.    JESUS MELENDEZ  will send resources, care coordination introduction letter, and care plan to patient via Client Outlook. JESUS CC will outreach again next week to follow up on patient's call with her employer.     Nahomy Thomas, [facility]  Social Work Primary Care Clinic Care Coordinator  [facility]  [phone]  [email or web address]

## 2023-11-17 NOTE — LETTER
[facility] CARE COORDINATION  [address]  [place] [address]    November 17, 2023      Jessica Mello  [address] [place] [address]      Dear Shai Lopez is a Patient Centered Plan of Care for your enrollment in Care Coordination. Please let us know if you have additional questions, concerns, or goals that we can assist with.    Sincerely,    Nahomy Thomas [facility]  Social Work Primary Care Clinic Care Coordinator  [facility]  [phone]  [email or web address]  Patient Centered Plan of Care  About Me:        Patient Name:  Jessica MIRZA Renbasilio    YOB: 1980  Age:         42 year old   [place] MRN:    [id number] Telephone Information:  Home Phone [phone]   Mobile [phone]       Address:  [address]  [place] [address] Email address:  [email or web address]      Emergency Contact(s)    Name Relationship Lgl Grd Work Phone Home Phone Mobile Phone   1. MAEGAN LYNN Sister   [phone]            Primary language:  English     needed? No   [place] [facility]:  [phone] op. 1  Other communication barriers:None    Preferred Method of Communication:     Current living arrangement: I live alone    Mobility Status/ Medical Equipment: Independent    Health Maintenance  Health Maintenance Reviewed:   Health Maintenance Due   Topic Date Due    DEPRESSION ACTION PLAN  Never done    HEPATITIS B IMMUNIZATION (1 of 3 - 3-dose series) Never done    PAP  Never done    INFLUENZA VACCINE (1) 09/01/2023    COVID-19 Vaccine (3 - 2023-24 season) 09/01/2023       My Access Plan  Medical Emergency 911   Primary Clinic Line [facility] - [phone]   24 Hour Appointment Line [phone] or  [phone]) (toll-free)   24 Hour Nurse Line [phone] (toll-free)   Preferred Urgent Care [facility] -  Kiarra Askew, [phone]     [facility], [place]  [phone]     Preferred Pharmacy [id number] - [place], [place] - [address] AT [facility] [address]     Behavioral Health Crisis Line The National Suicide Prevention Lifeline at 1-234.649.2089 or Text/Call 988           My Care Team Members  Patient Care Team         Relationship Specialty Notifications Start End    Liberty Alvarez MD PCP - General Family Medicine  12/21/21     Phone: [phone] Fax: [phone] [address] [facility] [address] [facility] [address]    Liberty Alvarez MD Assigned PCP   5/6/21     Phone: [phone] Fax: [phone] [address] [facility] [address] [facility] [address] [facility] [address]    Jordan Louis MD Assigned Rheumatology Provider   2/27/22     Phone: [phone] Fax: [phone] [address] [facility] [address]    Farshad Duke MD Assigned Musculoskeletal Provider   7/16/22     Phone: [phone] Fax: [phone]         [address] [facility] [address]    Nory Costello DO Assigned Cancer Care Provider   10/22/22     Phone: [phone] Fax: [phone] [address]  [address] [address]    Jessica Grullon, RN Specialty Care Coordinator Hematology & Oncology Admissions 11/19/22     Phone: [phone]         Mary Copeland NP Assigned Neuroscience Provider   3/18/23     Phone: [phone] Fax: [phone]         [facility] [address]    Brooks Hale MD Assigned Surgical Provider   3/25/23     Phone: [phone] Fax: [phone] [address] [facility] [address]    Joseph Delaney LMFT Assigned Behavioral Health Provider   5/27/23      [address] [place] [address]    Sotero Downey MD Assigned Sleep Provider   7/8/23     Phone: [phone] Fax: [phone] [address] [facility] [address] [facility] [address]    Trisha Samson MD Assigned  Allergy Provider   7/22/23     Phone: [phone] Fax: [phone]         [facility] [facility] [address]    Maci Saldana APRN CNP Nurse Practitioner Dermatology  10/4/23     Phone: [phone] Fax: [phone]         [address] [facility] [address]    Nahomy Thomas LICSW Lead Care Coordinator  [facility] 11/15/23                 My Care Plans  Self Management and Treatment Plan    Care Plan  Care Plan: Mental Health       Problem: Mental Health Symptoms Need Improvement       Goal: Reach out to Mental Health Lines as Needed       Start Date: 11/17/2023 Expected End Date: 11/17/2024    Note:     Barriers: Cost of care   Strengths: willing to receive resources  Patient expressed understanding of goal: yes    Action steps to achieve this goal:  1. I will contact resources provided when needed.  2. I will utilize crisis resources if needed  3. I will work with care coordination for ongoing support and resources.                             Care Plan: Financial Wellbeing       Problem: Patient expresses financial resource strain       Goal: Create an action plan to increase financial stability       Start Date: 11/17/2023 Expected End Date: 5/17/2024    Note:     Barriers: DANNA at work, insurance concerns  Strengths: Working with HR  Patient expressed understanding of goal: Yes  Action steps to achieve this goal:  1. I will discuss disability and insurance options with my employer.  2. I will reach out to the disability hub to discuss social security.   3. I will work with care coordination for ongoing resources.                                Action Plans on File:      Advance Care Plans/Directives:   Advanced Care Plan/Directives on file:   No    Discussed with patient/caregiver(s): No data recorded           My Medical and Care Information  Problem List   Patient Active Problem List   Diagnosis    Smoker    Other depression    Anxiety    Fatigue, unspecified type    Narcolepsy without  cataplexy(347.00)    Major depression, recurrent (H24)    Abnormal serum iron level    Nonallergic rhinitis      Current Medications and Allergies:    Current Outpatient Medications   Medication    cholecalciferol (VITAMIN D3) 25 mcg (1000 units) capsule    fluticasone (FLONASE) 50 MCG/ACT nasal spray    hydrOXYzine (ATARAX) 25 MG tablet    Multiple Vitamin (MULTIVITAMIN ADULT PO)    sertraline (ZOLOFT) 50 MG tablet     No current facility-administered medications for this visit.     Care Coordination Start Date: 11/14/2023   Frequency of Care Coordination: weekly, more frequently as needed     Form Last Updated: 11/17/2023

## 2023-11-21 ENCOUNTER — ONCOLOGY VISIT (OUTPATIENT)
Dept: ONCOLOGY | Facility: CLINIC | Age: 43
End: 2023-11-21
Attending: INTERNAL MEDICINE
Payer: COMMERCIAL

## 2023-11-21 ENCOUNTER — PATIENT OUTREACH (OUTPATIENT)
Dept: CARE COORDINATION | Facility: CLINIC | Age: 43
End: 2023-11-21

## 2023-11-21 VITALS
HEART RATE: 102 BPM | RESPIRATION RATE: 16 BRPM | TEMPERATURE: 97.6 F | OXYGEN SATURATION: 100 % | WEIGHT: 116.6 LBS | DIASTOLIC BLOOD PRESSURE: 85 MMHG | SYSTOLIC BLOOD PRESSURE: 133 MMHG | BODY MASS INDEX: 20.65 KG/M2

## 2023-11-21 DIAGNOSIS — R79.0 RAISED SERUM IRON: ICD-10-CM

## 2023-11-21 DIAGNOSIS — R79.89 HIGH SERUM TRANSFERRIN SATURATION: Primary | ICD-10-CM

## 2023-11-21 PROCEDURE — 99214 OFFICE O/P EST MOD 30 MIN: CPT | Performed by: INTERNAL MEDICINE

## 2023-11-21 ASSESSMENT — PAIN SCALES - GENERAL: PAINLEVEL: MILD PAIN (3)

## 2023-11-21 NOTE — PROGRESS NOTES
Social Work - Distress Screen Intervention  [facility]    Identified Concern and Score from Distress Screenin. How concerned are you about your ability to eat? 0     2. How concerned are you about unintended weight loss or your current weight? 0     3. How concerned are you about feeling depressed or very sad?  7     4. How concerned are you about feeling anxious or very scared?  (!) 9     5. Do you struggle with the loss of meaning and rosanna in your life?  (!) A great deal     6. How concerned are you about work and home life issues that may be affected by your cancer?  (!) 10     7. How concerned are you about knowing what resources are available to help you?  6     8. Do you currently have what you would describe as Buddhism or spiritual struggles? Not at all     9. If you want to be contacted by one of our professionals, I can send a message to them right now.  No data recorded     Date of Distress Screen: 23  Data: At time of last visit, patient scored positive on distress screening.  outreached to patient today to follow up on elevated distress and introduce psychosocial services and support.  Intervention/Education provided:  contacted patient by phone to discuss distress screening results. Left voicemail message    Follow-up Required:  to remain available for support and await return call from patient    Vianca Lott, MSW, LICSW, OSW-C  Clinical - Adult Oncology  She/Her/Hers  Phone: [phone]  [facility]: M, Thu  *every other Tue, 8am-4:30pm  [facility]: W, F, *every other Tue, 8am-4:30pm

## 2023-11-21 NOTE — LETTER
11/21/2023         RE: Jessica Mello  [address]  [place] [address]        Dear Colleague,    Thank you for referring your patient, Jessica Mello, to the [facility]. Please see a copy of my visit note below.    [facility]    Hematology/Oncology Established Patient Note      Today's Date: 11/21/23    Reason for Consultation: Elevated iron, fatigue  Referring Provider: Liberty Alvarez MD      HISTORY OF PRESENT ILLNESS: Jessica Mello is a 43 year old female who is referred for elevated iron. Past medical history is significant for depression and chronic fatigue.     She has chronic fatigue. She has to sleep every few hours. She was previously on disability, but now is working part-time as a . She has difficulty doing ADLs due to fatigue. She has had rheumatologic evaluation previously for an elevated KEIRY. She denies history of anemia. She has never been on iron supplementation or OTC vitamins.     Patient has limited labs within our system. Record review shows normal CBC. She was seen by a sleep specialist who ordered iron studies. These have been tested in July and October 2022 showing low normal ferritin stores and elevated iron.     Last year, she reports weight loss of 5-10 lbs, of which she has gained back. No fever, drenching night sweats, LAD. No gross evidence of bleed.    Menses started at the age of 17 yo. Regular, not excessively heavy.     Medication list review: she has been on adderall and sonata for a month. Prozac one year. Atarax just a few times a week.    Family history: Mother diagnosed with breast cancer, age 65. No family history of CRC. No family history of liver disease/phlebotomy.     Cancer screening: pap smear overdue. Not of age yet for mammograms or screening colonoscopy.      INTERIM HISTORY:  No unintentional weight loss, drenching night sweats, LAD, fever.     LMP 11/7/23.        REVIEW OF SYSTEMS:   A 14 point ROS was reviewed with pertinent positives and negatives in the HPI.        HOME MEDICATIONS:  Current Outpatient Medications   Medication Sig Dispense Refill     cholecalciferol (VITAMIN D3) 25 mcg (1000 units) capsule Take 2,000 Int'l Units by mouth       fluticasone (FLONASE) 50 MCG/ACT nasal spray use 1 spray in each nostril daily 16 g 0     hydrOXYzine (ATARAX) 25 MG tablet Take 1-2 tablets (25-50 mg) by mouth 3 times daily as needed for other (sleep/anxiety) 60 tablet 1     Multiple Vitamin (MULTIVITAMIN ADULT PO) Take 1 tablet by mouth daily With iron. Takes intermittently       sertraline (ZOLOFT) 50 MG tablet Take 0.5 tablets (25 mg) by mouth daily for 14 days, THEN 1 tablet (50 mg) daily. 30 tablet 0         ALLERGIES:  Allergies   Allergen Reactions     Bupropion Unknown     Modafinil Unknown         PAST MEDICAL HISTORY:  Past Medical History:   Diagnosis Date     Depressive disorder 2020         PAST SURGICAL HISTORY:  Past Surgical History:   Procedure Laterality Date     NO HISTORY OF SURGERY           SOCIAL HISTORY:  Social History     Socioeconomic History     Marital status: Single     Spouse name: Not on file     Number of children: Not on file     Years of education: Not on file     Highest education level: Not on file   Occupational History     Not on file   Tobacco Use     Smoking status: Former     Packs/day: 0.10     Years: 20.00     Additional pack years: 0.00     Total pack years: 2.00     Types: Cigarettes     Start date: 1998     Quit date: 2022     Years since quittin.4     Smokeless tobacco: Never     Tobacco comments:     used to promote wakefulness     1 ppd for 10 years then cut down   Vaping Use     Vaping Use: Never used   Substance and Sexual Activity     Alcohol use: Yes     Comment: 1-2 drinks per week.     Drug use: Not Currently     Types: Marijuana     Comment: THC in  past for sleep/pain but not recently     Sexual  activity: Not Currently     Birth control/protection: None   Other Topics Concern     Parent/sibling w/ CABG, MI or angioplasty before 65F 55M? No   Social History Narrative     Not on file     Social Determinants of Health     Financial Resource Strain: Low Risk  (9/25/2023)    Financial Resource Strain      Within the past 12 months, have you or your family members you live with been unable to get utilities (heat, electricity) when it was really needed?: No   Food Insecurity: Low Risk  (9/25/2023)    Food Insecurity      Within the past 12 months, did you worry that your food would run out before you got money to buy more?: No      Within the past 12 months, did the food you bought just not last and you didn t have money to get more?: No   Transportation Needs: Low Risk  (9/25/2023)    Transportation Needs      Within the past 12 months, has lack of transportation kept you from medical appointments, getting your medicines, non-medical meetings or appointments, work, or from getting things that you need?: No   Physical Activity: Insufficiently Active (3/8/2022)    Exercise Vital Sign      Days of Exercise per Week: 2 days      Minutes of Exercise per Session: 20 min   Stress: Stress Concern Present (3/8/2022)    [place] [employer] - Occupational Stress Questionnaire      Feeling of Stress : Very much   Social Connections: Socially Isolated (3/8/2022)    Social Connection and Isolation Panel [NHANES]      Frequency of Communication with Friends and Family: Once a week      Frequency of Social Gatherings with Friends and Family: Once a week      Attends Orthodoxy Services: Never      Active Member of Clubs or Organizations: Yes      Attends Club or Organization Meetings: Not on file      Marital Status: Never    Interpersonal Safety: Low Risk  (10/2/2023)    Interpersonal Safety      Do you feel physically and emotionally safe where you currently live?: Yes      Within the past 12 months,  have you been hit, slapped, kicked or otherwise physically hurt by someone?: No      Within the past 12 months, have you been humiliated or emotionally abused in other ways by your partner or ex-partner?: No   Housing Stability: Low Risk  (2023)    Housing Stability      Do you have housing? : Yes      Are you worried about losing your housing?: No         FAMILY HISTORY:  Family History   Problem Relation Age of Onset     Hypertension Mother      Breast Cancer Mother 65     Hyperlipidemia Father      Depression Sister      Anxiety Disorder Sister      Thyroid Disease Sister      Narcolepsy Sister      Thyroid Disease Maternal Grandmother      Depression Sister      Attention Deficit Disorder Sister      Colon Cancer Paternal Uncle         dx after age 50         PHYSICAL EXAM:  /85   Pulse 102   Temp 97.6  F (36.4  C) (Oral)   Resp 16   Wt 52.9 kg (116 lb 9.6 oz)   LMP 09/15/2023 (Approximate)   SpO2 100%   BMI 20.65 kg/m    PHYSICAL EXAMINATION:   ECO-1  GENERAL/CONSTITUTIONAL: No acute distress.  EYES: Pupils are equal, round, and react to light and accommodation. Extraocular movements intact.  No scleral icterus.  ENT/MOUTH: Neck supple. Oropharynx clear, no mucositis.  MUSCULOSKELETAL: Warm and well-perfused, no cyanosis, clubbing, or edema.  NEUROLOGIC: Cranial nerves II-XII are intact. Alert, oriented, answers questions appropriately.  INTEGUMENTARY: No rashes or jaundice.  GAIT: Steady, does not use assistive device          LABS: Reviewed with patient.   Latest Reference Range & Units 23 13:09   Ferritin 6 - 175 ng/mL 38   Iron 37 - 145 ug/dL 171 (H)   Iron Binding Capacity 240 - 430 ug/dL 330   Iron Sat Index 15 - 46 % 52 (H)   WBC 4.0 - 11.0 10e3/uL 4.2   Hemoglobin 11.7 - 15.7 g/dL 13.4   Hematocrit 35.0 - 47.0 % 40.2   Platelet Count 150 - 450 10e3/uL 238   RBC Count 3.80 - 5.20 10e6/uL 4.61   MCV 78 - 100 fL 87   MCH 26.5 - 33.0 pg 29.1   MCHC 31.5 - 36.5 g/dL 33.3   RDW  10.0 - 15.0 % 11.8   % Neutrophils % 52   % Lymphocytes % 35   % Monocytes % 9   % Eosinophils % 3   % Basophils % 1   Absolute Basophils 0.0 - 0.2 10e3/uL 0.1   Absolute Eosinophils 0.0 - 0.7 10e3/uL 0.1   Absolute Immature Granulocytes <=0.4 10e3/uL 0.0   Absolute Lymphocytes 0.8 - 5.3 10e3/uL 1.5   Absolute Monocytes 0.0 - 1.3 10e3/uL 0.4   % Immature Granulocytes % 0   Absolute Neutrophils 1.6 - 8.3 10e3/uL 2.2       11/2022:   iron 138, transferrin 274, tibc 342, % sat 40%  calculated transferrin saturation 40%     Latest Reference Range & Units 10/05/22 08:23   Ferritin 12 - 150 ng/mL 29   Iron 35 - 180 ug/dL 186 (H)   Iron Binding Cap 240 - 430 ug/dL 370   Iron Saturation Index 15 - 46 % 50 (H)         PATHOLOGY:  RESULTS    HEMOCHROMATOSIS RESULTS     HFE Gene C282Y (G845A) RESULTS:     C282Y Mutation Interpretation: NORMAL     HFE Gene H63D (C187G) RESULTS:     H63D Mutation Interpretation: NORMAL     HFE Gene S65C (A193T) RESULTS:     S65C Mutation Interpretation: NORMAL   INTERPRETATION    This patient does not carry the C282Y, the H63D or the S65C mutations in the HFE gene. The absence of these mutations, particularly in non-Caucasians, does not rule out the presence of hemochromatosis.         ASSESSMENT/PLAN:  Jessica Mello is a 41 year old female who is referred for elevated iron. Past medical history is significant for depression and chronic fatigue.     1) Elevated iron, iron saturation  -Patient with consistently low normal ferritin.  -Celiac testing in the past has returned negative.  -Calculated TSAT is 50%.  -She has no known history of liver disease or family history of liver disease, hematologic disorder, phlebotomy.  -She is likely developing iron deficiency. She denies excessive menstrual bleed and no gross evidence of GI/ bleed.   -Hemochromatosis testing returned negative (reviewed negative C282Y, H63D, S65C).   -11/2023: Updated calculated TSAT of 52%. We had discussed the  possibility of an underlying disorder if iron metabolism, including the possibility of masked elevated ferritin levels due to self-phlebotomy with menstruation. Ferritin levels remain low normal at this time, but iron saturation is persistently >50%. I had discussed we could trial phlebotomy for goal <45%, however, my concern is this would worsen her fatigue and could possibly induce headache as well. Will refer to genetics clinic for expanded HHC testing. At this time, she will follow with her PCP and I had discussed she should have once yearly iron studies in addition to CBC, CMP for monitoring. She is encouraged to contact hematology clinic at any time.     2) Leukopenia, resolved  -Normal diff.  -This is likely due to stimulant therapy.  -Monitor for drug induced neutropenia.    3) Chronic fatigue  -Rheum workup negative in the past.  -Follow with PCP/sleep medicine.    4) -Metabolism genetics clinic referral for expanded HHC testing.  -Follow with PCP.          Nory Costello DO  Hematology/Oncology  [facility]        Again, thank you for allowing me to participate in the care of your patient.        Sincerely,        Nory Costello DO

## 2023-11-21 NOTE — PROGRESS NOTES
[facility]    Hematology/Oncology Established Patient Note      Today's Date: 11/21/23    Reason for Consultation: Elevated iron, fatigue  Referring Provider: Liberty Alvarez MD      HISTORY OF PRESENT ILLNESS: Jessica Mello is a 43 year old female who is referred for elevated iron. Past medical history is significant for depression and chronic fatigue.     She has chronic fatigue. She has to sleep every few hours. She was previously on disability, but now is working part-time as a . She has difficulty doing ADLs due to fatigue. She has had rheumatologic evaluation previously for an elevated KEIRY. She denies history of anemia. She has never been on iron supplementation or OTC vitamins.     Patient has limited labs within our system. Record review shows normal CBC. She was seen by a sleep specialist who ordered iron studies. These have been tested in July and October 2022 showing low normal ferritin stores and elevated iron.     Last year, she reports weight loss of 5-10 lbs, of which she has gained back. No fever, drenching night sweats, LAD. No gross evidence of bleed.    Menses started at the age of 17 yo. Regular, not excessively heavy.     Medication list review: she has been on adderall and sonata for a month. Prozac one year. Atarax just a few times a week.    Family history: Mother diagnosed with breast cancer, age 65. No family history of CRC. No family history of liver disease/phlebotomy.     Cancer screening: pap smear overdue. Not of age yet for mammograms or screening colonoscopy.      INTERIM HISTORY:  No unintentional weight loss, drenching night sweats, LAD, fever.     LMP 11/7/23.       REVIEW OF SYSTEMS:   A 14 point ROS was reviewed with pertinent positives and negatives in the HPI.        HOME MEDICATIONS:  Current Outpatient Medications   Medication Sig Dispense Refill    cholecalciferol (VITAMIN D3) 25 mcg (1000 units) capsule Take 2,000 Int'l  Units by mouth      fluticasone (FLONASE) 50 MCG/ACT nasal spray use 1 spray in each nostril daily 16 g 0    hydrOXYzine (ATARAX) 25 MG tablet Take 1-2 tablets (25-50 mg) by mouth 3 times daily as needed for other (sleep/anxiety) 60 tablet 1    Multiple Vitamin (MULTIVITAMIN ADULT PO) Take 1 tablet by mouth daily With iron. Takes intermittently      sertraline (ZOLOFT) 50 MG tablet Take 0.5 tablets (25 mg) by mouth daily for 14 days, THEN 1 tablet (50 mg) daily. 30 tablet 0         ALLERGIES:  Allergies   Allergen Reactions    Bupropion Unknown    Modafinil Unknown         PAST MEDICAL HISTORY:  Past Medical History:   Diagnosis Date    Depressive disorder 2020         PAST SURGICAL HISTORY:  Past Surgical History:   Procedure Laterality Date    NO HISTORY OF SURGERY           SOCIAL HISTORY:  Social History     Socioeconomic History    Marital status: Single     Spouse name: Not on file    Number of children: Not on file    Years of education: Not on file    Highest education level: Not on file   Occupational History    Not on file   Tobacco Use    Smoking status: Former     Packs/day: 0.10     Years: 20.00     Additional pack years: 0.00     Total pack years: 2.00     Types: Cigarettes     Start date: 1998     Quit date: 2022     Years since quittin.4    Smokeless tobacco: Never    Tobacco comments:     used to promote wakefulness     1 ppd for 10 years then cut down   Vaping Use    Vaping Use: Never used   Substance and Sexual Activity    Alcohol use: Yes     Comment: 1-2 drinks per week.    Drug use: Not Currently     Types: Marijuana     Comment: THC in  past for sleep/pain but not recently    Sexual activity: Not Currently     Birth control/protection: None   Other Topics Concern    Parent/sibling w/ CABG, MI or angioplasty before 65F 55M? No   Social History Narrative    Not on file     Social Determinants of Health     Financial Resource Strain: Low Risk  (2023)    Financial Resource  Strain     Within the past 12 months, have you or your family members you live with been unable to get utilities (heat, electricity) when it was really needed?: No   Food Insecurity: Low Risk  (9/25/2023)    Food Insecurity     Within the past 12 months, did you worry that your food would run out before you got money to buy more?: No     Within the past 12 months, did the food you bought just not last and you didn t have money to get more?: No   Transportation Needs: Low Risk  (9/25/2023)    Transportation Needs     Within the past 12 months, has lack of transportation kept you from medical appointments, getting your medicines, non-medical meetings or appointments, work, or from getting things that you need?: No   Physical Activity: Insufficiently Active (3/8/2022)    Exercise Vital Sign     Days of Exercise per Week: 2 days     Minutes of Exercise per Session: 20 min   Stress: Stress Concern Present (3/8/2022)    [place] [employer]ational [employer] - Occupational Stress Questionnaire     Feeling of Stress : Very much   Social Connections: Socially Isolated (3/8/2022)    Social Connection and Isolation Panel [NHANES]     Frequency of Communication with Friends and Family: Once a week     Frequency of Social Gatherings with Friends and Family: Once a week     Attends Pentecostal Services: Never     Active Member of Clubs or Organizations: Yes     Attends Club or Organization Meetings: Not on file     Marital Status: Never    Interpersonal Safety: Low Risk  (10/2/2023)    Interpersonal Safety     Do you feel physically and emotionally safe where you currently live?: Yes     Within the past 12 months, have you been hit, slapped, kicked or otherwise physically hurt by someone?: No     Within the past 12 months, have you been humiliated or emotionally abused in other ways by your partner or ex-partner?: No   Housing Stability: Low Risk  (9/25/2023)    Housing Stability     Do you have housing? : Yes     Are you  worried about losing your housing?: No         FAMILY HISTORY:  Family History   Problem Relation Age of Onset    Hypertension Mother     Breast Cancer Mother 65    Hyperlipidemia Father     Depression Sister     Anxiety Disorder Sister     Thyroid Disease Sister     Narcolepsy Sister     Thyroid Disease Maternal Grandmother     Depression Sister     Attention Deficit Disorder Sister     Colon Cancer Paternal Uncle         dx after age 50         PHYSICAL EXAM:  /85   Pulse 102   Temp 97.6  F (36.4  C) (Oral)   Resp 16   Wt 52.9 kg (116 lb 9.6 oz)   LMP 09/15/2023 (Approximate)   SpO2 100%   BMI 20.65 kg/m    PHYSICAL EXAMINATION:   ECO-1  GENERAL/CONSTITUTIONAL: No acute distress.  EYES: Pupils are equal, round, and react to light and accommodation. Extraocular movements intact.  No scleral icterus.  ENT/MOUTH: Neck supple. Oropharynx clear, no mucositis.  MUSCULOSKELETAL: Warm and well-perfused, no cyanosis, clubbing, or edema.  NEUROLOGIC: Cranial nerves II-XII are intact. Alert, oriented, answers questions appropriately.  INTEGUMENTARY: No rashes or jaundice.  GAIT: Steady, does not use assistive device          LABS: Reviewed with patient.   Latest Reference Range & Units 23 13:09   Ferritin 6 - 175 ng/mL 38   Iron 37 - 145 ug/dL 171 (H)   Iron Binding Capacity 240 - 430 ug/dL 330   Iron Sat Index 15 - 46 % 52 (H)   WBC 4.0 - 11.0 10e3/uL 4.2   Hemoglobin 11.7 - 15.7 g/dL 13.4   Hematocrit 35.0 - 47.0 % 40.2   Platelet Count 150 - 450 10e3/uL 238   RBC Count 3.80 - 5.20 10e6/uL 4.61   MCV 78 - 100 fL 87   MCH 26.5 - 33.0 pg 29.1   MCHC 31.5 - 36.5 g/dL 33.3   RDW 10.0 - 15.0 % 11.8   % Neutrophils % 52   % Lymphocytes % 35   % Monocytes % 9   % Eosinophils % 3   % Basophils % 1   Absolute Basophils 0.0 - 0.2 10e3/uL 0.1   Absolute Eosinophils 0.0 - 0.7 10e3/uL 0.1   Absolute Immature Granulocytes <=0.4 10e3/uL 0.0   Absolute Lymphocytes 0.8 - 5.3 10e3/uL 1.5   Absolute Monocytes 0.0 - 1.3  10e3/uL 0.4   % Immature Granulocytes % 0   Absolute Neutrophils 1.6 - 8.3 10e3/uL 2.2       11/2022:   iron 138, transferrin 274, tibc 342, % sat 40%  calculated transferrin saturation 40%     Latest Reference Range & Units 10/05/22 08:23   Ferritin 12 - 150 ng/mL 29   Iron 35 - 180 ug/dL 186 (H)   Iron Binding Cap 240 - 430 ug/dL 370   Iron Saturation Index 15 - 46 % 50 (H)         PATHOLOGY:  RESULTS    HEMOCHROMATOSIS RESULTS     HFE Gene C282Y (G845A) RESULTS:     C282Y Mutation Interpretation: NORMAL     HFE Gene H63D (C187G) RESULTS:     H63D Mutation Interpretation: NORMAL     HFE Gene S65C (A193T) RESULTS:     S65C Mutation Interpretation: NORMAL   INTERPRETATION    This patient does not carry the C282Y, the H63D or the S65C mutations in the HFE gene. The absence of these mutations, particularly in non-Caucasians, does not rule out the presence of hemochromatosis.         ASSESSMENT/PLAN:  Jessica Mello is a 41 year old female who is referred for elevated iron. Past medical history is significant for depression and chronic fatigue.     1) Elevated iron, iron saturation  -Patient with consistently low normal ferritin.  -Celiac testing in the past has returned negative.  -Calculated TSAT is 50%.  -She has no known history of liver disease or family history of liver disease, hematologic disorder, phlebotomy.  -She is likely developing iron deficiency. She denies excessive menstrual bleed and no gross evidence of GI/ bleed.   -Hemochromatosis testing returned negative (reviewed negative C282Y, H63D, S65C).   -11/2023: Updated calculated TSAT of 52%. We had discussed the possibility of an underlying disorder if iron metabolism, including the possibility of masked elevated ferritin levels due to self-phlebotomy with menstruation. Ferritin levels remain low normal at this time, but iron saturation is persistently >50%. I had discussed we could trial phlebotomy for goal <45%, however, my concern is this  would worsen her fatigue and could possibly induce headache as well. Will refer to genetics clinic for expanded HHC testing. At this time, she will follow with her PCP and I had discussed she should have once yearly iron studies in addition to CBC, CMP for monitoring. She is encouraged to contact hematology clinic at any time.     2) Leukopenia, resolved  -Normal diff.  -This is likely due to stimulant therapy.  -Monitor for drug induced neutropenia.    3) Chronic fatigue  -Rheum workup negative in the past.  -Follow with PCP/sleep medicine.    4) -Metabolism genetics clinic referral for expanded HHC testing.  -Follow with PCP.          Nory Costello DO  Hematology/Oncology  [facility]

## 2023-11-21 NOTE — NURSING NOTE
"Oncology Rooming Note    November 21, 2023 11:34 AM   Jessica Mello is a 43 year old female who presents for:    Chief Complaint   Patient presents with    Oncology Clinic Visit     Initial Vitals: /85   Pulse 102   Temp 97.6  F (36.4  C) (Oral)   Resp 16   Wt 52.9 kg (116 lb 9.6 oz)   LMP 09/15/2023 (Approximate)   SpO2 100%   BMI 20.65 kg/m   Estimated body mass index is 20.65 kg/m  as calculated from the following:    Height as of 10/2/23: 1.6 m (5' 3\").    Weight as of this encounter: 52.9 kg (116 lb 9.6 oz). Body surface area is 1.53 meters squared.  Mild Pain (3) Comment: Data Unavailable   Patient's last menstrual period was 09/15/2023 (approximate).  Allergies reviewed: Yes  Medications reviewed: Yes    Medications: Medication refills not needed today.  Pharmacy name entered into [facility]:    [id number] - [facility] [address] [place] [address] AT [facility] [address] [address] [place] [address] [address]  [facility] - [place], [place] - [facility] [address]    Clinical concerns: follow up        Patrica Stiles            "

## 2023-11-21 NOTE — LETTER
11/21/2023         RE: Jessica Mello  [address]  [place] [address]      [facility]    Hematology/Oncology Established Patient Note      Today's Date: 11/21/23    Reason for Consultation: Elevated iron, fatigue  Referring Provider: Liberty Alvarez MD      HISTORY OF PRESENT ILLNESS: Jessica Mello is a 43 year old female who is referred for elevated iron. Past medical history is significant for depression and chronic fatigue.     She has chronic fatigue. She has to sleep every few hours. She was previously on disability, but now is working part-time as a . She has difficulty doing ADLs due to fatigue. She has had rheumatologic evaluation previously for an elevated KEIRY. She denies history of anemia. She has never been on iron supplementation or OTC vitamins.     Patient has limited labs within our system. Record review shows normal CBC. She was seen by a sleep specialist who ordered iron studies. These have been tested in July and October 2022 showing low normal ferritin stores and elevated iron.     Last year, she reports weight loss of 5-10 lbs, of which she has gained back. No fever, drenching night sweats, LAD. No gross evidence of bleed.    Menses started at the age of 17 yo. Regular, not excessively heavy.     Medication list review: she has been on adderall and sonata for a month. Prozac one year. Atarax just a few times a week.    Family history: Mother diagnosed with breast cancer, age 65. No family history of CRC. No family history of liver disease/phlebotomy.     Cancer screening: pap smear overdue. Not of age yet for mammograms or screening colonoscopy.      INTERIM HISTORY:  No unintentional weight loss, drenching night sweats, LAD, fever.     LMP 11/7/23.       REVIEW OF SYSTEMS:   A 14 point ROS was reviewed with pertinent positives and negatives in the HPI.        HOME MEDICATIONS:  Current Outpatient Medications   Medication Sig  Dispense Refill     cholecalciferol (VITAMIN D3) 25 mcg (1000 units) capsule Take 2,000 Int'l Units by mouth       fluticasone (FLONASE) 50 MCG/ACT nasal spray use 1 spray in each nostril daily 16 g 0     hydrOXYzine (ATARAX) 25 MG tablet Take 1-2 tablets (25-50 mg) by mouth 3 times daily as needed for other (sleep/anxiety) 60 tablet 1     Multiple Vitamin (MULTIVITAMIN ADULT PO) Take 1 tablet by mouth daily With iron. Takes intermittently       sertraline (ZOLOFT) 50 MG tablet Take 0.5 tablets (25 mg) by mouth daily for 14 days, THEN 1 tablet (50 mg) daily. 30 tablet 0         ALLERGIES:  Allergies   Allergen Reactions     Bupropion Unknown     Modafinil Unknown         PAST MEDICAL HISTORY:  Past Medical History:   Diagnosis Date     Depressive disorder 2020         PAST SURGICAL HISTORY:  Past Surgical History:   Procedure Laterality Date     NO HISTORY OF SURGERY           SOCIAL HISTORY:  Social History     Socioeconomic History     Marital status: Single     Spouse name: Not on file     Number of children: Not on file     Years of education: Not on file     Highest education level: Not on file   Occupational History     Not on file   Tobacco Use     Smoking status: Former     Packs/day: 0.10     Years: 20.00     Additional pack years: 0.00     Total pack years: 2.00     Types: Cigarettes     Start date: 1998     Quit date: 2022     Years since quittin.4     Smokeless tobacco: Never     Tobacco comments:     used to promote wakefulness     1 ppd for 10 years then cut down   Vaping Use     Vaping Use: Never used   Substance and Sexual Activity     Alcohol use: Yes     Comment: 1-2 drinks per week.     Drug use: Not Currently     Types: Marijuana     Comment: THC in  past for sleep/pain but not recently     Sexual activity: Not Currently     Birth control/protection: None   Other Topics Concern     Parent/sibling w/ CABG, MI or angioplasty before 65F 55M? No   Social History Narrative     Not on  file     Social Determinants of Health     Financial Resource Strain: Low Risk  (9/25/2023)    Financial Resource Strain      Within the past 12 months, have you or your family members you live with been unable to get utilities (heat, electricity) when it was really needed?: No   Food Insecurity: Low Risk  (9/25/2023)    Food Insecurity      Within the past 12 months, did you worry that your food would run out before you got money to buy more?: No      Within the past 12 months, did the food you bought just not last and you didn t have money to get more?: No   Transportation Needs: Low Risk  (9/25/2023)    Transportation Needs      Within the past 12 months, has lack of transportation kept you from medical appointments, getting your medicines, non-medical meetings or appointments, work, or from getting things that you need?: No   Physical Activity: Insufficiently Active (3/8/2022)    Exercise Vital Sign      Days of Exercise per Week: 2 days      Minutes of Exercise per Session: 20 min   Stress: Stress Concern Present (3/8/2022)    [place] [employer] - Occupational Stress Questionnaire      Feeling of Stress : Very much   Social Connections: Socially Isolated (3/8/2022)    Social Connection and Isolation Panel [NHANES]      Frequency of Communication with Friends and Family: Once a week      Frequency of Social Gatherings with Friends and Family: Once a week      Attends Rastafari Services: Never      Active Member of Clubs or Organizations: Yes      Attends Club or Organization Meetings: Not on file      Marital Status: Never    Interpersonal Safety: Low Risk  (10/2/2023)    Interpersonal Safety      Do you feel physically and emotionally safe where you currently live?: Yes      Within the past 12 months, have you been hit, slapped, kicked or otherwise physically hurt by someone?: No      Within the past 12 months, have you been humiliated or emotionally abused in other ways by your  partner or ex-partner?: No   Housing Stability: Low Risk  (2023)    Housing Stability      Do you have housing? : Yes      Are you worried about losing your housing?: No         FAMILY HISTORY:  Family History   Problem Relation Age of Onset     Hypertension Mother      Breast Cancer Mother 65     Hyperlipidemia Father      Depression Sister      Anxiety Disorder Sister      Thyroid Disease Sister      Narcolepsy Sister      Thyroid Disease Maternal Grandmother      Depression Sister      Attention Deficit Disorder Sister      Colon Cancer Paternal Uncle         dx after age 50         PHYSICAL EXAM:  /85   Pulse 102   Temp 97.6  F (36.4  C) (Oral)   Resp 16   Wt 52.9 kg (116 lb 9.6 oz)   LMP 09/15/2023 (Approximate)   SpO2 100%   BMI 20.65 kg/m    PHYSICAL EXAMINATION:   ECO-1  GENERAL/CONSTITUTIONAL: No acute distress.  EYES: Pupils are equal, round, and react to light and accommodation. Extraocular movements intact.  No scleral icterus.  ENT/MOUTH: Neck supple. Oropharynx clear, no mucositis.  MUSCULOSKELETAL: Warm and well-perfused, no cyanosis, clubbing, or edema.  NEUROLOGIC: Cranial nerves II-XII are intact. Alert, oriented, answers questions appropriately.  INTEGUMENTARY: No rashes or jaundice.  GAIT: Steady, does not use assistive device          LABS: Reviewed with patient.   Latest Reference Range & Units 23 13:09   Ferritin 6 - 175 ng/mL 38   Iron 37 - 145 ug/dL 171 (H)   Iron Binding Capacity 240 - 430 ug/dL 330   Iron Sat Index 15 - 46 % 52 (H)   WBC 4.0 - 11.0 10e3/uL 4.2   Hemoglobin 11.7 - 15.7 g/dL 13.4   Hematocrit 35.0 - 47.0 % 40.2   Platelet Count 150 - 450 10e3/uL 238   RBC Count 3.80 - 5.20 10e6/uL 4.61   MCV 78 - 100 fL 87   MCH 26.5 - 33.0 pg 29.1   MCHC 31.5 - 36.5 g/dL 33.3   RDW 10.0 - 15.0 % 11.8   % Neutrophils % 52   % Lymphocytes % 35   % Monocytes % 9   % Eosinophils % 3   % Basophils % 1   Absolute Basophils 0.0 - 0.2 10e3/uL 0.1   Absolute Eosinophils  0.0 - 0.7 10e3/uL 0.1   Absolute Immature Granulocytes <=0.4 10e3/uL 0.0   Absolute Lymphocytes 0.8 - 5.3 10e3/uL 1.5   Absolute Monocytes 0.0 - 1.3 10e3/uL 0.4   % Immature Granulocytes % 0   Absolute Neutrophils 1.6 - 8.3 10e3/uL 2.2       11/2022:   iron 138, transferrin 274, tibc 342, % sat 40%  calculated transferrin saturation 40%     Latest Reference Range & Units 10/05/22 08:23   Ferritin 12 - 150 ng/mL 29   Iron 35 - 180 ug/dL 186 (H)   Iron Binding Cap 240 - 430 ug/dL 370   Iron Saturation Index 15 - 46 % 50 (H)         PATHOLOGY:  RESULTS    HEMOCHROMATOSIS RESULTS     HFE Gene C282Y (G845A) RESULTS:     C282Y Mutation Interpretation: NORMAL     HFE Gene H63D (C187G) RESULTS:     H63D Mutation Interpretation: NORMAL     HFE Gene S65C (A193T) RESULTS:     S65C Mutation Interpretation: NORMAL   INTERPRETATION    This patient does not carry the C282Y, the H63D or the S65C mutations in the HFE gene. The absence of these mutations, particularly in non-Caucasians, does not rule out the presence of hemochromatosis.         ASSESSMENT/PLAN:  Jessica Mello is a 41 year old female who is referred for elevated iron. Past medical history is significant for depression and chronic fatigue.     1) Elevated iron, iron saturation  -Patient with consistently low normal ferritin.  -Celiac testing in the past has returned negative.  -Calculated TSAT is 50%.  -She has no known history of liver disease or family history of liver disease, hematologic disorder, phlebotomy.  -She is likely developing iron deficiency. She denies excessive menstrual bleed and no gross evidence of GI/ bleed.   -Hemochromatosis testing returned negative (reviewed negative C282Y, H63D, S65C).   -11/2023: Updated calculated TSAT of 52%. We had discussed the possibility of an underlying disorder if iron metabolism, including the possibility of masked elevated ferritin levels due to self-phlebotomy with menstruation. Ferritin levels remain low  normal at this time, but iron saturation is persistently >50%. I had discussed we could trial phlebotomy for goal <45%, however, my concern is this would worsen her fatigue and could possibly induce headache as well. Will refer to genetics clinic for expanded HHC testing. At this time, she will follow with her PCP and I had discussed she should have once yearly iron studies in addition to CBC, CMP for monitoring. She is encouraged to contact hematology clinic at any time.     2) Leukopenia, resolved  -Normal diff.  -This is likely due to stimulant therapy.  -Monitor for drug induced neutropenia.    3) Chronic fatigue  -Rheum workup negative in the past.  -Follow with PCP/sleep medicine.    4) -Metabolism genetics clinic referral for expanded HHC testing.  -Follow with PCP.          Nory Costello DO  Hematology/Oncology  [facility]          Nory Costello DO

## 2023-11-22 ENCOUNTER — PATIENT OUTREACH (OUTPATIENT)
Dept: CARE COORDINATION | Facility: CLINIC | Age: 43
End: 2023-11-22
Payer: COMMERCIAL

## 2023-11-22 ENCOUNTER — TELEPHONE (OUTPATIENT)
Dept: SPIRITUAL SERVICES | Facility: CLINIC | Age: 43
End: 2023-11-22
Payer: COMMERCIAL

## 2023-11-22 NOTE — TELEPHONE ENCOUNTER
"SPIRITUAL HEALTH SERVICES Phone Encounter Note  [facility] [place]    Reason for Contact: Pt Jessica Mello has a history of chronic fatigue and elevated iron and is followed by Dr. Costello at [facility] in [place].  This author was referred to contact Jessica because she recently scored positive on a distress screen for potential spiritual distress.    I called Jessica and introduced SHS.  She expressed no needs at this time and reported that she has reached out to  at her primary clinic.  When asked about her support network, Jessica [place]    Plan: Provided Jessica with contact information.  I remain available per her request, as needs arise.    Clyde Bell M.Div., [facility]  Staff   Phone [phone]   "

## 2023-11-22 NOTE — PROGRESS NOTES
Clinic Care Coordination Contact  Follow Up Progress Note      Assessment: JESUS MELENDEZ outreached to patient for follow up. Patient has not heard back from her employer and reports she did send an email. Patient reports she has not reached out to any resources JESUS MELENDEZ sent her last week. JESUS MELENDEZ asked if there was anything she wanted to discuss today and patient had no further concerns or questions. JESUS MELENDEZ asked if she could call back next week and patient agreed and reported she should have an answer from her employer regarding her disability and insurance by that time.     Care Gaps:    Health Maintenance Due   Topic Date Due    DEPRESSION ACTION PLAN  Never done    HEPATITIS B IMMUNIZATION (1 of 3 - 3-dose series) Never done    PAP  Never done    INFLUENZA VACCINE (1) 09/01/2023    COVID-19 Vaccine (3 - 2023-24 season) 09/01/2023     Care Plans  Care Plan: Mental Health       Problem: Mental Health Symptoms Need Improvement       Goal: Reach out to Mental Health Lines as Needed       Start Date: 11/17/2023 Expected End Date: 11/17/2024    Note:     Barriers: Cost of care   Strengths: willing to receive resources  Patient expressed understanding of goal: yes    Action steps to achieve this goal:  1. I will contact resources provided when needed.  2. I will utilize crisis resources if needed  3. I will work with care coordination for ongoing support and resources.                             Care Plan: Financial Wellbeing       Problem: Patient expresses financial resource strain       Goal: Create an action plan to increase financial stability       Start Date: 11/17/2023 Expected End Date: 5/17/2024    Note:     Barriers: DANNA at work, insurance concerns  Strengths: Working with HR  Patient expressed understanding of goal: Yes  Action steps to achieve this goal:  1. I will discuss disability and insurance options with my employer.  2. I will reach out to the disability hub to discuss social security.   3. I will work with  care coordination for ongoing resources.                                Intervention/Education provided during outreach: N/A     Outreach Frequency: weekly, more frequently as needed    Plan: Patient to reach out to employer to ask about her LTD and insurance. SW CC to outreach in one week.    Nahomy Thomas [facility]  Social Work Primary Care Clinic Care Coordinator  [facility]  [phone]  [email or web address]

## 2023-11-29 ENCOUNTER — MYC MEDICAL ADVICE (OUTPATIENT)
Dept: FAMILY MEDICINE | Facility: CLINIC | Age: 43
End: 2023-11-29
Payer: COMMERCIAL

## 2023-11-29 ENCOUNTER — PATIENT OUTREACH (OUTPATIENT)
Dept: CARE COORDINATION | Facility: CLINIC | Age: 43
End: 2023-11-29
Payer: COMMERCIAL

## 2023-11-29 NOTE — PROGRESS NOTES
Clinic Care Coordination Contact  Follow Up Progress Note      Assessment: JESUS MELENDEZ outreached to patient for scheduled follow up. Patient reports her employer notified her she will have insurance benefits through the end of December. Patient is still on STD. Patient looked into SSA resources and plans to apply for SSDI. Patient denies any food insecurity at this time. JESUS MELENDEZ put in FRW referral to identify if patient is eligible for any [facility] support.     Care Gaps:    Health Maintenance Due   Topic Date Due    DEPRESSION ACTION PLAN  Never done    HEPATITIS B IMMUNIZATION (1 of 3 - 3-dose series) Never done    PAP  Never done    INFLUENZA VACCINE (1) 09/01/2023    COVID-19 Vaccine (3 - 2023-24 season) 09/01/2023    CMP  10/19/2023     Care Plans  Care Plan: Mental Health       Problem: Mental Health Symptoms Need Improvement       Goal: Reach out to Mental Health Lines as Needed       Start Date: 11/17/2023 Expected End Date: 11/17/2024    Note:     Barriers: Cost of care   Strengths: willing to receive resources  Patient expressed understanding of goal: yes    Action steps to achieve this goal:  1. I will contact resources provided when needed.  2. I will utilize crisis resources if needed  3. I will work with care coordination for ongoing support and resources.                             Care Plan: Financial Wellbeing       Problem: Patient expresses financial resource strain       Goal: Create an action plan to increase financial stability       Start Date: 11/17/2023 Expected End Date: 5/17/2024    Note:     Barriers: DANNA at work, insurance concerns  Strengths: Working with HR  Patient expressed understanding of goal: Yes  Action steps to achieve this goal:  1. I will discuss disability and insurance options with my employer. (Completed). Insurance runs through Dec 31st.   2. I will reach out to the disability hub to discuss social security.   3. I will work with care coordination for ongoing resources.                                 Intervention/Education provided during outreach: Education on FRW program.      Outreach Frequency: weekly, more frequently as needed    Plan: JESUS MELENDEZ placed FRW referral. Patient to continue to work with employer for questions regarding her short term disability.   Care Coordinator will follow up in 2 weeks.      and Financial Resource Worker Screening    [facility] Benefits  Is patient requesting help applying for [facility] benefits?: Yes  Have you recently applied for any county benefits?: No  How many people in your household?: 1  What is the monthly gross income for the household (wages, social security, workers comp, and pension)? : 2800    Ins[place]:  [place]?: No  Is this an insurance renewal?: No  Is this a new insurance application request?: Yes  Have you recently applied for insurance?: No  How many people in your household? : 1  Do you file taxes?: Yes  How many dependents do you claim?: 0  What is the monthly gross income for the household (wages, social security, workers comp, and pension)? : 2800    Any other information for the FRW?: Monthly income is from STD, patient will lose her insurance coverage Dec 31st through her employer, wanting to look into insurance and other financial resources    Nahomy Thomas, [facility]  Social Work Primary Care Clinic Care Coordinator  [facility]  [phone]  [email or web address]

## 2023-11-29 NOTE — LETTER
November 29, 2023      Jessica Mello  [address]  [place] [address]        To Whom It May Concern,       Jessica YUKI Mello will be unable to return to work on December 11, 2023 due to ongoing medical concerns.           Sincerely,        Liberty Alvarez MD

## 2023-11-29 NOTE — TELEPHONE ENCOUNTER
Letter drafted and sent to TC team. Please fax per patient mychart message. Please update patient when completed.

## 2023-12-01 ENCOUNTER — PATIENT OUTREACH (OUTPATIENT)
Dept: CARE COORDINATION | Facility: CLINIC | Age: 43
End: 2023-12-01
Payer: COMMERCIAL

## 2023-12-01 NOTE — PROGRESS NOTES
Clinic Care Coordination Contact  Program: Health Insurance & County Benefits   County:  [facility] Case #:  [facility] Worker:   Jeanette #:   Subscriber #:   Renewal:  Date Applied:     FRW Outreach:   12/1/23: Leticia TOLEDO (FRW) called pt on FRW behalf. Pt is over income for [facility] Benefits. FRW (Romi) and [place] on 12/14.  Leticia Kulkarni   Financial Resource Worker  [facility]  Clinic Care Coordination  [phone]     Health Insurance:  Health Insurance Screening: [employer]   1. Do you currently have health insurance, did you receive a renewal? Yes, terming 12/31  2. If you applied through [employer], do you know your username/password? No  3. How many people in the household? 1  4. Do you file taxes, who do you file with? Yes, independently   5. What is your monthly income (include all tax members)? 2.800  6. Do you have access to insurance through an employer (if yes, need EIN)? Not after 12/31  7. Do you have social security cards and/or green cards? Yes      Referral/Screening:   [facility] Benefits  Is patient requesting help applying for [facility] benefits?: Yes  Have you recently applied for any county benefits?: No  How many people in your household?: 1  What is the monthly gross income for the household (wages, social security, workers comp, and pension)? : 2800     [place]:  [place] No  Is this an insurance renewal?: No  Is this a new insurance application request?: Yes  Have you recently applied for insurance?: No  How many people in your household? : 1  Do you file taxes?: Yes  How many dependents do you claim?: 0  What is the monthly gross income for the household (wages, social security, workers comp, and pension)? : 2800     ss income for the household (wages, social security, workers comp, and pension)? : 2800     Any other information for the FRW?: Monthly income is from STD, patient will lose her insurance coverage Dec  31st through her employer, wanting to look into insurance and other financial resources

## 2023-12-12 ASSESSMENT — SLEEP AND FATIGUE QUESTIONNAIRES
HOW LIKELY ARE YOU TO NOD OFF OR FALL ASLEEP WHILE LYING DOWN TO REST IN THE AFTERNOON WHEN CIRCUMSTANCES PERMIT: HIGH CHANCE OF DOZING
HOW LIKELY ARE YOU TO NOD OFF OR FALL ASLEEP IN A CAR, WHILE STOPPED FOR A FEW MINUTES IN TRAFFIC: WOULD NEVER DOZE
HOW LIKELY ARE YOU TO NOD OFF OR FALL ASLEEP WHEN YOU ARE A PASSENGER IN A CAR FOR AN HOUR WITHOUT A BREAK: MODERATE CHANCE OF DOZING
HOW LIKELY ARE YOU TO NOD OFF OR FALL ASLEEP WHILE SITTING AND READING: HIGH CHANCE OF DOZING
HOW LIKELY ARE YOU TO NOD OFF OR FALL ASLEEP WHILE SITTING AND TALKING TO SOMEONE: SLIGHT CHANCE OF DOZING
HOW LIKELY ARE YOU TO NOD OFF OR FALL ASLEEP WHILE SITTING INACTIVE IN A PUBLIC PLACE: MODERATE CHANCE OF DOZING
HOW LIKELY ARE YOU TO NOD OFF OR FALL ASLEEP WHILE WATCHING TV: HIGH CHANCE OF DOZING
HOW LIKELY ARE YOU TO NOD OFF OR FALL ASLEEP WHILE SITTING QUIETLY AFTER LUNCH WITHOUT ALCOHOL: HIGH CHANCE OF DOZING

## 2023-12-12 NOTE — PROGRESS NOTES
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     Per recent clinic note visit with Dr. Pranay Edward, \"Pertinent work up included 2-week actigraphy reported sufficient sleep with 8 hours of mean TST. PSG showed sleep latency of 2.6 minutes and REM latency of 132 minutes total sleep time of 437 minutes. AHI of 0.1/hour. MSLT with 5 nap opportunities showed 2 SOREMPs, but mean sleep latency was 12.3 minutes (20 min, 7 min, 6 min, 17.5 min, 0.5 min, and 10.5 min), though results are inconsistent when the table was compared to actual report with impression. Urine tox was not obtained. She was on fluoxetine during the testing.\"    She feels her depression started after the sleepiness. She feels it is secondary to being tired all of the time.          Patient is not keen on adderall immediate release (previously on immediate release 15 mg a day, switched to XR formula due to shortage) as it caused excessive jitteriness, tenseness, and discomfort. She felt her brain would stop working and she would get obsessed about little details at work. It made her less productive.   She has previously been on Xyrem for about 4 months and felt that it did improve her symptoms. Dose had been slowly increased to 9g. She was able to get 3-4 hours of sleep per dose and would wake up feeling relatively refreshed.   Per prior notes, she has \"tried anti-histamine, melatonin, bupropion, fluoxetine, modafinil, and methylphenidate (side effects). She had serious emotional effects from bupropion and modafinil.\"        She took FMLA and then resigned due to treatment for her sleepiness not being effective. The [employer] was not filled and she was not very excited about using that anyway (concerned about side effects).   She reports responding best to Xyrem in the past, 4.5 g twice nightly.   She takes hydroxyzine occasionally, more " for anxiety than sleep now. It causes some hangover grogginess.     Bedtime: 10 PM. Wake time 9-10 AM. She falls asleep in 10-15 minutes. Some nights she has insomnia. She just changed antidepressants to see if that would help her sleep. She switched from sertraline to duloxetine. It did help a little. She wakes 2-3 times in the night for uncertain reasons. She may toss and turn for 30 minutes to hours. She naps 1-2 times per day for 1-2 hours. She does note dreams when she naps. She dozes inadvertently a couple of times per day.   She does have difficulty waking in the morning. She has tried a SAD lamp, keeping it on her desk at home. She would use it for an hour in the morning and an hour in the afternoon. She did not notice much difference.     She reports waking feeling she can't move for 30 seconds. That can happen a couple of times per week. She sometimes hears things, a loud bang or knocking. She has seen things in the past but not much now. She is not aware of any cataplexy.     She feels her depression and anxiety are pretty high due to her current work situation.     She does get light-headed with standing up quickly, but denies a racing heart. She sometimes has chest tightness. She has had some shaking and sweating, but only as a side effect to medications. She has a little hypermobility in her thumb joint, almost able to touch her wrist. No hyperextension of elbow. She says her brother has hypermobility.     She denies RLS. Ferritin was 38 on 11/17/23. Iron panel showed high iron. UTox in June was positive only for amphetamines.       Past medical/surgical history, family history, social history, medications and allergies were reviewed.      Problem List:  Patient Active Problem List    Diagnosis Date Noted    Nonallergic rhinitis 07/18/2023     Priority: Medium     Negative skin testing 6/10/23      Abnormal serum iron level 11/15/2022     Priority: Medium     Heme w/u 2022. HH testing neg.  Monitor for  developing NINA. If note this would need gi work-up  (Her heme 2023: We had discussed the possibility of an underlying disorder if iron metabolism, including the possibility of masked elevated ferritin levels due to self-phlebotomy with menstruation. Ferritin levels remain low normal at this time, but iron saturation is persistently >50%. I had discussed we could trial phlebotomy for goal <45%, however, my concern is this would worsen her fatigue and could possibly induce headache as well. Will refer to genetics clinic for expanded HHC testing. At this time, she will follow with her PCP and I had discussed she should have once yearly iron studies in addition to CBC, CMP for monitoring. She is encouraged to contact hematology clinic at any time)         Major depression, recurrent (H24) 03/18/2022     Priority: Medium    Narcolepsy without cataplexy(347.00) 01/04/2022     Priority: Medium    Smoker 06/04/2021     Priority: Medium    Other depression 06/04/2021     Priority: Medium    Anxiety 06/04/2021     Priority: Medium    Fatigue, unspecified type 06/04/2021     Priority: Medium        Impression/Plan:    (G47.419) Narcolepsy without cataplexy(347.00)  (primary encounter diagnosis)  Comment: Ramiro presents to continue management of hypersomnia, possible narcolepsy without cataplexy. She has been struggling to get consistent care and she has not been responding well to many medications. She has responded best to Xyrem and Adderall. She currently has a long sleep opportunity, 10 PM to 9-10 AM and multiple purposeful and inadvertent naps in the day since resigning from her job. She will be losing her insurance, so it may not be good timing to get back on expensive agents right now.   Plan: amphetamine-dextroamphetamine (ADDERALL) 10 MG tablet        Take 1/2 tab (5 mg) 4 times daily. Let me know when back on insurance and we can consider Xywav. Use the SAD lamp in the morning upon awakening. Try to compress time in bed  to limit awakenings at night. She signed a controlled substance agreement today. She had a Utox in June that was negative except for amphetamines (on Adderall).     She will follow up with me in about 6 month(s).     40 minutes were spent on the date of the encounter doing chart review, history and exam, documentation and further activities as noted above.     Bennett Goltz, PA-C    CC: Liberty Alvarez MD

## 2023-12-13 ENCOUNTER — OFFICE VISIT (OUTPATIENT)
Dept: SLEEP MEDICINE | Facility: CLINIC | Age: 43
End: 2023-12-13
Payer: COMMERCIAL

## 2023-12-13 VITALS
HEIGHT: 63 IN | OXYGEN SATURATION: 98 % | BODY MASS INDEX: 20.8 KG/M2 | WEIGHT: 117.4 LBS | HEART RATE: 115 BPM | DIASTOLIC BLOOD PRESSURE: 86 MMHG | SYSTOLIC BLOOD PRESSURE: 133 MMHG

## 2023-12-13 DIAGNOSIS — G47.419 NARCOLEPSY WITHOUT CATAPLEXY(347.00): Primary | ICD-10-CM

## 2023-12-13 PROCEDURE — 99215 OFFICE O/P EST HI 40 MIN: CPT | Performed by: PHYSICIAN ASSISTANT

## 2023-12-13 RX ORDER — DULOXETIN HYDROCHLORIDE 30 MG/1
30 CAPSULE, DELAYED RELEASE ORAL DAILY
COMMUNITY
Start: 2023-12-06 | End: 2024-04-17

## 2023-12-13 RX ORDER — DEXTROAMPHETAMINE SACCHARATE, AMPHETAMINE ASPARTATE, DEXTROAMPHETAMINE SULFATE AND AMPHETAMINE SULFATE 2.5; 2.5; 2.5; 2.5 MG/1; MG/1; MG/1; MG/1
TABLET ORAL
Qty: 60 TABLET | Refills: 0 | Status: SHIPPED | OUTPATIENT
Start: 2023-12-13

## 2023-12-13 NOTE — NURSING NOTE
"Chief Complaint   Patient presents with    Sleep Problem     Medication follow up       Initial /86   Pulse 115   Ht 1.6 m (5' 3\")   Wt 53.3 kg (117 lb 6.4 oz)   SpO2 98%   BMI 20.80 kg/m   Estimated body mass index is 20.8 kg/m  as calculated from the following:    Height as of this encounter: 1.6 m (5' 3\").    Weight as of this encounter: 53.3 kg (117 lb 6.4 oz).    Medication Reconciliation: complete  ESS 17  Teto Aguero MA       "

## 2023-12-14 ENCOUNTER — PATIENT OUTREACH (OUTPATIENT)
Dept: CARE COORDINATION | Facility: CLINIC | Age: 43
End: 2023-12-14
Payer: COMMERCIAL

## 2023-12-14 NOTE — TELEPHONE ENCOUNTER
Clinic Care Coordination Contact  Program: Health Insurance & County Benefits   County:  [facility] Case #:  County Worker:   Jeanette #:   Subscriber #:   Renewal:  Date Applied:     FRW Outreach:  12/14/23- Frw called pt at appt time, she state that her income will continue $717 for next year which make her annual income at [address]. Pt state that she want to buy an plan that cover her prescription. Frw recommend that she reach out to a  Levon Martinez at [facility] before applying so she understands what It covers.  Pt agreed and decide that she will follow up with frw after her appt with the . Frw will follow within 30 days.  Romi Ornelas  Financial Resource Worker  [facility] Care Coordination  [phone]     12/1/23: Leticia TOLEDO (CRISTIN) called pt on FRW behalf. Pt is over income for [facility] Benefits. FRW (Romi) and pt will complete [place] sure application for Health Insurance on 12/14.  Leticia Kulkarni   Financial Resource Worker  [place] [facility]  Clinic Care Coordination  [phone]     Health Insurance:  Health Insurance Screening: MNSURE   1. Do you currently have health insurance, did you receive a renewal? Yes, terming 12/31  2. If you applied through Mnsure, do you know your username/password? No  3. How many people in the household? 1  4. Do you file taxes, who do you file with? Yes, independently   5. What is your monthly income (include all tax members)? 2.800  6. Do you have access to insurance through an employer (if yes, need EIN)? Not after 12/31  7. Do you have social security cards and/or green cards? Yes      Referral/Screening:   County Benefits  Is patient requesting help applying for county benefits?: Yes  Have you recently applied for any county benefits?: No  How many people in your household?: 1  What is the monthly gross income for the household (wages, social security, workers comp, and pension)? : 2800     Insurance:  [place]  insurance?: No  Is this an insurance renewal?: No  Is this a new insurance application request?: Yes  Have you recently applied for insurance?: No  How many people in your household? : 1  Do you file taxes?: Yes  How many dependents do you claim?: 0  What is the monthly gross income for the household (wages, social security, workers comp, and pension)? : 2800     ss income for the household (wages, social security, workers comp, and pension)? : 2800     Any other information for the FRW?: Monthly income is from [employer], patient will lose her insurance coverage Dec 31st through her employer, wanting to look into insurance and other financial resources

## 2023-12-15 ENCOUNTER — PATIENT OUTREACH (OUTPATIENT)
Dept: CARE COORDINATION | Facility: CLINIC | Age: 43
End: 2023-12-15
Payer: COMMERCIAL

## 2023-12-15 NOTE — PROGRESS NOTES
Clinic Care Coordination Contact  Follow Up Progress Note      Assessment: JESUS MELENDEZ outreached to patient to check in. Patient reports she spoke to the FRW yesterday who referred her to a , patient reports she went onto MNSure last night and looked through plans and plans to follow up with FRW next week. Patient also reports her sleep appt went well this week and she is started on Adderall again. Patient reports her provider explained if she were to get new insurance coverage they could try different medications as well. Patient overall content with that plan. Patient plans to look into SSDI, and reports feeling okay with resigning from her position at this time.     Care Gaps:    Health Maintenance Due   Topic Date Due    DEPRESSION ACTION PLAN  Never done    HEPATITIS B IMMUNIZATION (1 of 3 - 3-dose series) Never done    PAP  Never done    INFLUENZA VACCINE (1) 09/01/2023    COVID-19 Vaccine (3 - 2023-24 season) 09/01/2023    CMP  10/19/2023     Care Plans  Care Plan: Mental Health       Problem: Mental Health Symptoms Need Improvement       Goal: Reach out to Mental Health Lines as Needed       Start Date: 11/17/2023 Expected End Date: 11/17/2024    Note:     Barriers: Cost of care   Strengths: willing to receive resources  Patient expressed understanding of goal: yes    Action steps to achieve this goal:  1. I will contact resources provided when needed.  2. I will utilize crisis resources if needed  3. I will work with care coordination for ongoing support and resources.                             Care Plan: Financial Wellbeing       Problem: Patient expresses financial resource strain       Goal: Create an action plan to increase financial stability       Start Date: 11/17/2023 Expected End Date: 5/17/2024    Note:     Barriers: DANNA at work, insurance concerns  Strengths: Working with HR  Patient expressed understanding of goal: Yes  Action steps to achieve this goal:  1. I will discuss disability and  insurance options with my employer. (Completed). Insurance runs through Dec 31st.   2. I will reach out to the disability hub to discuss social security.   3. I will work with FRW to identify other insurance options and county benefits.  3. I will work with care coordination for ongoing resources.                                Intervention/Education provided during outreach: N/A     Outreach Frequency: monthly, more frequently as needed    Plan: Patient will look into insurance options. SW CC to outreach in one month.     Nahomy Thomas [facility]  Social Work Primary Care Clinic Care Coordinator  [facility]  [phone]  [email or web address]

## 2023-12-20 DIAGNOSIS — F41.9 ANXIETY: ICD-10-CM

## 2023-12-20 DIAGNOSIS — R09.81 NASAL CONGESTION: ICD-10-CM

## 2023-12-20 RX ORDER — HYDROXYZINE HYDROCHLORIDE 25 MG/1
TABLET, FILM COATED ORAL
Qty: 60 TABLET | Refills: 1 | Status: SHIPPED | OUTPATIENT
Start: 2023-12-20

## 2023-12-20 RX ORDER — FLUTICASONE PROPIONATE 50 MCG
SPRAY, SUSPENSION (ML) NASAL
Qty: 16 G | Refills: 1 | Status: SHIPPED | OUTPATIENT
Start: 2023-12-20 | End: 2024-09-03

## 2023-12-22 ENCOUNTER — TELEPHONE (OUTPATIENT)
Dept: PULMONOLOGY | Facility: OTHER | Age: 43
End: 2023-12-22

## 2023-12-28 ENCOUNTER — PATIENT OUTREACH (OUTPATIENT)
Dept: CARE COORDINATION | Facility: CLINIC | Age: 43
End: 2023-12-28
Payer: COMMERCIAL

## 2023-12-28 NOTE — TELEPHONE ENCOUNTER
Clinic Care Coordination Contact  Program: Health Insurance & County Benefits   County:  [facility] Case #:  [facility] Worker:   Jeanette #:   Subscriber #:   Renewal:  Date Applied:     FRW Outreach:  12/28/23- Frw called pt to follow up on whether she is ready to apply for Mnsure. Pt said that she looked at the plans on Mnsure and couldn't find an affordable one. Therefore she decided that she doesn't want to move forward. Frw informed pt that in case she changes her mind OE is until 1/15/24. Frw will close the referral and remove pt from panel.  Romi Ornelas  Financial Resource Worker  [place] [facility] Care Coordination  [phone]     12/14/23- Frw called pt at appt time, she state that her income will continue $717 weekly for next year which make her annual income at 15203. Pt state that she want to buy an plan that cover her prescription. Frw recommend that she reach out to a  Levon Martinez at [facility] before applying so she understands what It covers.  Pt agreed and decide that she will follow up with frw after her appt with the . Frw will follow within 30 days.  Romi Ornelas  Financial Resource Worker  [place] [facility] Care Coordination  [phone]     12/1/23: Leticia TOLEDO (CRISTIN) called pt on FRW behalf. Pt is over income for [facility] [employer]. FRW (Romi) and pt will complete MNsure application for Health Insurance on 12/14.  Leticia Kulkarni   Financial Resource Worker  [place] [facility] Care Coordination  [phone]     Health Insurance:  Health Insurance Screening: MNSURE   1. Do you currently have health insurance, did you receive a renewal? Yes, terming 12/31  2. If you applied through Mnsure, do you know your username/password? No  3. How many people in the household? 1  4. Do you file taxes, who do you file with? Yes, independently   5. What is your monthly income (include all tax members)? 2.800  6. Do you have access to insurance through  an employer (if yes, need EIN)? Not after 12/31  7. Do you have social security cards and/or green cards? Yes      Referral/Screening:   [facility] Benefits  Is patient requesting help applying for [facility] benefits?: Yes  Have you recently applied for any county benefits?: No  How many people in your household?: 1  What is the monthly gross income for the household (wages, social security, workers comp, and pension)? : 2800     Insurance:  [place]?: No  Is this an insurance renewal?: No  Is this a new insurance application request?: Yes  Have you recently applied for insurance?: No  How many people in your household? : 1  Do you file taxes?: Yes  How many dependents do you claim?: 0  What is the monthly gross income for the household (wages, social security, workers comp, and pension)? : 2800     ss income for the household (wages, social security, workers comp, and pension)? : 2800     Any other information for the FRW?: Monthly income is from [employer], patient will lose her insurance coverage Dec 31st through her employer, wanting to look into insurance and other financial resources

## 2023-12-29 ENCOUNTER — TELEPHONE (OUTPATIENT)
Dept: FAMILY MEDICINE | Facility: CLINIC | Age: 43
End: 2023-12-29
Payer: COMMERCIAL

## 2023-12-29 NOTE — TELEPHONE ENCOUNTER
General Call      Reason for Call:  Prior Authorization    What are your questions or concerns:  Prior authorization needs to be submitted through [facility], [phone] for yrem 500mg/per ml  Lucy states the prescription is from Dr. Boothe, pt last saw Tl 03/17/2022, but has seen Bennett Goltz 12/13/2023    Date of last appointment with provider: Mayur: 12/13/2023    Could we send this information to you in [place] or would you prefer to receive a phone call?:   No preference   Okay to leave a detailed message?: Yes at Cell number on file:    Telephone Information:   Mobile [phone]

## 2024-01-02 DIAGNOSIS — G47.419 NARCOLEPSY WITHOUT CATAPLEXY(347.00): Primary | ICD-10-CM

## 2024-01-02 RX ORDER — SODIUM OXYBATE 0.5 G/ML
SOLUTION ORAL
Qty: 414 ML | Refills: 0 | Status: SHIPPED | OUTPATIENT
Start: 2024-01-02 | End: 2024-02-14

## 2024-01-03 NOTE — CONFIDENTIAL NOTE
Xyrem titration dosing was prescribed. Prescription form was completed and faxed on 1/9/24.  Bennett Goltz, PA-C

## 2024-01-09 ENCOUNTER — CARE COORDINATION (OUTPATIENT)
Dept: SLEEP MEDICINE | Facility: CLINIC | Age: 44
End: 2024-01-09

## 2024-01-09 ENCOUNTER — PATIENT OUTREACH (OUTPATIENT)
Dept: CARE COORDINATION | Facility: CLINIC | Age: 44
End: 2024-01-09

## 2024-01-09 ENCOUNTER — TELEPHONE (OUTPATIENT)
Dept: SLEEP MEDICINE | Facility: CLINIC | Age: 44
End: 2024-01-09

## 2024-01-09 NOTE — PROGRESS NOTES
Xyrem prescription form was completed and faxed to [facility] using the [phone] fax number.

## 2024-01-09 NOTE — TELEPHONE ENCOUNTER
Prior Authorization Specialty Medication Request    Medication/Dose: Sodium Oxybate 500mg/ml  Diagnosis and ICD code (if different than what is on RX):    New/renewal/insurance change PA/secondary ins. PA:  Previously Tried and Failed:      Important Lab Values:   Rationale:     Insurance   Primary: [facility] [employer]  Insurance ID:      Secondary (if applicable):  Insurance ID:      Pharmacy Information (if different than what is on RX)  Name:  [facility]  Phone:  [phone]  Fax:[phone]

## 2024-01-09 NOTE — PROGRESS NOTES
Clinic Care Coordination Contact  [facility]/Voicemail    Clinical Data: Care Coordinator Outreach    Outreach Documentation Number of Outreach Attempt   1/9/2024   2:38 PM 1       Left message on patient's voicemail with call back information and requested return call.    Plan: Care Coordinator will try to reach patient again in 3-5 business days.    Nhaomy Thomas [facility]  Social Work Primary Care Clinic Care Coordinator  [facility]  [phone]  [email or web address]

## 2024-01-11 ENCOUNTER — TELEPHONE (OUTPATIENT)
Dept: SLEEP MEDICINE | Facility: CLINIC | Age: 44
End: 2024-01-11

## 2024-01-11 NOTE — TELEPHONE ENCOUNTER
Spoke to pt today and she states she lost her insurance coverage and so there should not be a PA worked on for her Xyrem.

## 2024-01-15 NOTE — PROGRESS NOTES
Clinic Care Coordination Contact  Follow Up Progress Note      Assessment: JESUS MELENDEZ contacted patient for monthly follow up. Patient reports she is doing okay and is just waiting for an Rx to be filled, but otherwise has no concerns lately. Patient reports she plans to use the Rx assistance program through the  for her medication she is waiting on.     Care Gaps:    Health Maintenance Due   Topic Date Due    DEPRESSION ACTION PLAN  Never done    HEPATITIS B IMMUNIZATION (1 of 3 - 3-dose series) Never done    PAP  Never done    INFLUENZA VACCINE (1) 09/01/2023    COVID-19 Vaccine (3 - 2023-24 season) 09/01/2023    CMP  10/19/2023     Care Plans  Care Plan: Mental Health       Problem: Mental Health Symptoms Need Improvement       Goal: Reach out to Mental Health Lines as Needed       Start Date: 11/17/2023 Expected End Date: 11/17/2024    Note:     Barriers: Cost of care   Strengths: willing to receive resources  Patient expressed understanding of goal: yes    Action steps to achieve this goal:  1. I will contact resources provided when needed.  2. I will utilize crisis resources if needed  3. I will work with care coordination for ongoing support and resources.                               Intervention/Education provided during outreach: None     Outreach Frequency: monthly, more frequently as needed    Plan: JESUS MELENDEZ will outreach again in one month to check in.     Nahomy Thomas [facility]  Social Work Primary Care Clinic Care Coordinator  [facility]  [phone]  [email or web address]

## 2024-01-22 ENCOUNTER — TELEPHONE (OUTPATIENT)
Dept: FAMILY MEDICINE | Facility: CLINIC | Age: 44
End: 2024-01-22

## 2024-01-22 NOTE — TELEPHONE ENCOUNTER
Not exactly sure what patient is needing.   I did draft a letter and sent via [employer] to patient.   Please check with patient to see if this is what she is needing. If so, letter can be faxed and mailed as requested.   If more detailed letter needed, may need visit

## 2024-01-22 NOTE — TELEPHONE ENCOUNTER
Forms/Letter Request    Type of form/letter: FMLA - Unknown  [employer]  Do we have the form/letter: No. Patient would like provider to write a letter noting her restrictions and limitations for work.     When is form/letter needed by: Asap    How would you like the form/letter returned: Fax : [phone] and also mail copy to patient      Could we send this information to you in [place] or would you prefer to receive a phone call?:   Patient would prefer a phone call   Okay to leave a detailed message?: Yes at Cell number on file:    Telephone Information:   Mobile [phone]

## 2024-01-22 NOTE — LETTER
January 25, 2024      Jessica Mello  [address]  [place] [address]        To Whom It May Concern,       Jessica Mello is followed in our clinic. Due to severe narcolepsy and fatigue she is unable to work currently and not for the foreseeable future. The extreme fatigue affects her ability to focus and function. She sleeps a lot throughout the day. Also has poor focus and concentration when awake. Treatment is through sleep clinic and has been undergoing medication trials with limited success so far.  Has also worked with psychiatry.     Ability to work will be re-evaluated if symptoms start to improve.     Sincerely,        Liberty Alvarez MD

## 2024-01-22 NOTE — TELEPHONE ENCOUNTER
RN called and spoke with patient. Notified her of provider's response below. She reported that letter that was already written should be adequate enough. She did fax this over to her insurance company. She reported that they are requesting medical records, but patient notified them that there are no records to be sent. She will follow up with clinic if anything else is needed. She had no further questions/concerns at this time.     EDIE Mo, RN   [facility]     Forceps were not used

## 2024-01-22 NOTE — LETTER
January 22, 2024      Jessica Mello  [address]  [place] [address]        To Whom It May Concern:    Jessica Mello is followed in our clinic. Due to severe narcolepsy and fatigue she is unable to work currently. The extreme fatigue affects her ability to focus and function. She sleeps a lot throughout the day. Also has poor focus and concentration when awake.             Sincerely,        Liberty Alvarez MD

## 2024-01-24 ENCOUNTER — TELEPHONE (OUTPATIENT)
Dept: SLEEP MEDICINE | Facility: CLINIC | Age: 44
End: 2024-01-24

## 2024-01-24 NOTE — TELEPHONE ENCOUNTER
Primary provider wrote letter 1/22/24. Patient last seen 12/13/23 in Sleep. Disability restrictions were not addressed in visit. Routing to PCP team to address.     Isela FRAGOSO RN  [facility]

## 2024-01-24 NOTE — TELEPHONE ENCOUNTER
Insurer calling to get update on patients disability claim. Updated needed on restrictions and limitations as well as treatment plan. Please provider the below number when calling back.   REF-NUMBER [id number]

## 2024-01-25 NOTE — TELEPHONE ENCOUNTER
Patient was able to get letter from My Chart and sent it to her insurance, No need to fax or send copy to her.

## 2024-01-25 NOTE — TELEPHONE ENCOUNTER
Updated letter drafted.   Please confirm with patient we can fax to her insurance    See phone note from 1/22/24

## 2024-02-06 ENCOUNTER — TELEPHONE (OUTPATIENT)
Dept: SLEEP MEDICINE | Facility: CLINIC | Age: 44
End: 2024-02-06

## 2024-02-07 NOTE — TELEPHONE ENCOUNTER
Jessica is on 3.75g Xyrem twice nightly. She gets about 2-3 hours of sleep after each dose. She has been trying to push her bedtime later because she has been waking early, around 4-5 AM. She sometimes feels sleepy in the morning and will go back to sleep. When I called at 9:20 AM this morning, she was just waking up. In general, she is less sleepy in the day with Xyrem. She does still feel tired though. That may depend on how active she was the prior night. She says she can generally avoid inadvertent dozing.  She has discontinued Adderall as it was increasing anxiety.     I asked if she has seen psychiatry in the past and she said she was evaluated in 2021 and no diagnosis was given. She was diagnosed with narcolepsy in 2022. She had a trial of several different stimulants and did not tolerate them (modafinil, Sunosi, methylphenidate, Adderall, bupropion). She had side effects of increased anxiety or depression.   She has tried other sleep aids including mirtazapine, trazodone, suvorexant, hydroxyzine, zolpidem.  She feels depression and anxiety came into the picture after the sleepiness (2022). She has tried a number of antidepressants (currently on Cymbalta).     She has tried a SAD lamp in the past and has not felt a difference.     She has had sleep evaluation at [facility] and [facility]. She has also had rheumatology evaluation.       Completing short term disability paperwork that she sent in, although she has already quit the job as she was unable to stay awake at work or maintain concentration. She would have microsleeps.  Bennett Goltz, PA-C       She wrote back on another note. Xyrem 4 g twice nightly was prescribed. 4.5 g was too much  Bennett Goltz, PA-C

## 2024-02-09 ENCOUNTER — CARE COORDINATION (OUTPATIENT)
Dept: SLEEP MEDICINE | Facility: CLINIC | Age: 44
End: 2024-02-09

## 2024-02-12 ENCOUNTER — MYC MEDICAL ADVICE (OUTPATIENT)
Dept: SLEEP MEDICINE | Facility: CLINIC | Age: 44
End: 2024-02-12

## 2024-02-12 DIAGNOSIS — G47.419 NARCOLEPSY WITHOUT CATAPLEXY(347.00): ICD-10-CM

## 2024-02-13 ENCOUNTER — MYC MEDICAL ADVICE (OUTPATIENT)
Dept: SLEEP MEDICINE | Facility: CLINIC | Age: 44
End: 2024-02-13

## 2024-02-14 ENCOUNTER — PATIENT OUTREACH (OUTPATIENT)
Dept: CARE COORDINATION | Facility: CLINIC | Age: 44
End: 2024-02-14

## 2024-02-14 RX ORDER — SODIUM OXYBATE 0.5 G/ML
SOLUTION ORAL
Qty: 480 ML | Refills: 3 | Status: SHIPPED | OUTPATIENT
Start: 2024-02-14 | End: 2024-06-05

## 2024-02-14 RX ORDER — SODIUM OXYBATE 0.5 G/ML
SOLUTION ORAL
Qty: 480 ML | Refills: 0 | Status: SHIPPED | OUTPATIENT
Start: 2024-02-14 | End: 2024-02-14

## 2024-02-14 NOTE — TELEPHONE ENCOUNTER
I am prescribing Xyrem 4 g twice nightly (with 3 refills). 4.5 g twice nightly was too much. She was doing better, but still waking early with 3.75 g twice nightly.     Bennett Goltz, PA-C

## 2024-02-14 NOTE — PROGRESS NOTES
Clinic Care Coordination Contact  Follow Up Progress Note      Assessment: JESUS MELENDEZ outreached to patient for follow up. Patient reports she is doing better and has started a new treatment for her narcolepsy which she feels is starting to make progress with symptom management. Patient reports she is hoping to start the process for LTD in March.     Care Gaps:    Health Maintenance Due   Topic Date Due    DEPRESSION ACTION PLAN  Never done    MAMMO SCREENING  Never done    HEPATITIS B IMMUNIZATION (1 of 3 - 3-dose series) Never done    PAP  Never done    INFLUENZA VACCINE (1) 09/01/2023    COVID-19 Vaccine (3 - 2023-24 season) 09/01/2023    CMP  10/19/2023     Care Plans  Care Plan: Mental Health       Problem: Mental Health Symptoms Need Improvement       Goal: Reach out to Mental Health Lines as Needed       Start Date: 11/17/2023 Expected End Date: 11/17/2024    Note:     Barriers: Cost of care   Strengths: willing to receive resources  Patient expressed understanding of goal: yes    Action steps to achieve this goal:  1. I will contact resources provided when needed.  2. I will work with care coordination for ongoing support and resources.                               Intervention/Education provided during outreach: N/A     Outreach Frequency: monthly, more frequently as needed    Plan: JESUS MEELNDEZ will outreach again in one month.    Nahomy Thomas [facility]  Social Work Primary Care Clinic Care Coordinator  [facility]  [phone]  [email or web address]

## 2024-02-14 NOTE — LETTER
[facility] CARE COORDINATION  [address]  [place] [address]    February 14, 2024        Jessica MIRZA Riaz  [address]  [place] [address]          Dear Shai Lopez is an updated Patient Centered Plan of Care for your continued enrollment in Care Coordination. Please let us know if you have additional questions, concerns, or goals that we can assist with.    Sincerely,    Nahomy Thomas, [facility]  Social Work Primary Care Clinic Care Coordinator  [facility]  [phone]  [email or web address]  Patient Centered Plan of Care  About Me:        Patient Name:  Jessica Mello    YOB: 1980  Age:         43 year old   [place] MRN:    [id number] Telephone Information:  Home Phone [phone]   Mobile [phone]       Address:  [address] [place] [address] [address]  [place] [address] Email address:  [email or web address]      Emergency Contact(s)    Name Relationship Lgl Grd Work Phone Home Phone Mobile Phone   1. MAEGAN LYNN Sister   [phone]            Primary language:  English     needed? No   [facility] [employer]:  [phone] op. 1  Other communication barriers:None    Preferred Method of Communication:     Current living arrangement: I live alone    Mobility Status/ Medical Equipment: Independent        Health Maintenance  Health Maintenance Reviewed:   Health Maintenance Due   Topic Date Due    DEPRESSION ACTION PLAN  Never done    MAMMO SCREENING  Never done    HEPATITIS B IMMUNIZATION (1 of 3 - 3-dose series) Never done    PAP  Never done    INFLUENZA VACCINE (1) 09/01/2023    COVID-19 Vaccine (3 - 2023-24 season) 09/01/2023    CMP  10/19/2023       My Access Plan  Medical Emergency 911   Primary Clinic Line [facility] - [phone]   24 Hour Appointment Line [phone] or  [phone]) (toll-free)   24 Hour Nurse Line  [phone] (toll-free)   Preferred Urgent Care [facility], [phone]     [facility], [place]  [phone]     Preferred Pharmacy [facility] [address] [place] [address] N.     Behavioral Health Crisis Line The National Suicide Prevention Lifeline at 1-407.716.6617 or Text/Call 988           My Care Team Members  Patient Care Team         Relationship Specialty Notifications Start End    Liberty Alvarez MD PCP - General Family Medicine  12/21/21     Phone: [phone] Fax: [phone] [address] [facility] [address]    Liberty Alvarez MD Assigned PCP   5/6/21     Phone: [phone] Fax: [phone] [address] [facility] [address]    Nory Costello DO Assigned Cancer Care Provider   10/22/22     Phone: [phone] Fax: [phone] [address] [place] [address] [facility] [address]    Jessica Grullon, RN Specialty Care Coordinator Hematology & Oncology Admissions 11/19/22     Phone: [phone]         Brooks Hale MD Assigned Surgical Provider   3/25/23     Phone: [phone] Fax: [phone] [address] [facility] [address]    Joseph Delaney LMFT Assigned Behavioral Health Provider   5/27/23      [facility] [facility] [address]    Sotero Downey MD Assigned Sleep Provider   7/8/23     Phone: [phone] Fax: [phone] [address] [place] [address]    Trisha Samson MD Assigned Allergy Provider   7/22/23     Phone: [phone] Fax: [phone] [address] [facility] [address]    Maci Saldana APRN CNP Nurse Practitioner Dermatology  10/4/23     Phone: [phone] Fax: [phone] [address] [facility] [address]    Nahomy Thomas, [facility] Lead Care Coordinator  Admissions 11/15/23     Phone: [phone]         Goltz, Bennett Ezra, PA-C Assigned Neuroscience  Provider   12/23/23     Phone: [phone] Fax: [phone] [address] [facility] [[email or web address] [address] [facility] [address]                My Care Plans  Self Management and Treatment Plan    Care Plan  Care Plan: Mental Health       Problem: Mental Health Symptoms Need Improvement       Goal: Reach out to Mental Health Lines as Needed       Start Date: 11/17/2023 Expected End Date: 11/17/2024    Note:     Barriers: Cost of care   Strengths: willing to receive resources  Patient expressed understanding of goal: yes    Action steps to achieve this goal:  1. I will contact resources provided when needed.  2. I will work with care coordination for ongoing support and resources.                               Action Plans on File:                       Advance Care Plans/Directives:   Advanced Care Plan/Directives on file: No    Discussed with patient/caregiver(s): No data recorded           My Medical and Care Information  Problem List   Patient Active Problem List   Diagnosis    Smoker    Other depression    Anxiety    Fatigue, unspecified type    Narcolepsy without cataplexy(347.00)    Major depression, recurrent (H24)    Abnormal serum iron level    Nonallergic rhinitis      Current Medications and Allergies:    Current Outpatient Medications   Medication    amphetamine-dextroamphetamine (ADDERALL) 10 MG tablet    cholecalciferol (VITAMIN D3) 25 mcg (1000 units) capsule    DULoxetine (CYMBALTA) 30 MG capsule    fluticasone (FLONASE) 50 MCG/ACT nasal spray    hydrOXYzine HCl (ATARAX) 25 MG tablet    Multiple Vitamin (MULTIVITAMIN ADULT PO)    sertraline (ZOLOFT) 50 MG tablet    Sodium Oxybate 500 MG/ML SOLN     No current facility-administered medications for this visit.     Care Coordination Start Date: 11/14/2023   Frequency of Care Coordination: monthly, more frequently as needed     Form Last Updated: 02/14/2024

## 2024-02-14 NOTE — CONFIDENTIAL NOTE
Short term disability paperwork forms were completed and faxed to [facility] using the [phone] fax number.

## 2024-03-02 ENCOUNTER — HEALTH MAINTENANCE LETTER (OUTPATIENT)
Age: 44
End: 2024-03-02

## 2024-03-14 ENCOUNTER — TELEPHONE (OUTPATIENT)
Dept: SLEEP MEDICINE | Facility: CLINIC | Age: 44
End: 2024-03-14

## 2024-03-14 NOTE — TELEPHONE ENCOUNTER
Reason for Call:  Other call back    Detailed comments: [employer] called and states that patient seen with Bennett Goltz PA back on December 13, 2023    Notes states a provider Jonel Gomez MD but they cannot find any reference or this provider.    Tried calling [facility] and they stated no provider with this name.    Would like a callback on further notes needed to obtain.    Thank you.    Phone Number Patient can be reached at: Other phone number:  [phone] *    Best Time: any    Can we leave a detailed message on this number? YES    Call taken on 3/14/2024 at 8:45 AM by Emily Monson

## 2024-03-15 ENCOUNTER — PATIENT OUTREACH (OUTPATIENT)
Dept: CARE COORDINATION | Facility: CLINIC | Age: 44
End: 2024-03-15

## 2024-03-15 NOTE — Clinical Note
Hello, wanted you to be aware- patient reports she stopped her duloxetine and had severe withdrawals for two weeks afterwards, stating she was going through jennifer. She also states she refuses to speak to a therapist going forward due to this.  I am closing her out of care coordination at her request, thank you.

## 2024-03-15 NOTE — TELEPHONE ENCOUNTER
Provider information provided. HIMS information provided for [facility] records     Isela FRAGOSO RN  [facility]

## 2024-03-15 NOTE — PROGRESS NOTES
Clinic Care Coordination Contact  Follow Up Progress Note      Assessment: JESUS MELENDEZ outreached to patient for monthly follow up. Patient currently pursuing long term disability through her employer. Patient reports this can take up to 45 days but she does have plans in place to take care of her finances during this time.     JESUS MELENDEZ brought up patients care coordination goal of mental health and patient reports she will never see a therapist again, stating issues with her Duloxetine and reports she went through two weeks of jennifer after stopping. JESUS MELENDEZ asked patient if she discussed this with her PCP and patient said no, but she plans to. Patient reports she was not told the side effects could be as bad as they were. Patient reports she doesn't know if she will ever recover from the severe withdrawals she had from discontinuing antidepressants, stating she started to spiral and get manic. Patient reports she is now sleeping ok due to sleep medications.     Patient reports no concerns today and denies ongoing need for care coordination support, but did ask for disability legal resources, JESUS MELENDEZ will send [facility]  info via [employer].     Care Gaps:    Health Maintenance Due   Topic Date Due    DEPRESSION ACTION PLAN  Never done    MAMMO SCREENING  Never done    HEPATITIS B IMMUNIZATION (1 of 3 - 19+ 3-dose series) Never done    PAP  Never done    INFLUENZA VACCINE (1) 09/01/2023    COVID-19 Vaccine (3 - 2023-24 season) 09/01/2023    CMP  10/19/2023     Plan: JESUS MELENDEZ to send disability legal resources to patients [employer] and FYI to PCP. Closing out care coordination program as patient does not wish to get monthly calls anymore.     Nahomy Thomas, [facility]  Social Work Primary Care Clinic Care Coordinator  [facility]  [phone]  [email or web address]

## 2024-04-10 ENCOUNTER — TELEPHONE (OUTPATIENT)
Dept: FAMILY MEDICINE | Facility: CLINIC | Age: 44
End: 2024-04-10

## 2024-04-10 NOTE — TELEPHONE ENCOUNTER
Spoke to pt and she said that she believes this is a provider from [facility] but will call them to get clarification and get back to us via [employer]. If provider is from [facility] and pt says it is ok to speak with them then we do have a signed LIDIA on file for [facility] from 10/20/23 which is valid for 1 year.    Sending to PCP as FYI

## 2024-04-10 NOTE — TELEPHONE ENCOUNTER
Dr. Hampton calling about restrictions and limitations for pt.    He is available to 3 pm eastern time and would like Dr. Alvarez to call him.  He states this is non urgent.   Please call him when you get a chance.    Susan ARTEAGAN, RN

## 2024-04-10 NOTE — TELEPHONE ENCOUNTER
Please clarify who this physician is.  Does he work with disability?  Do we have an LIDIA on file for me to talk to him?

## 2024-04-11 ENCOUNTER — MYC MEDICAL ADVICE (OUTPATIENT)
Dept: FAMILY MEDICINE | Facility: CLINIC | Age: 44
End: 2024-04-11

## 2024-04-11 NOTE — TELEPHONE ENCOUNTER
Routing to provider to review and advise per patient request.    Robert De La Rosa RN  [facility]

## 2024-04-12 ENCOUNTER — TELEPHONE (OUTPATIENT)
Dept: FAMILY MEDICINE | Facility: CLINIC | Age: 44
End: 2024-04-12

## 2024-04-12 NOTE — TELEPHONE ENCOUNTER
See patient's text messages. We have LIDIA to communicate with [employer]  Spoke with Dr. Hampton- he had questions regarding her ongoing cares. Update given

## 2024-04-12 NOTE — TELEPHONE ENCOUNTER
Per my conversation with provider at [facility] earlier today, this form can be disregarded.

## 2024-04-12 NOTE — TELEPHONE ENCOUNTER
Forms/Letter Request    Type of form/letter: [facility] Claim Number [id number] Policy # [id number]    Do we have the form/letter: Yes: placed form in providers in box for review/completion.    How would you like the form/letter returned: Fax : [phone]

## 2024-04-15 ENCOUNTER — TELEPHONE (OUTPATIENT)
Dept: SLEEP MEDICINE | Facility: CLINIC | Age: 44
End: 2024-04-15

## 2024-04-15 NOTE — TELEPHONE ENCOUNTER
General Call    Contacts         Type Contact Phone/Fax    04/15/2024 02:41 PM CDT Phone (Incoming) Dr. Hampton      [employer]          Reason for Call:  disability restrictions and limitations    What are your questions or concerns:  Discuss limitations    Date of last appointment with provider: 12/13/2023    Could we send this information to you in [employer] or would you prefer to receive a phone call?:   Patient would prefer a phone call   Okay to leave a detailed message?: Yes at Other phone number:  Dr. Hampton [phone]*

## 2024-04-16 NOTE — TELEPHONE ENCOUNTER
Spoke with Dr Hampton.     He would like to speak to provider for clarification of situation. He will also be sending form by fax if provider would like to answer that way.     Isela FRAGOSO RN  [facility]

## 2024-04-17 ENCOUNTER — TELEPHONE (OUTPATIENT)
Dept: SLEEP MEDICINE | Facility: CLINIC | Age: 44
End: 2024-04-17

## 2024-04-17 NOTE — TELEPHONE ENCOUNTER
"Paperwork was received which state that documentation does not support that she should be precluded from full-time work, as her sleep study does not actually meet criteria for narcolepsy.     \"Pertinent work up included 2-week actigraphy reported sufficient sleep with 8 hours of mean TST. PSG showed sleep latency of 2.6 minutes and REM latency of 132 minutes total sleep time of 437 minutes. AHI of 0.1/hour. MSLT with 5 nap opportunities showed 2 SOREMPs, but mean sleep latency was 12.3 minutes (20 min, 7 min, 6 min, 17.5 min, 0.5 min, and 10.5 min), though results are inconsistent when the table was compared to actual report with impression. She apparently fell asleep prior to the first nap and had to be woken up for it. Urine tox was not obtained. She was on fluoxetine during the testing.     The table on the MSLT report shows a mean latency of 6.8 min (with the first nap latency being 4.3 min). The 2 REM periods occurred on the last 2 naps.     I've only seen the patient once. She was previously managed by other providers in our group. She has had adverse effects from modafinil and methylphenidate. She is on Adderall and Xyrem.       I spoke with Dr. Hamptno from [facility] and discussed her case. The summary agreement was that the prior testing was not 100% clearly diagnostic of narcolepsy but that there may be other factors contributing to her symptoms (mental health, possible hypermobility syndrome). Her reported symptoms do suggest impairment in her ability to carry out daily work functions (frequent microsleeps at work and impaired concentration). Dr. Hampton stated that he would be sending the case for a second level review and he would send me a copy of his letter.      I called Jessica to discuss her symptoms again and let her know about my discussion with Dr. Hampton. She states she has retained  because she feels there has been untoward issues with how [facility] has handled her case.    She says her mood " "is fine aside from the recent stress. However, she has been having panic attacks (later she said hypomania with lots of energy, related to withdrawal from duloxetine). Her psychiatric medications were being managed by a NP psychiatrist. She says she has had bad experiences with psychiatric providers and all of the medications she has been on, so she does not trust them and she wants to be done with them. She notices she starts to get worse insomnia with SSRIs and SNRIs and one of her prior psychiatric providers kept prescribing sedative after sedative rather than identifying that it was a side effect of the fluoxetine that she was on. However, she is not off of all SSRIs/SNRIs and still requires Xyrem to get 7 hours of sleep.      The last script for Adderall was in December and she still has plenty left. She has gotten elevated heart rate and feels \"off\" when on more than 5 mg Adderall. She has only been taking it for emergencies.     She has tremors and feels weak and shaky in the morning. She is still taking Xyrem 4 g twice nightly and is getting about 3-4 hours of sleep with each dose. The tremors were worse on 4.5 g. She estimates she is getting about 7 hours of sleep total.   She does not feel better from naps. She sometimes just wants to sleep more.      She says several years ago, when not on any antidepressants or of sleep-related medications, she could sleep 12 hours and never feel refreshed. That sounds more consistent with idiopathic hypersomnia. Her symptoms now are not consistent with idiopathic hypersomnia and would be somewhat more consistent with narcolepsy. Obviously, one does not convert into the other. I mentioned that the variability of her sleep symptoms argues against this being solely a sleep disorder and that mental health might explain the variability and her response to some of the medications also might point to that. She said it is convenient to just point to mental health when the exact " issue is not clear, which is a fair point.  She says the main issue is that she has to focus on doing what she needs to to get through each day and trying to work on top of that is not possible.  Bennett Goltz, PA-C

## 2024-04-17 NOTE — TELEPHONE ENCOUNTER
Reason for Call:  Other call back    Detailed comments: Insurance company called to verify fax received for patient.    Ref Number:  [id number]    Please contact them back.  Thank you.    Phone Number Patient can be reached at: Other phone number:  [phone] *    Best Time: any    Can we leave a detailed message on this number? YES    Call taken on 4/17/2024 at 2:04 PM by Emily Monson

## 2024-05-14 NOTE — TELEPHONE ENCOUNTER
Had tried to contact Dr. Hampton again. Left message.   Reviewed most recent forms. There was no new questions noted. See sleep clinic is actively contributing to information needed as suspect that is most of what is needed

## 2024-05-28 ASSESSMENT — ANXIETY QUESTIONNAIRES
2. NOT BEING ABLE TO STOP OR CONTROL WORRYING: NEARLY EVERY DAY
7. FEELING AFRAID AS IF SOMETHING AWFUL MIGHT HAPPEN: NEARLY EVERY DAY
GAD7 TOTAL SCORE: 20
5. BEING SO RESTLESS THAT IT IS HARD TO SIT STILL: MORE THAN HALF THE DAYS
3. WORRYING TOO MUCH ABOUT DIFFERENT THINGS: NEARLY EVERY DAY
6. BECOMING EASILY ANNOYED OR IRRITABLE: NEARLY EVERY DAY
GAD7 TOTAL SCORE: 20
1. FEELING NERVOUS, ANXIOUS, OR ON EDGE: NEARLY EVERY DAY
IF YOU CHECKED OFF ANY PROBLEMS ON THIS QUESTIONNAIRE, HOW DIFFICULT HAVE THESE PROBLEMS MADE IT FOR YOU TO DO YOUR WORK, TAKE CARE OF THINGS AT HOME, OR GET ALONG WITH OTHER PEOPLE: EXTREMELY DIFFICULT
8. IF YOU CHECKED OFF ANY PROBLEMS, HOW DIFFICULT HAVE THESE MADE IT FOR YOU TO DO YOUR WORK, TAKE CARE OF THINGS AT HOME, OR GET ALONG WITH OTHER PEOPLE?: EXTREMELY DIFFICULT
4. TROUBLE RELAXING: NEARLY EVERY DAY
7. FEELING AFRAID AS IF SOMETHING AWFUL MIGHT HAPPEN: NEARLY EVERY DAY
GAD7 TOTAL SCORE: 20

## 2024-05-28 ASSESSMENT — PATIENT HEALTH QUESTIONNAIRE - PHQ9
SUM OF ALL RESPONSES TO PHQ QUESTIONS 1-9: 21
SUM OF ALL RESPONSES TO PHQ QUESTIONS 1-9: 21
10. IF YOU CHECKED OFF ANY PROBLEMS, HOW DIFFICULT HAVE THESE PROBLEMS MADE IT FOR YOU TO DO YOUR WORK, TAKE CARE OF THINGS AT HOME, OR GET ALONG WITH OTHER PEOPLE: EXTREMELY DIFFICULT

## 2024-05-29 ENCOUNTER — TELEPHONE (OUTPATIENT)
Dept: FAMILY MEDICINE | Facility: CLINIC | Age: 44
End: 2024-05-29

## 2024-05-29 ENCOUNTER — OFFICE VISIT (OUTPATIENT)
Dept: FAMILY MEDICINE | Facility: CLINIC | Age: 44
End: 2024-05-29
Payer: MEDICAID

## 2024-05-29 VITALS
BODY MASS INDEX: 18.25 KG/M2 | OXYGEN SATURATION: 100 % | WEIGHT: 103 LBS | SYSTOLIC BLOOD PRESSURE: 127 MMHG | DIASTOLIC BLOOD PRESSURE: 76 MMHG | TEMPERATURE: 98.6 F | HEIGHT: 63 IN | HEART RATE: 76 BPM | RESPIRATION RATE: 12 BRPM

## 2024-05-29 DIAGNOSIS — G47.419 NARCOLEPSY WITHOUT CATAPLEXY(347.00): Primary | ICD-10-CM

## 2024-05-29 DIAGNOSIS — F41.9 ANXIETY: ICD-10-CM

## 2024-05-29 DIAGNOSIS — R53.83 FATIGUE, UNSPECIFIED TYPE: ICD-10-CM

## 2024-05-29 DIAGNOSIS — R63.6 UNDERWEIGHT: ICD-10-CM

## 2024-05-29 DIAGNOSIS — F32.1 MODERATE MAJOR DEPRESSION (H): ICD-10-CM

## 2024-05-29 LAB
BASOPHILS # BLD AUTO: 0.1 10E3/UL (ref 0–0.2)
BASOPHILS NFR BLD AUTO: 1 %
EOSINOPHIL # BLD AUTO: 0.1 10E3/UL (ref 0–0.7)
EOSINOPHIL NFR BLD AUTO: 2 %
ERYTHROCYTE [DISTWIDTH] IN BLOOD BY AUTOMATED COUNT: 12.2 % (ref 10–15)
HCT VFR BLD AUTO: 37.4 % (ref 35–47)
HGB BLD-MCNC: 12.5 G/DL (ref 11.7–15.7)
IMM GRANULOCYTES # BLD: 0 10E3/UL
IMM GRANULOCYTES NFR BLD: 0 %
LYMPHOCYTES # BLD AUTO: 1.4 10E3/UL (ref 0.8–5.3)
LYMPHOCYTES NFR BLD AUTO: 25 %
MCH RBC QN AUTO: 29.3 PG (ref 26.5–33)
MCHC RBC AUTO-ENTMCNC: 33.4 G/DL (ref 31.5–36.5)
MCV RBC AUTO: 88 FL (ref 78–100)
MONOCYTES # BLD AUTO: 0.4 10E3/UL (ref 0–1.3)
MONOCYTES NFR BLD AUTO: 7 %
NEUTROPHILS # BLD AUTO: 3.5 10E3/UL (ref 1.6–8.3)
NEUTROPHILS NFR BLD AUTO: 65 %
PLATELET # BLD AUTO: 223 10E3/UL (ref 150–450)
RBC # BLD AUTO: 4.27 10E6/UL (ref 3.8–5.2)
WBC # BLD AUTO: 5.4 10E3/UL (ref 4–11)

## 2024-05-29 PROCEDURE — 99214 OFFICE O/P EST MOD 30 MIN: CPT | Performed by: FAMILY MEDICINE

## 2024-05-29 PROCEDURE — 84134 ASSAY OF PREALBUMIN: CPT | Performed by: FAMILY MEDICINE

## 2024-05-29 PROCEDURE — 36415 COLL VENOUS BLD VENIPUNCTURE: CPT | Performed by: FAMILY MEDICINE

## 2024-05-29 PROCEDURE — 80053 COMPREHEN METABOLIC PANEL: CPT | Performed by: FAMILY MEDICINE

## 2024-05-29 PROCEDURE — 82728 ASSAY OF FERRITIN: CPT | Performed by: FAMILY MEDICINE

## 2024-05-29 PROCEDURE — 85025 COMPLETE CBC W/AUTO DIFF WBC: CPT | Performed by: FAMILY MEDICINE

## 2024-05-29 PROCEDURE — 82607 VITAMIN B-12: CPT | Performed by: FAMILY MEDICINE

## 2024-05-29 ASSESSMENT — PAIN SCALES - GENERAL: PAINLEVEL: MILD PAIN (2)

## 2024-05-29 NOTE — LETTER
June 11, 2024      Jessica J Riaz  [address] [facility] [facility] [address]        Dear Lori Calix this is  and I am covering for   Kidney function tests are normal except for slightly elevated calcium at 10.3  This can be seen with dehydration but also can be seen if you are taking too much vitamin D or calcium supplements  Since it is only very minimally elevated there is no concern about it but please make sure that you drink plenty of fluids and do not take more than 1000 units of vitamin D every day  Prealbumin level is normal  B12 level is normal  Ferritin level is normal  Liver function tests are normal               Resulted Orders   Vitamin B12   Result Value Ref Range    Vitamin B12 305 232 - 1,245 pg/mL   Prealbumin   Result Value Ref Range    Prealbumin 23.3 20.0 - 40.0 mg/dL   CBC with platelets and differential   Result Value Ref Range    WBC Count 5.4 4.0 - 11.0 10e3/uL    RBC Count 4.27 3.80 - 5.20 10e6/uL    Hemoglobin 12.5 11.7 - 15.7 g/dL    Hematocrit 37.4 35.0 - 47.0 %    MCV 88 78 - 100 fL    MCH 29.3 26.5 - 33.0 pg    MCHC 33.4 31.5 - 36.5 g/dL    RDW 12.2 10.0 - 15.0 %    Platelet Count 223 150 - 450 10e3/uL    % Neutrophils 65 %    % Lymphocytes 25 %    % Monocytes 7 %    % Eosinophils 2 %    % Basophils 1 %    % Immature Granulocytes 0 %    Absolute Neutrophils 3.5 1.6 - 8.3 10e3/uL    Absolute Lymphocytes 1.4 0.8 - 5.3 10e3/uL    Absolute Monocytes 0.4 0.0 - 1.3 10e3/uL    Absolute Eosinophils 0.1 0.0 - 0.7 10e3/uL    Absolute Basophils 0.1 0.0 - 0.2 10e3/uL    Absolute Immature Granulocytes 0.0 <=0.4 10e3/uL       If you have any questions or concerns, please call the clinic at the number listed above.       Sincerely,      Main Novoa MD

## 2024-05-29 NOTE — LETTER
[facility]  06/28/24  Patient: Jessica Mello  YOB: 1980  Medical Record Number: [id number]                                                                                  Non-Opioid Controlled Substance Agreement    This is an agreement between you and your provider regarding safe and appropriate use of controlled substances prescribed by your care team. Controlled substances are?medicines that can cause physical and mental dependence (abuse).     There are strict laws about having and using these medicines. We here at [facility] are  committed to working with you in your efforts to get better. To support you in this work, we'll help you schedule regular office appointments for medicine refills. If we must cancel or change your appointment for any reason, we'll make sure you have enough medicine to last until your next appointment.     As a Provider, I will:   Listen carefully to your concerns while treating you with respect.   Recommend a treatment plan that I believe is in your best interest and may involve therapies other than medicine.    Talk with you often about the possible benefits and the risk of harm of any medicine that we prescribe for you.  Assess the safety of this medicine and check how well it works.    Provide a plan on how to taper (discontinue or go off) using this medicine if the decision is made to stop its use.      ::  As a Patient, I understand controlled substances:     Are prescribed by my care provider to help me function or work and manage my condition(s).?  Are strong medicines and can cause serious side effects.     Need to be taken exactly as prescribed.?Combining controlled substances with certain medicines or chemicals (such as illegal drugs, alcohol, sedatives, sleeping pills, and benzodiazepines) can be dangerous or even fatal.? If I stop taking my medicines suddenly, I may have severe withdrawal symptoms.     The risks,  benefits, and side effects of these medicine(s) were explained to me. I agree that:    I will take part in other treatments as advised by my care team. This may be psychiatry or counseling, physical therapy, behavioral therapy, group treatment or a referral to specialist.    I will keep all my appointments and understand this is part of the monitoring of controlled substances.?My care team may require an office visit for EVERY controlled substance refill. If I miss appointments or don t follow instructions, my care team may stop my medicine    I will take my medicines as prescribed. I will not change the dose or schedule unless my care team tells me to. There will be no refills if I run out early.      I may be asked to come to the clinic and complete a urine drug test or complete a pill count. If I don t give a urine sample or participate in a pill count, the care team may stop my medicine.    I will only receive controlled substance prescriptions from this clinic. If I am treated by another provider, I will tell them that I am taking controlled substances and that I have a treatment agreement with this provider. I will inform my [facility] care team within one business day if I am given a prescription for any controlled substance by another healthcare provider. My [facility] care team can contact other providers and pharmacists about my use of any medicines.    It is up to me to make sure that I don't run out of my medicines on weekends or holidays.?If my care team is willing to refill my prescription without a visit, I must request refills only during office hours. Refills may take up to 3 business days to process. I will use one pharmacy to fill all my controlled substance prescriptions. I will notify the clinic about any changes to my insurance or medicine availability.    I am responsible for my prescriptions. If the medicine/prescription is lost, stolen or destroyed, it will not be replaced.?I  also agree not to share controlled substance medicines with anyone.     I am aware I should not use any illegal or recreational drugs. I agree not to drink alcohol unless my care team says I can.     If I enroll in the [place] Medical Cannabis program, I will tell my care team before my next refill.    I will tell my care team right away if I become pregnant, have a new medical problem treated outside of my regular clinic, or have a change in my medicines.     I understand that this medicine can affect my thinking, judgment and reaction time.? Alcohol and drugs affect the brain and body, which can affect the safety of my driving. Being under the influence of alcohol or drugs can affect my decision-making, behaviors, personal safety and the safety of others. Driving while impaired (DWI) can occur if a person is driving, operating or in physical control of a car, motorcycle, boat, snowmobile, ATV, motorbike, off-road vehicle or any other motor vehicle ([place] Statute 169A.20). I understand the risk if I choose to drive or operate any vehicle or machinery.    I understand that if I do not follow any of the conditions above, my prescriptions or treatment may be stopped or changed.   I agree that my provider, clinic care team and pharmacy may work with any city, state or federal law enforcement agency that investigates the misuse, sale or other diversion of my controlled medicine. I will allow my provider to discuss my care with, or share a copy of, this agreement with any other treating provider, pharmacy or emergency room where I receive care.     I have read this agreement and have asked questions about anything I did not understand.    ________________________________________________________  Patient Signature - Jessica Mello     ___________________                   Date     ________________________________________________________  Provider Signature - Liberty Alvarez MD       ___________________                    Date     ________________________________________________________  Witness Signature (required if provider not present while patient signing)          ___________________                   Date

## 2024-05-29 NOTE — PROGRESS NOTES
Assessment & Plan     Narcolepsy without cataplexy(347.00)  Fatigue, unspecified type  Modest improvement with with sodium oxybate but continues to struggle with continuous activities.  Can perform tasks for short periods at a time with breaks but continues to have low focus, motivation and ongoing fatigue.  I continue to support that she is not able to work at this time with her current symptomatology.  I will fill out her forms for the [facility] and have these faxed in for her.  She will continue to follow-up with her sleep clinic.  - COMPREHENSIVE METABOLIC PANEL; Future  - CBC with Platelets & Differential; Future  - Ferritin; Future  - Vitamin B12; Future  - Prealbumin; Future  - COMPREHENSIVE METABOLIC PANEL  - CBC with Platelets & Differential  - Ferritin  - Vitamin B12  - Prealbumin    Moderate major depression (H)  Anxiety  Not well-controlled but she has had significant side effects from medication trials.  She is not open to meeting with psychiatry at this point given her recent struggles with withdrawal from Cymbalta.  Thankfully, she is reestablishing with counseling which I think will be quite helpful.    Underweight  Likely due to above.  We discussed importance of protein shakes especially if missing a meal and starting a B complex or multivitamin given limited meat intake.  - Prealbumin; Future  - Prealbumin        Follow-up will be as needed since she is moving to [place] [facility].  I am happy to continue to follow-up with her as needed but I also encouraged her to establish with a provider there for more acute issues especially if she will be there long-term.      Jett Lopez is a 43 year old, presenting for the following health issues:  Follow Up        5/29/2024     3:39 PM   Additional Questions   Roomed by Alyse   Accompanied by self     History of Present Illness       Mental Health Follow-up:  Patient presents to follow-up on Depression & Anxiety.Patient's depression  "since last visit has been:  Medium  The patient is having other symptoms associated with depression.  Patient's anxiety since last visit has been:  Worse  The patient is having other symptoms associated with anxiety.  Any significant life events: financial concerns, housing concerns and health concerns  Patient is feeling anxious or having panic attacks.  Patient has no concerns about alcohol or drug use.    Reason for visit:  Health update and lab work    She eats 2-3 servings of fruits and vegetables daily.She consumes 0 sweetened beverage(s) daily.She exercises with enough effort to increase her heart rate 9 or less minutes per day.  She exercises with enough effort to increase her heart rate 3 or less days per week.   She is taking medications regularly.     Denied long-term disability  Has [place] forms from the state she would like me to fill out to help with financial assistance    Started sodium oxidate in January with sleep provider.     In December psych had changed sertraline to duloxetine and was causing sleep disturbances so duloxetine was d/c'd end of Feb. March into May had \"severe withdrawel symptoms\". Reports developed \"Manic episodes\" when dose first dropped from 60 mg 30 mg,  Did 2 weeks of 30 mg and then stopped.  With dropping dose had high hr/bp, mood disturbances.  \"Rough few months\".    Also recent stress very high from the long-term disability decision process.  In April started getting bad panic attacks, feeling more detached  Continues to have sleep disturbances.     Currently feels maybe mood is evening out a little bit but still hasn't felt right since duloxetine  Called cope line for the last week and has been speaking with counselor again. Hopefully will get set up with long term therapist  Moving out of house as has no income any longer  Will be staying with her grandpa until can figure things out.   Has care coordinator # and will call if need but using [facility] resources.     Has not " "followed up with psychiatry. Feels past psychiatrist keep feeding her meds and don't trust them. Feels meds Never effective and have adverse effects.   Reporting all the anti-depressants have caused worsening sleep issues/insomnia  Not taking adderall unless she has to drive might use it.     Hydroxyzine prn for panic but does not use regularly for sleep as causes fatigue but interrupted restless sleep    Wants to avoid new meds for now to get a new baseline and try to manage her mood sx's on her own given past rxns to meds.     Currently sleep- up and down. Bed 10:30 pm, wake several times a night and toss and turn. Tend to get 4.5-7 hours sleep per night  Activity levels vary. If too active will crash. Have to pace her self.   Now able to stay awake more than in the past on the sodium oxybate  but still feel tired/low energy but it has been definitely helpful to get up on the morning (used to take hours to get out of bed). Try to avoid naps to help sleep at night.   Appt with sleep specialist on 6/14/24. Planning to move some time after that    Today mood is \"okay\", \"trying to let it go\". Trying to focus on what she can do instead of what she is expected to do.  Got some benefits through human services for food and assistance.   Need to apply to [employer]  Anxiety is up.  Trying to walk dog more  Try to break day into small chunks to be able to to continue  No self harm thoughts.     Trying to eat more. At one point weight got below 100. No appetite and forcing self to eat. Trying to eat fresh foods as worried about too much sodium on the sodium acetate although also notes might eat junk food/carb crave at times. (Never has had an appetite  but also side effect of med, also stress contributing). Eat limited meat. Nuts/dairy for protein  Did recently started protein shakes again. No longer dropping in weight  Stooling okay.   No gerd/abd pain          Objective    /76 (BP Location: Right arm, Patient Position: " "Sitting, Cuff Size: Adult Regular)   Pulse 76   Temp 98.6  F (37  C) (Oral)   Resp 12   Ht 1.6 m (5' 3\")   Wt 46.7 kg (103 lb)   LMP 05/20/2024 (Exact Date)   SpO2 100%   BMI 18.25 kg/m    Body mass index is 18.25 kg/m .  Physical Exam   GENERAL: thin appearing, alert and no distress. Appears fatigued  NECK: no adenopathy, no asymmetry, masses, or scars  RESP: lungs clear to auscultation - no rales, rhonchi or wheezes  CV: regular rate and rhythm, normal S1 S2, no S3 or S4, no murmur, click or rub, no peripheral edema  ABDOMEN: soft, nontender, no hepatosplenomegaly, no masses and bowel sounds normal  MS: no gross musculoskeletal defects noted, no edema  PSYCH: mentation appears normal, affect flat, appearance well groomed, and speech is goal directed            Signed Electronically by: Liberty Alvarez MD    "

## 2024-05-30 LAB
ALBUMIN SERPL BCG-MCNC: 4.7 G/DL (ref 3.5–5.2)
ALP SERPL-CCNC: 58 U/L (ref 40–150)
ALT SERPL W P-5'-P-CCNC: 13 U/L (ref 0–50)
ANION GAP SERPL CALCULATED.3IONS-SCNC: 10 MMOL/L (ref 7–15)
AST SERPL W P-5'-P-CCNC: 18 U/L (ref 0–45)
BILIRUB SERPL-MCNC: 0.3 MG/DL
BUN SERPL-MCNC: 9.9 MG/DL (ref 6–20)
CALCIUM SERPL-MCNC: 10.3 MG/DL (ref 8.6–10)
CHLORIDE SERPL-SCNC: 105 MMOL/L (ref 98–107)
CREAT SERPL-MCNC: 0.74 MG/DL (ref 0.51–0.95)
DEPRECATED HCO3 PLAS-SCNC: 26 MMOL/L (ref 22–29)
EGFRCR SERPLBLD CKD-EPI 2021: >90 ML/MIN/1.73M2
FERRITIN SERPL-MCNC: 19 NG/ML (ref 6–175)
GLUCOSE SERPL-MCNC: 91 MG/DL (ref 70–99)
POTASSIUM SERPL-SCNC: 4.1 MMOL/L (ref 3.4–5.3)
PREALB SERPL-MCNC: 23.3 MG/DL (ref 20–40)
PROT SERPL-MCNC: 6.8 G/DL (ref 6.4–8.3)
SODIUM SERPL-SCNC: 141 MMOL/L (ref 135–145)
VIT B12 SERPL-MCNC: 305 PG/ML (ref 232–1245)

## 2024-05-30 NOTE — TELEPHONE ENCOUNTER
Patient brought a form to office visit: request for medical opinion, [facility] of human services    Form completed.  Please fax to circled fax number on page 1.  Scan copy to chart.  Update patient on [employer] once faxed in

## 2024-05-30 NOTE — RESULT ENCOUNTER NOTE
Jessica,  Your blood count panel was normal.  There is no anemia present.  I still waiting on the other labs for iron, B12, protein and kidney/liver function tests.  Kind regards,  Liberty Alvarez MD

## 2024-06-03 NOTE — RESULT ENCOUNTER NOTE
Jessica,    I would echo Dr. Bright villatoro your labs look good except for a very slightly elevated calcium level.  Make sure to not take more than 1000 international units of vitamin D per day.  Also limit calcium about 1200 mg/day.  Plan to recheck your calcium level sometime in the next year to ensure it is normalizing.    Kind regards,  Liberty Alvarez MD

## 2024-06-05 DIAGNOSIS — G47.419 NARCOLEPSY WITHOUT CATAPLEXY(347.00): Primary | ICD-10-CM

## 2024-06-05 RX ORDER — SODIUM OXYBATE 0.5 G/ML
SOLUTION ORAL
Qty: 480 ML | Refills: 5 | Status: SHIPPED | OUTPATIENT
Start: 2024-06-05

## 2024-06-12 ASSESSMENT — SLEEP AND FATIGUE QUESTIONNAIRES
HOW LIKELY ARE YOU TO NOD OFF OR FALL ASLEEP WHILE SITTING INACTIVE IN A PUBLIC PLACE: MODERATE CHANCE OF DOZING
HOW LIKELY ARE YOU TO NOD OFF OR FALL ASLEEP WHILE SITTING AND TALKING TO SOMEONE: SLIGHT CHANCE OF DOZING
HOW LIKELY ARE YOU TO NOD OFF OR FALL ASLEEP WHEN YOU ARE A PASSENGER IN A CAR FOR AN HOUR WITHOUT A BREAK: SLIGHT CHANCE OF DOZING
HOW LIKELY ARE YOU TO NOD OFF OR FALL ASLEEP IN A CAR, WHILE STOPPED FOR A FEW MINUTES IN TRAFFIC: WOULD NEVER DOZE
HOW LIKELY ARE YOU TO NOD OFF OR FALL ASLEEP WHILE SITTING AND READING: SLIGHT CHANCE OF DOZING
HOW LIKELY ARE YOU TO NOD OFF OR FALL ASLEEP WHILE LYING DOWN TO REST IN THE AFTERNOON WHEN CIRCUMSTANCES PERMIT: HIGH CHANCE OF DOZING
HOW LIKELY ARE YOU TO NOD OFF OR FALL ASLEEP WHILE SITTING QUIETLY AFTER LUNCH WITHOUT ALCOHOL: MODERATE CHANCE OF DOZING
HOW LIKELY ARE YOU TO NOD OFF OR FALL ASLEEP WHILE WATCHING TV: MODERATE CHANCE OF DOZING

## 2024-06-13 NOTE — PROGRESS NOTES
"Virtual Visit Details    Type of service:  Video Visit   Video Start Time: 10:32 AM  Video End Time:11:09 AM    Originating Location (pt. Location): Home    Distant Location (provider location):  On-site  Platform used for Video Visit: Feliberto   Switched to phone visit. Her internet was poor and did not support video or audio reliably.         Sleep Follow-Up Visit:    Date on this visit: 6/14/2024    Jessica Mello comes in today for follow-up of her management of narcolepsy without cataplexy.      Per recent clinic note visit with Dr. Pranay Edward, \"Pertinent work up included 2-week actigraphy reported sufficient sleep with 8 hours of mean TST. PSG showed sleep latency of 2.6 minutes and REM latency of 132 minutes total sleep time of 437 minutes. AHI of 0.1/hour. MSLT with 5 nap opportunities showed 2 SOREMPs, but mean sleep latency was 12.3 minutes (20 min, 7 min, 6 min, 17.5 min, 0.5 min, and 10.5 min), though results are inconsistent when the table was compared to actual report with impression. Urine tox was not obtained. She was on fluoxetine during the testing.\"     She feels her depression started after the sleepiness. She feels it is secondary to being tired all of the time.            Patient is not keen on adderall immediate release (previously on immediate release 15 mg a day, switched to XR formula due to shortage) as it caused excessive jitteriness, tenseness, and discomfort. She felt her brain would stop working and she would get obsessed about little details at work. It made her less productive.   She has previously been on Xyrem for about 4 months and felt that it did improve her symptoms. Dose had been slowly increased to 9g. She was able to get 3-4 hours of sleep per dose and would wake up feeling relatively refreshed.   Per prior notes, she has \"tried anti-histamine, melatonin, bupropion, fluoxetine, modafinil, and methylphenidate (side effects). She had serious emotional effects from " "bupropion and modafinil.\"   She has tried trazodone, zolpidem, zaleplon, suvorexant, clonidine, mirtazapine. They all made her groggy but not sleep better.        She took FMLA and then resigned due to treatment for her sleepiness not being effective. The [employer] was not filled and she was not very excited about using that anyway (concerned about side effects).   She reports responding best to Xyrem in the past, 4.5 g twice nightly.   She takes hydroxyzine occasionally, more for anxiety than sleep now. It causes some hangover grogginess.       As of Today:  She is on Adderall 10 mg tabs 1/2 tab (5 mg) 4 times daily and Xyrem 4g twice nightly.    Sleep has been hit or miss. She has a lot going on. She is moving in with her grandfather. She feels she does not have the energy to move. She is trying to keep a good routine. She estimates she gets 2-4 hours of sleep per dose of Xyrem. Prior to moving, her insurance denied long term disability. Her  are working on the appeal. She has not had income and has really been struggling.     Bedtime 10:30 PM. May wake 1:30 or 2 AM. Has an alarm at 2:30 AM for her second dose. Wakes 4-5:30 AM. She feels if she really extends herself one day, she may sleep better, but is more tired the next day.   Naps: rarely. If she only gets about 5 hours of sleep. It can be hard to nap sometimes. She may lay and rest. She often starts head nodding around 9 PM, especially when busy with moving. She gets fatigued around 6-7 PM and will watch TV after that point, even if she tries to pace herself in the day.       She discontinued duloxetine in February and had manic episodes in the months after that. She feels medications have really \"done a number\" on her. She has not really been able to get to a steady state. She has an appointment with a counselor.      She pretty much only takes Adderall for emergencies or if she gets tired while driving. She gets nauseous and her heart races. She tries " to take it with food. She is underweight and is trying to eat. She thinks Xyrem is causing appetite suppression.   She takes hydroxyzine 25 mg only for panic attacks or severe anxiety. It makes her really groggy but she still wakes every couple of hours and is more groggy the next day. If she takes it at night, she will avoid the Xyrem.     Past medical/surgical history, family history, social history, medications and allergies were reviewed.      Problem List:  Patient Active Problem List    Diagnosis Date Noted    Nonallergic rhinitis 07/18/2023     Priority: Medium     Negative skin testing 6/10/23      Abnormal serum iron level 11/15/2022     Priority: Medium     Heme w/u 2022. HH testing neg.  Monitor for developing NINA. If note this would need gi work-up  (Her heme 2023: We had discussed the possibility of an underlying disorder if iron metabolism, including the possibility of masked elevated ferritin levels due to self-phlebotomy with menstruation. Ferritin levels remain low normal at this time, but iron saturation is persistently >50%. I had discussed we could trial phlebotomy for goal <45%, however, my concern is this would worsen her fatigue and could possibly induce headache as well. Will refer to genetics clinic for expanded HHC testing. At this time, she will follow with her PCP and I had discussed she should have once yearly iron studies in addition to CBC, CMP for monitoring. She is encouraged to contact hematology clinic at any time)         Major depression, recurrent (H24) 03/18/2022     Priority: Medium    Narcolepsy without cataplexy(347.00) 01/04/2022     Priority: Medium    Smoker 06/04/2021     Priority: Medium    Other depression 06/04/2021     Priority: Medium    Anxiety 06/04/2021     Priority: Medium    Fatigue, unspecified type 06/04/2021     Priority: Medium        Impression/Plan:    (G47.419) Narcolepsy without cataplexy(347.00)  (primary encounter diagnosis)  Comment: Jessica has  been struggling with insomnia and low daytime energy (although has difficulty falling asleep for naps). She has had very high stress (with [employer] denial, moving). Xyrem is helpful but she still feels she only gets 4-6 hours of sleep with it. She has had appetite suppression from it. BMI is 18. She has had a lot of adverse effects from antidepressants and fired her psychiatrist as a result. She will be seeing a counselor. She has tried almost every stimulant and sleep aid on the market. She is prescribed Adderall 10 mg tabs to take 5 mg as needed. She only really takes it for driving as it causes racing heart rate and nausea. She is trying to keep a consistent sleep schedule.  Plan:  Continue Xyrem 4g twice nightly and Adderall as needed. I do not have much more to offer her.      She will follow up with me in about 1 year(s).     39 minutes were spent on the date of the encounter doing chart review, history and exam, documentation and further activities as noted above.     Bennett Goltz, PA-C    CC: Referred Self

## 2024-06-14 ENCOUNTER — VIRTUAL VISIT (OUTPATIENT)
Dept: SLEEP MEDICINE | Facility: CLINIC | Age: 44
End: 2024-06-14
Payer: MEDICAID

## 2024-06-14 DIAGNOSIS — G47.419 NARCOLEPSY WITHOUT CATAPLEXY(347.00): Primary | ICD-10-CM

## 2024-06-14 PROCEDURE — 99214 OFFICE O/P EST MOD 30 MIN: CPT | Mod: 95 | Performed by: PHYSICIAN ASSISTANT

## 2024-06-20 ENCOUNTER — TELEPHONE (OUTPATIENT)
Dept: FAMILY MEDICINE | Facility: CLINIC | Age: 44
End: 2024-06-20
Payer: MEDICAID

## 2024-06-20 NOTE — LETTER
July 3, 2024      Re: Jessica Mello (:1980)  [address] [place] [id number]        To Whom It May Concern,     I am the primary care physician for Jessica Mello.  I was requested to provide an update of her medical concerns.    Diagnostic impressions: Jessica has been dealing with chronic fatigue thought secondary to narcolepsy and depression.  Symptoms and expected side effects: Excessive fatigue, poor concentration, difficulty sustaining tasks especially of high focused, depressive mood, anxiety, poor sleep.  How often when she need to be absent from work: Given her reported symptoms (fatigue, poor concentration, depressed mood), it is unlikely she can sustain continuous work hours on a regular basis.   Future work restrictions: She has tried multiple medications and seen several different sleep experts and psychiatrist.  She currently does not have good control of her symptoms and if this continues I would not see her working in a sustainable fashion.  However, if she were to find a medication or treatment combination that was effective this certainly could change her ability to work in the future.  Medical evidence: She certainly has showed evidence of depressed mood and anxiety over the years I have known her.  I am unable to assess her ability to sustain long-term concentration in the short appointments we usually have together.  She is typically oriented and has goal-directed speech at our appointments but these are only for limited time.  Given her history, it is not unusual that she could sustain very brief episodes of focus and attention but her ability to sustain this is much less likely.  She would typically write down much of our conversation when we were together due to concerns that she would not be able to remember or follow through on instructions in the future.  Prognosis: Certainly, given current trials and treatments, her prognosis for future improvement is limited.   However, ongoing management with psychiatry, psychology, and sleep providers is recommended and may change future prognosis.    Sincerely,        Liberty Alvarez MD

## 2024-06-20 NOTE — TELEPHONE ENCOUNTER
Staff from [employer] called asking if we received records requests. Nothing noted in chart. Asked to have it refaxed. Received and faxed to HIM .     [employer] - phone [phone] and fax [phone]

## 2024-06-25 NOTE — TELEPHONE ENCOUNTER
Forms/Letter Request    Type of form/letter: OTHER: [employer]       Do we have the form/letter: Yes: will place on provider's desk    Where did/will the form come from? form was faxed in      How would you like the form/letter returned: Fax : [phone]

## 2024-06-28 ENCOUNTER — MYC MEDICAL ADVICE (OUTPATIENT)
Dept: SLEEP MEDICINE | Facility: CLINIC | Age: 44
End: 2024-06-28
Payer: MEDICAID

## 2024-06-28 DIAGNOSIS — G47.419 NARCOLEPSY WITHOUT CATAPLEXY(347.00): Primary | ICD-10-CM

## 2024-06-28 NOTE — LETTER
[facility]  06/28/24  Patient: Jessica Mello  YOB: 1980  Medical Record Number: [id number]                                                                                  Non-Opioid Controlled Substance Agreement    This is an agreement between you and your provider regarding safe and appropriate use of controlled substances prescribed by your care team. Controlled substances are?medicines that can cause physical and mental dependence (abuse).     There are strict laws about having and using these medicines. We here at [facility] are  committed to working with you in your efforts to get better. To support you in this work, we'll help you schedule regular office appointments for medicine refills. If we must cancel or change your appointment for any reason, we'll make sure you have enough medicine to last until your next appointment.     As a Provider, I will:   Listen carefully to your concerns while treating you with respect.   Recommend a treatment plan that I believe is in your best interest and may involve therapies other than medicine.    Talk with you often about the possible benefits and the risk of harm of any medicine that we prescribe for you.  Assess the safety of this medicine and check how well it works.    Provide a plan on how to taper (discontinue or go off) using this medicine if the decision is made to stop its use.      ::  As a Patient, I understand controlled substances:     Are prescribed by my care provider to help me function or work and manage my condition(s).?  Are strong medicines and can cause serious side effects.     Need to be taken exactly as prescribed.?Combining controlled substances with certain medicines or chemicals (such as illegal drugs, alcohol, sedatives, sleeping pills, and benzodiazepines) can be dangerous or even fatal.? If I stop taking my medicines suddenly, I may have severe withdrawal symptoms.     The risks,  benefits, and side effects of these medicine(s) were explained to me. I agree that:    I will take part in other treatments as advised by my care team. This may be psychiatry or counseling, physical therapy, behavioral therapy, group treatment or a referral to specialist.    I will keep all my appointments and understand this is part of the monitoring of controlled substances.?My care team may require an office visit for EVERY controlled substance refill. If I miss appointments or don t follow instructions, my care team may stop my medicine    I will take my medicines as prescribed. I will not change the dose or schedule unless my care team tells me to. There will be no refills if I run out early.      I may be asked to come to the clinic and complete a urine drug test or complete a pill count. If I don t give a urine sample or participate in a pill count, the care team may stop my medicine.    I will only receive controlled substance prescriptions from this clinic. If I am treated by another provider, I will tell them that I am taking controlled substances and that I have a treatment agreement with this provider. I will inform my [facility] care team within one business day if I am given a prescription for any controlled substance by another healthcare provider. My [facility] care team can contact other providers and pharmacists about my use of any medicines.    It is up to me to make sure that I don't run out of my medicines on weekends or holidays.?If my care team is willing to refill my prescription without a visit, I must request refills only during office hours. Refills may take up to 3 business days to process. I will use one pharmacy to fill all my controlled substance prescriptions. I will notify the clinic about any changes to my insurance or medicine availability.    I am responsible for my prescriptions. If the medicine/prescription is lost, stolen or destroyed, it will not be replaced.?I  also agree not to share controlled substance medicines with anyone.     I am aware I should not use any illegal or recreational drugs. I agree not to drink alcohol unless my care team says I can.     If I enroll in the [place] Medical Cannabis program, I will tell my care team before my next refill.    I will tell my care team right away if I become pregnant, have a new medical problem treated outside of my regular clinic, or have a change in my medicines.     I understand that this medicine can affect my thinking, judgment and reaction time.? Alcohol and drugs affect the brain and body, which can affect the safety of my driving. Being under the influence of alcohol or drugs can affect my decision-making, behaviors, personal safety and the safety of others. Driving while impaired (DWI) can occur if a person is driving, operating or in physical control of a car, motorcycle, boat, snowmobile, ATV, motorbike, off-road vehicle or any other motor vehicle ([place] Statute 169A.20). I understand the risk if I choose to drive or operate any vehicle or machinery.    I understand that if I do not follow any of the conditions above, my prescriptions or treatment may be stopped or changed.   I agree that my provider, clinic care team and pharmacy may work with any city, state or federal law enforcement agency that investigates the misuse, sale or other diversion of my controlled medicine. I will allow my provider to discuss my care with, or share a copy of, this agreement with any other treating provider, pharmacy or emergency room where I receive care.     I have read this agreement and have asked questions about anything I did not understand.    ________________________________________________________  Patient Signature - Jessica Bellsacobasilio     ___________________                   Date     ________________________________________________________  Provider Signature - Bennett Ezra Goltz, PA-C, PA-C       ___________________                    Date     ________________________________________________________  Witness Signature (required if provider not present while patient signing)          ___________________                   Date

## 2024-07-08 ENCOUNTER — LAB (OUTPATIENT)
Dept: LAB | Facility: OTHER | Age: 44
End: 2024-07-08
Payer: MEDICAID

## 2024-07-08 DIAGNOSIS — G47.419 NARCOLEPSY WITHOUT CATAPLEXY(347.00): ICD-10-CM

## 2024-07-08 LAB
AMPHETAMINES UR QL SCN: NORMAL
BARBITURATES UR QL SCN: NORMAL
BENZODIAZ UR QL SCN: NORMAL
BZE UR QL SCN: NORMAL
CANNABINOIDS UR QL SCN: NORMAL
FENTANYL UR QL: NORMAL
OPIATES UR QL SCN: NORMAL
PCP QUAL URINE (ROCHE): NORMAL

## 2024-07-08 PROCEDURE — 80307 DRUG TEST PRSMV CHEM ANLYZR: CPT | Mod: ZL | Performed by: FAMILY MEDICINE

## 2024-07-11 ENCOUNTER — CARE COORDINATION (OUTPATIENT)
Dept: SLEEP MEDICINE | Facility: CLINIC | Age: 44
End: 2024-07-11
Payer: MEDICAID

## 2024-07-12 ENCOUNTER — MEDICAL CORRESPONDENCE (OUTPATIENT)
Dept: HEALTH INFORMATION MANAGEMENT | Facility: CLINIC | Age: 44
End: 2024-07-12
Payer: MEDICAID

## 2024-07-28 NOTE — PROGRESS NOTES
"Jessica Mello is a 41 year old female who is being evaluated via a billable video visit.       The patient has been notified of following:      \"This video visit will be conducted via a call between you and your physician/provider. We have found that certain health care needs can be provided without the need for an in-person physical exam.  This service lets us provide the care you need with a video conversation.  If a prescription is necessary we can send it directly to your pharmacy.  If lab work is needed we can place an order for that and you can then stop by our lab to have the test done at a later time.     Video visits are billed at different rates depending on your insurance coverage.  Please reach out to your insurance provider with any questions.     If during the course of the call the physician/provider feels a video visit is not appropriate, you will not be charged for this service.\"     Patient has given verbal consent for Video visit? Yes  How would you like to obtain your AVS? Mail a copy  If you are dropped from the video visit, the video invite should be resent to: Text to cell phone: [phone]  Will anyone else be joining your video visit? No  If patient encounters technical issues they should call [phone]      Video-Visit Details     Type of service:  Video Visit     Video Start Time: 3:35pm  Video End Time: 4pm    Originating Location (pt. Location): Home     Distant Location (provider location):  [facility]      Platform used for Video Visit: [employer]    Virtual visit for presumed narcolepsy without cataplexy.     Assessment / Plan:    1.)  Narcolepsy without cataplexy  - Diagnostic challenges of patient falling asleep before first nap and needed to be awoken to start nap, and urine toxicology was ordered but not collected.   - If we exclude the first nap, mean sleep latency is < 8 minutes and 2 SOREM's, this combined with clinical history " consistent with organic hypersomnia, my final diagnosis is narcolepsy without cataplexy.  - Symptoms of hypersomnia, sleep fragmentation.  - Co-morbid MDD, no psychotic features, no prior suicidality or suicide attempts.  -Side effects from modafinil and methylphenidate including shakiness, palpitations, loss of appetite    We reviewed the current previous treatments for narcolepsy without cataplexy.  Given the previous side effects from modafinil and methylphenidate, including the significant amount of sleep fragmentation, we agreed to proceed with the start of sodium oxybate (Xyrem) with twice nightly dosing.  We will start with 2.25 g dose and plan to increase by 1.275 g per dose every 2 weeks possibly longer.      SUBJECTIVE:  Jessica Mello is a 41 year old female.    Pertinent PMHx of cigarette smoking, MDD, anxiety, fatigue / hypersomnia.     Prior sleep testing:    HST on 8/17/2021 with analysis time 489.5 minutes, AHI 1.7, baseline SpO2 96%, erica SpO2 79%, <= 88% of 0.2 minutes.    Actigraphy 12/5 - 12/21/2021 with average sleep time 8 hours, mild delays on weekends, fairly regular pattern    PSG on 12/21/2021 with weight 108 lbs, BMI 19.1.  AHI 0.1.  PLM index 0.1.  Increased N2, decreased N3, supine REM observed.    MSLT 12/22/2021 with MSL 10.5 minutes with 2 SOREM's on 5 naps.  MSL of 7.75 minutes if first nap excluded, noted that patient had fallen asleep prior to this nap and had to be awoken to start first nap.     Most recent visit with Dr. Hewitt on 9/9/2021 to review sleep testing results.  Primary concern of hypersomnia.  No reported benefit from trial of anti-depressant.  Recommended to proceed with actigraphy, PSG, MSLT.     Reviewed recent visit with her primary physician, Dr. Alvarez on 10/7/2021.  Reported was upset about urine toxicology being ordered at last visit with sleep clinic.  Thorough lab work-up negative to date, plan for referral to psychiatry and follow-up with our  sleep clinic.  Referral to rheumatology given elevated KEIRY with nucleolar staining pattern.     Normal lab results for: CMP, albumin, pre-albumin, bilirubin direct, CRP, HDL, LDL, triglycerides, TSH, vit B12, bit D, CBC, ESR, lyme Ab.  Low normal ferritin at 20.  KEIRY returned positive (1:320) with nucleolar pattern.  Did not see results for urine toxicology.     10/19/2021 -we reviewed her interim history.  As noted above, in general thorough blood work has been negative outside of a positive KEIRY with a nucleolar pattern.  She is scheduled to see rheumatology in January.  She is at her sleepiness started [id number], and while initially was more mild fatigue feels like it is progressed now to having significant daytime exhaustion and inappropriate sleepiness.  When allowed to sleep ad caitlyn., she was she could sleep 10 or more hours.     She has moved up to [place] to help her grandfather after the recent passing of her grandmother.  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     Most nights in bed around 9:30 PM, will sleep quickly.  She may have 1-2 awakenings, these may be as long as 30-60 minutes.  She can sleep potentially as late as 8:30 AM, but says may wake up up 5-6 AM and had difficulty returning to sleep.  She seems to report taking often 1 or 2 naps daily, usually for 15 minutes.     She does seem to report some sleep paralysis.  But largely denies any hypnagogic or hypnopompic hallucinations, denies cataplexy.     A/P for actigraphy, PSG, MSLT.     1/4/2022 - We reviewed her actigraphy, PSG, MSLT results in detail.  Diagnostic challenges of patient falling asleep before first nap and needed to be awoken to start nap, and urine toxicology was ordered but not collected.  Plan for start of modafinil or methylphenidate.    Today -reviewed her experience headaches with the methylphenidate.  She felt that there is a mild benefit for focus, did not feel it was significantly  benefit for sleepiness.  She also had a significant side effects of shakiness, decreased appetite, palpitations starting at a dose of 10 mg and became quite disruptive at 15mg and higher.  She also continues to have significant sleep disruption, with frequent nighttime awakenings of unclear origin.  She continues to work closely with her psychiatrist in regards to her depression.  No history of suicidality or suicide attempts.    She has had a mild response to restarting hydroxyzine 25-50 mg to be used as needed at bedtime.      Insomnia Severity Index    Difficulty falling asleep 1    Difficulty staying asleep 4    Problems waking up too early 4    How SATISFIED/DISSATISFIED are you with your CURRENT sleep pattern? 4    How NOTICEABLE to others do you think your sleep problem is in terms of impairing the quality of your life? 4    How WORRIED/DISTRESSED are you about your current sleep problem? 4    To what extent do you consider your sleep problem to INTERFERE with your daily functioning (e.g. daytime fatigue, mood, ability to function at work/daily chores, concentration, memory, mood, etc.) CURRENTLY? 4    Total Score 25        Past medical history:    Patient Active Problem List    Diagnosis Date Noted     Narcolepsy without cataplexy(347.00) 01/04/2022     Priority: Medium     Smoker 06/04/2021     Priority: Medium     Other depression 06/04/2021     Priority: Medium     Anxiety 06/04/2021     Priority: Medium     Fatigue, unspecified type 06/04/2021     Priority: Medium       10 point ROS of systems including Constitutional, Eyes, Respiratory, Cardiovascular, Gastroenterology, Genitourinary, Integumentary, Muscularskeletal, Psychiatric were all negative except for pertinent positives noted in my HPI.    Current Outpatient Medications   Medication Sig Dispense Refill     FLUoxetine (PROZAC) 20 MG capsule Take 1 capsule (20 mg) by mouth daily 90 capsule 1     hydrOXYzine (ATARAX) 25 MG tablet Take 1-2 tablets  (25-50 mg) by mouth 3 times daily as needed for other (sleep/anxiety) 20 tablet 1     methylphenidate (RITALIN) 10 MG tablet Take 1/2 - 1 tablet by mouth two to three times daily as needed for sleepiness. 90 tablet 0     VITAMIN D PO Take 2,000 Int'l Units by mouth daily         OBJECTIVE:  LMP 03/10/2022     Physical Exam     ---  This note was written with the assistance of the Dragon voice-dictation technology software. The final document, although reviewed, may contain errors. For corrections, please contact the office.    Gigi Boothe MD    Sleep Medicine  [facility]  ([phone])  [facility]  ([phone])    Time spent on the date of service:  30 minutes.     Home 31-Jul-2024

## 2024-09-03 DIAGNOSIS — R09.81 NASAL CONGESTION: ICD-10-CM

## 2024-09-03 RX ORDER — FLUTICASONE PROPIONATE 50 MCG
SPRAY, SUSPENSION (ML) NASAL
Qty: 16 G | Refills: 1 | Status: SHIPPED | OUTPATIENT
Start: 2024-09-03

## 2024-09-09 ASSESSMENT — PATIENT HEALTH QUESTIONNAIRE - PHQ9
SUM OF ALL RESPONSES TO PHQ QUESTIONS 1-9: 22
10. IF YOU CHECKED OFF ANY PROBLEMS, HOW DIFFICULT HAVE THESE PROBLEMS MADE IT FOR YOU TO DO YOUR WORK, TAKE CARE OF THINGS AT HOME, OR GET ALONG WITH OTHER PEOPLE: EXTREMELY DIFFICULT
SUM OF ALL RESPONSES TO PHQ QUESTIONS 1-9: 22

## 2024-09-09 ASSESSMENT — ANXIETY QUESTIONNAIRES
7. FEELING AFRAID AS IF SOMETHING AWFUL MIGHT HAPPEN: NEARLY EVERY DAY
8. IF YOU CHECKED OFF ANY PROBLEMS, HOW DIFFICULT HAVE THESE MADE IT FOR YOU TO DO YOUR WORK, TAKE CARE OF THINGS AT HOME, OR GET ALONG WITH OTHER PEOPLE?: EXTREMELY DIFFICULT
GAD7 TOTAL SCORE: 20

## 2024-09-10 ENCOUNTER — VIRTUAL VISIT (OUTPATIENT)
Dept: FAMILY MEDICINE | Facility: CLINIC | Age: 44
End: 2024-09-10
Payer: COMMERCIAL

## 2024-09-10 DIAGNOSIS — F41.9 ANXIETY: ICD-10-CM

## 2024-09-10 DIAGNOSIS — F32.1 MODERATE MAJOR DEPRESSION (H): Primary | ICD-10-CM

## 2024-09-10 PROCEDURE — 99214 OFFICE O/P EST MOD 30 MIN: CPT | Mod: 95 | Performed by: FAMILY MEDICINE

## 2024-09-10 PROCEDURE — G2211 COMPLEX E/M VISIT ADD ON: HCPCS | Mod: 95 | Performed by: FAMILY MEDICINE

## 2024-09-10 NOTE — PROGRESS NOTES
Jessica is a 43 year old who is being evaluated via a billable video visit.    How would you like to obtain your AVS? MyChart  If the video visit is dropped, the invitation should be resent by: Send to e-mail at: [email or web address]  Will anyone else be joining your video visit? No      Assessment & Plan     Moderate major depression (H)  Anxiety  Flare of sx's and ready to retrial meds. Situational stressors. Underweight.   Will start sertraline per her request. Reviewed onset of action of meds, common side effects and plan for close f/u. Encouraged call to clinic if symptoms worsening or adverse reactions.   She has follow up with therapy planned this week and will get her connected to our care coordination program to discuss food/housing concerns.   - sertraline (ZOLOFT) 50 MG tablet; Take 0.5 tablets (25 mg) by mouth daily for 14 days, THEN 1 tablet (50 mg) daily.  - Primary Care - Care Coordination Referral; Future    Follow up in 3-4 weeks.             Subjective   Jessica is a 43 year old, presenting for the following health issues:  Recheck Medication (Depression/anxiety)      9/10/2024     8:29 AM   Additional Questions   Roomed by Madisyn   Accompanied by self         9/10/2024     8:29 AM   Patient Reported Additional Medications   Patient reports taking the following new medications no     Video Start Time: 9:36 AM    History of Present Illness       Mental Health Follow-up:  Patient presents to follow-up on Depression & Anxiety.Patient's depression since last visit has been:  Worse  The patient is not having other symptoms associated with depression.  Patient's anxiety since last visit has been:  Worse  The patient is having other symptoms associated with anxiety.  Any significant life events: financial concerns, housing concerns, grief or loss, health concerns and other  Patient is feeling anxious or having panic attacks.  Patient has no concerns about alcohol or drug use.    She eats 2-3  "servings of fruits and vegetables daily.She consumes 0 sweetened beverage(s) daily.She exercises with enough effort to increase her heart rate 20 to 29 minutes per day.  She exercises with enough effort to increase her heart rate 3 or less days per week.   She is taking medications regularly.     Struggling to adjust to staying at someone elses house and having to care for grandfather.   Dont feel well.   \"Bad situation\"  Anxiety with walking dog- loose dogs in neighborhood and worry they will come after them.     Sleep is still sporadict  Sleep 4-6 hours per night  Wake early,  occ can fall back to sleep.   Sleep some during the day.  \"Nothing to do\" and feeling stressed.  Focusing on self care. \"Staying clean\", walking dog. Started jogging few times a week - not sure if that was making her more tired.   Trying to eat regular meals but hard to have good routine as help comes in for her grandfather. Try to get 3 meals per day. Wt stable at 100 lbs (previous baseline was 110-115). Boost protein supplement 0-1 x' a day (hard to get to grocery store) and use mostly if skip meals.  Sx's started with bad anxiety but lately starting to feel really depression sxs on top of the anxiety.   Concerned about winter/time change and not sure how long she can stay there.  No self harm thoughts. No SI.   No substance use.   No abuse.     Several med trials in the past that were unsuccessful  Feels it would be \"safe\" to go with the last med (sertraline) as no issues at first with taking that medication  Mirtazapine trial in past didn't help with sleep. Also caused with wt gain (10-15 lbs in one mo). Worried it will make her too tired. Really didn't like effexor/cymbalta    Working with [facility] who referred her to appt with the [facility] to discuss housing issues.   Initial Therapist with therapist at [facility] wasn't helpful so has meeting with new counselor this thurs ([facility] [employer] through " [employer])    Chronic shoulder pain that is changed and bothering her a lot. Wont get into see someone for a while. Limited use of arm with reaching. No numbness or tingling. Ibuprofen infrequently.           11/14/2023     8:12 AM 5/28/2024    12:49 PM 9/9/2024     9:36 AM   PHQ-9 SCORE   PHQ-9 Total Score MyChart  21 (Severe depression) 22 (Severe depression)   PHQ-9 Total Score 21 21 22         3/6/2023     7:38 PM 5/28/2024    12:55 PM 9/9/2024     9:40 AM   BYRON-7 SCORE   Total Score 19 (severe anxiety) 20 (severe anxiety) 20 (severe anxiety)   Total Score 19 20 20               Objective           Vitals:  No vitals were obtained today due to virtual visit.    Physical Exam   GENERAL: alert and no distress  EYES: Eyes grossly normal to inspection.  No discharge or erythema, or obvious scleral/conjunctival abnormalities.  RESP: No audible wheeze, cough, or visible cyanosis.    SKIN: Visible skin clear. No significant rash, abnormal pigmentation or lesions.  NEURO: Cranial nerves grossly intact.  Mentation and speech appropriate for age.  PSYCH: Appropriate affect, tone, and pace of words          Video-Visit Details    Type of service:  Video Visit   Video End Time:10:00 AM  Originating Location (pt. Location): Home    Distant Location (provider location):  Off-site  Platform used for Video Visit: [employer]  Signed Electronically by: Liberty Alvarez MD

## 2024-09-11 ENCOUNTER — TELEPHONE (OUTPATIENT)
Dept: SLEEP MEDICINE | Facility: CLINIC | Age: 44
End: 2024-09-11
Payer: COMMERCIAL

## 2024-09-11 ENCOUNTER — PATIENT OUTREACH (OUTPATIENT)
Dept: CARE COORDINATION | Facility: CLINIC | Age: 44
End: 2024-09-11
Payer: COMMERCIAL

## 2024-09-11 NOTE — TELEPHONE ENCOUNTER
Prior Authorization Specialty Medication Request    Medication/Dose: Xyrem 500mg/ml  Diagnosis and ICD code (if different than what is on RX):    New/renewal/insurance change PA/secondary ins. PA:  Previously Tried and Failed:      Important Lab Values:   Rationale:     Insurance   Primary: [employer] [place]  Insurance ID:  [id number]    Secondary (if applicable):  Insurance ID:      Pharmacy Information (if different than what is on RX)  [facility]:  [facility]   Phone: [phone]   Fax: [phone]

## 2024-09-11 NOTE — PROGRESS NOTES
Clinic Care Coordination Contact  Community Health Worker Initial Outreach    CHW Initial Information Gathering:  Referral Source: PCP  Preferred Urgent Care: Other ([facility])  Current living arrangement:: I live in a private home with family  Type of residence:: Private home - no stairs  Community Resources: Financial/Insurance, County Programs  Supplies Currently Used at Home: None  Equipment Currently Used at Home: none  Informal Support system:: None  No PCP office visit in Past Year: No  Transportation means:: Regular car       Patient accepts CC: Yes. Patient scheduled for assessment with the SW on 9/16/24 at 1:00 pm. Patient noted desire to discuss supports with finances, housing, and mental health supports.     JOHN RubioW  [phone]  Connected Care Resource [facility]

## 2024-09-13 NOTE — TELEPHONE ENCOUNTER
Central Prior Authorization Team   Phone: [phone]    PA Initiation    Medication: Xyrem 500mg/ml  Insurance Company: [employer] [facility] - Phone [phone] Fax [phone]  Pharmacy Filling the Rx: [facility] - [place], [place] - [address] [place] [address]  Filling Pharmacy Phone: [phone]  Filling Pharmacy Fax:    Start Date: 9/13/2024

## 2024-09-16 ENCOUNTER — APPOINTMENT (OUTPATIENT)
Dept: NURSING | Facility: CLINIC | Age: 44
End: 2024-09-16
Payer: COMMERCIAL

## 2024-09-16 ENCOUNTER — PATIENT OUTREACH (OUTPATIENT)
Dept: CARE COORDINATION | Facility: CLINIC | Age: 44
End: 2024-09-16

## 2024-09-16 ASSESSMENT — ACTIVITIES OF DAILY LIVING (ADL): DEPENDENT_IADLS:: INDEPENDENT

## 2024-09-16 NOTE — PROGRESS NOTES
Clinic Care Coordination Contact  Clinic Care Coordination Contact  OUTREACH    Referral Information:  Referral Source: PCP    Primary Diagnosis: Psychosocial    Chief Complaint   Patient presents with    Clinic Care Coordination - Initial     SW CC        [place] Utilization:   Clinic Utilization  No PCP office visit in Past Year: No  Utilization      No Show Count (past year)  0             ED Visits  0             Hospital Admissions  0                    Current as of: 9/16/2024 12:22 PM                Clinical Concerns:  Current Medical Concerns:    Patient Active Problem List   Diagnosis    Smoker    Other depression    Anxiety    Fatigue, unspecified type    Narcolepsy without cataplexy(347.00)    Major depression, recurrent (H24)    Abnormal serum iron level    Nonallergic rhinitis       Current Behavioral Concerns: Depression and Anxiety    Education Provided to patient: Role of SW CC and clinic care coordination, Medical transportation and food resources.      SW CC outreached to pt for initial assessment per [facility] scheduling. SW introduced self. Pt express she recently connected with a counselor about a week ago. Pt plans to continue to meet with counselor for on going supports. Pt currently has food/cash/insurance benefits from the [facility]. Pt applied for emergency assistance, but was denied because she is currently not working. JESUS encourage pt to reapply once she receives some income.    Pt is awaiting her appeal for disability insurance through her work. Pt has a car, but is afraid she will not be able to make payments on it because she is going through her savings rather fast. SW to share medical transportation through [place] with pt. Pt currently uses the virtual visits to avoid having to drive to see provider if not necessary.     Pt report she currently lives with her grandfather, who has dementia. Pt's grandfather has a caregiver 5 days per week. Pt informally helps her grandfather when care giver is  not present. Family decide they will move her grandfather to a [facility] memory care- facility by November. Pt is unsure that she would do by then if she can't find housing. Pt mentioned she called 211 and was connected with [facility] resources. Pt has an appointment on 10/1/24 with [facility]  to discuss housing options. SW recommend for pt to keep that appointment and ask [facility] worker for additional [facility] resources if available.        Health Maintenance Reviewed: Due/Overdue   Health Maintenance Due   Topic Date Due    DEPRESSION ACTION PLAN  Never done    MAMMO SCREENING  Never done    HEPATITIS B IMMUNIZATION (1 of 3 - 19+ 3-dose series) Never done    PAP  Never done    INFLUENZA VACCINE (1) 09/01/2024    COVID-19 Vaccine (3 - 2023-24 season) 09/01/2024    YEARLY PREVENTIVE VISIT  10/02/2024       Clinical Pathway: None    Medication Management:  Medication review status: Medication recently reviewed by MD on 9/10/24    Functional Status:  Dependent ADLs:: Independent  Dependent IADLs:: Independent  Mobility Status: Independent    Living Situation:  Current living arrangement:: I live in a private home with family  Type of residence:: Private home - no stairs    Lifestyle & Psychosocial Needs:    Social Determinants of Health     Food Insecurity: Low Risk  (9/25/2023)    Food Insecurity     Within the past 12 months, did you worry that your food would run out before you got money to buy more?: No     Within the past 12 months, did the food you bought just not last and you didn t have money to get more?: No   Depression: At risk (9/10/2024)    PHQ-2     PHQ-2 Score: 6   Housing Stability: Low Risk  (9/25/2023)    Housing Stability     Do you have housing? : Yes     Are you worried about losing your housing?: No   Tobacco Use: Medium Risk (9/10/2024)    Patient History     Smoking Tobacco Use: Former     Smokeless Tobacco Use: Never     Passive Exposure: Past   Financial Resource Strain: Low Risk  (9/25/2023)     Financial Resource Strain     Within the past 12 months, have you or your family members you live with been unable to get utilities (heat, electricity) when it was really needed?: No   Alcohol Use: Not At Risk (7/31/2022)    [facility]    AUDIT-C     Frequency of Alcohol Consumption: 2-4 times a month     Average Number of Drinks: 1 or 2     Frequency of Binge Drinking: Never   Transportation Needs: Low Risk  (9/25/2023)    Transportation Needs     Within the past 12 months, has lack of transportation kept you from medical appointments, getting your medicines, non-medical meetings or appointments, work, or from getting things that you need?: No   Physical Activity: Inactive (5/30/2023)    [facility]    Exercise Vital Sign     Days of Exercise per Week: 0 days     Minutes of Exercise per Session: 0 min   Interpersonal Safety: Low Risk  (5/29/2024)    Interpersonal Safety     Do you feel physically and emotionally safe where you currently live?: Yes     Within the past 12 months, have you been hit, slapped, kicked or otherwise physically hurt by someone?: No     Within the past 12 months, have you been humiliated or emotionally abused in other ways by your partner or ex-partner?: No   Stress: Stress Concern Present (3/8/2022)    [employer] [place] [employer] Occupational [employer] - Occupational Stress Questionnaire     Feeling of Stress : Very much   Social Connections: Socially Isolated (3/8/2022)    Social Connection and Isolation Panel [NHANES]     Frequency of Communication with Friends and Family: Once a week     Frequency of Social Gatherings with Friends and Family: Once a week     Attends Mormonism Services: Never     Active Member of Clubs or Organizations: Yes     Attends Club or Organization Meetings: Not on file     Marital Status: Never    Health Literacy: Not on file        Transportation means:: Regular car, Medical transport     Informal  Support system:: None      Resources and Interventions:  Current Resources:      Community Resources: Financial/Insurance, County Programs  Supplies Currently Used at Home: None  Equipment Currently Used at Home: none          Referrals Placed: Community Resources       Care Plan: N/A    Patient/Caregiver understanding: Pt reports understanding and denies any additional questions or concerns at this time.           Future Appointments                In 3 weeks Liberty Alvarez MD [facility]            Plan: Patient was provided with  CC contact information and encouraged to call with any questions or concerns. Pt declined needing CC at this time.  CC to send resources to pt via [employer]. No further outreaches will be made at this time unless a new referral is made or a change in the pt's status occurs.    Resources:  Financial supports- rent/car payment  30 days foundation - check out the salvador section for applying for help offers up to $500 and depends on availability of funds - not available for multiple use by one person  [email or web address]   Click on the Request a salvador to apply   Request a [place] - The 30-Days Foundation     Emergency Assistance  Emergency Assistance Programs offer short-term support for people who need financial assistance in a crisis situation such as a natural disaster, utility shut off, or eviction. There are two types of assistance:    https://apply.mn.[facility].mn.gov/  Apply[place] is a simple, secure Web application that connects you with state and [facility] services to help meet your and your family's basic needs. You can use Apply[place] to apply for: cash assistance,  assistance,  Emergency help gives aid to a family with an emergency such as an eviction or loss from a fire.     Transportation:   [facility] (Ph: [phone]) call to enroll. Rides will need to be set up 24-48 hours prior to medical appointment.     Blue Ride-  [phone] (M-F 7am-5pm)   Call 24-48 hours in advanced to schedule medical ride    211  [email or web address]       [phone]      NAYANA Oseguera  Social Work Primary Care Clinic Care Coordinator  [facility]  [phone]  [email or web address]

## 2024-09-16 NOTE — TELEPHONE ENCOUNTER
Prior Authorization Approval    Authorization Effective Date: 9/1/2024  Authorization Expiration Date: 9/14/2025  Medication: Xyrem 500mg/ml  Approved Dose/Quantity:   Reference #:     Insurance Company: [employer] - Phone [phone] Fax [phone]  Expected CoPay:       CoPay Card Available:      Foundation Assistance Needed:    Which Pharmacy is filling the prescription (Not needed for infusion/clinic administered): [facility] - [facility], [place] - [address] [place] [address]  Pharmacy Notified:  yes  Patient Notified:  yes- Pharmacy will contact patient when ready to /ship

## 2024-09-16 NOTE — LETTER
[facility] CARE COORDINATION  [address]  [place] [address]    September 16, 2024    Jessica Mello  [address]  [place] [address]      Dear Jessica,    I am a clinic care coordinator who works with Liberty Alvarez MD with the [facility]. I wanted to thank you for spending the time to talk with me. You expressed not to be enrolled into CC, but would like resources. Below is a description of clinic care coordination if you change your mind feel free to give me call.      The clinic care coordination team is made up of a registered nurse, , financial resource worker and community health worker who understand the health care system. The goal of clinic care coordination is to help you manage your health and improve access to the health care system. Our team works alongside your provider to assist you in determining your health and social needs. We can help you obtain health care and community resources, providing you with necessary information and education. We can work with you through any barriers and develop a care plan that helps coordinate and strengthen the communication between you and your care team.  Our services are voluntary and are offered without charge to you personally. Below are some the resources we discussed.    Resources:  Financial supports- rent/car payment  30 days foundation - check out the mamie section for applying for help offers up to $500 and depends on availability of funds - not available for multiple use by one person  [email or web address]   Click on the Request a mamie to apply   Request a Mamie - The 30-Days Foundation     Emergency Assistance  Emergency Assistance Programs offer short-term support for people who need financial assistance in a crisis situation such as a natural disaster, utility shut off, or eviction. There are two types of assistance:    https://apply.mn.dhs.mn.gov/  Apply[place] is a  simple, secure Web application that connects you with state and [facility] services to help meet your and your family's basic needs. You can use ApplyMN to apply for: cash assistance,  assistance,  Emergency help gives aid to a family with an emergency such as an eviction or loss from a fire.     Transportation:   [facility] (Ph: [phone]) call to enroll. Rides will need to be set up 24-48 hours prior to medical appointment.     Blue Ride- [phone] (M-F 7am-5pm)   Call 24-48 hours in advanced to schedule medical ride    211  [email or web address]       [phone]    Please feel free to contact me with any questions or concerns regarding care coordination and what we can offer.      We are focused on providing you with the highest-quality healthcare experience possible.    Sincerely,     NAYANA Oseguera  Social Work Primary Care Clinic Care Coordinator  [facility]  [phone]  [email or web address]

## 2024-09-18 ENCOUNTER — TELEPHONE (OUTPATIENT)
Dept: FAMILY MEDICINE | Facility: CLINIC | Age: 44
End: 2024-09-18
Payer: COMMERCIAL

## 2024-09-18 NOTE — TELEPHONE ENCOUNTER
Forms/Letter Request    Type of form/letter: Medical opinion     Do we have the form/letter: YES- Placed on provider's desk    Who is the form from? [employer]    Where did/will the form come from? form was faxed in    How would you like the form/letter returned: Fax : [phone]

## 2024-10-02 NOTE — TELEPHONE ENCOUNTER
"Felipe from [employer] is calling to check on the status of the form.    Per chart review, nothing found.    \"On 9/25, we were told that the form was incomplete.\"    Please review and advise when able.  Chaya Oviedo RN      "

## 2024-10-07 ASSESSMENT — ANXIETY QUESTIONNAIRES
8. IF YOU CHECKED OFF ANY PROBLEMS, HOW DIFFICULT HAVE THESE MADE IT FOR YOU TO DO YOUR WORK, TAKE CARE OF THINGS AT HOME, OR GET ALONG WITH OTHER PEOPLE?: VERY DIFFICULT
7. FEELING AFRAID AS IF SOMETHING AWFUL MIGHT HAPPEN: NEARLY EVERY DAY
7. FEELING AFRAID AS IF SOMETHING AWFUL MIGHT HAPPEN: NEARLY EVERY DAY
GAD7 TOTAL SCORE: 14
6. BECOMING EASILY ANNOYED OR IRRITABLE: SEVERAL DAYS
5. BEING SO RESTLESS THAT IT IS HARD TO SIT STILL: MORE THAN HALF THE DAYS
GAD7 TOTAL SCORE: 14
1. FEELING NERVOUS, ANXIOUS, OR ON EDGE: MORE THAN HALF THE DAYS
3. WORRYING TOO MUCH ABOUT DIFFERENT THINGS: MORE THAN HALF THE DAYS
2. NOT BEING ABLE TO STOP OR CONTROL WORRYING: MORE THAN HALF THE DAYS
4. TROUBLE RELAXING: MORE THAN HALF THE DAYS
IF YOU CHECKED OFF ANY PROBLEMS ON THIS QUESTIONNAIRE, HOW DIFFICULT HAVE THESE PROBLEMS MADE IT FOR YOU TO DO YOUR WORK, TAKE CARE OF THINGS AT HOME, OR GET ALONG WITH OTHER PEOPLE: VERY DIFFICULT
GAD7 TOTAL SCORE: 14

## 2024-10-08 ENCOUNTER — VIRTUAL VISIT (OUTPATIENT)
Dept: FAMILY MEDICINE | Facility: CLINIC | Age: 44
End: 2024-10-08
Payer: COMMERCIAL

## 2024-10-08 DIAGNOSIS — F41.9 ANXIETY: ICD-10-CM

## 2024-10-08 DIAGNOSIS — G47.419 NARCOLEPSY WITHOUT CATAPLEXY(347.00): ICD-10-CM

## 2024-10-08 DIAGNOSIS — F32.1 MODERATE MAJOR DEPRESSION (H): Primary | ICD-10-CM

## 2024-10-08 PROCEDURE — 99442 PR PHYSICIAN TELEPHONE EVALUATION 11-20 MIN: CPT | Mod: 93 | Performed by: FAMILY MEDICINE

## 2024-10-08 RX ORDER — DEXTROAMPHETAMINE SACCHARATE, AMPHETAMINE ASPARTATE, DEXTROAMPHETAMINE SULFATE AND AMPHETAMINE SULFATE 2.5; 2.5; 2.5; 2.5 MG/1; MG/1; MG/1; MG/1
TABLET ORAL
Qty: 60 TABLET | Refills: 0 | Status: CANCELLED | OUTPATIENT
Start: 2024-10-08

## 2024-10-08 RX ORDER — SERTRALINE HYDROCHLORIDE 100 MG/1
100 TABLET, FILM COATED ORAL DAILY
Qty: 30 TABLET | Refills: 1 | Status: SHIPPED | OUTPATIENT
Start: 2024-10-08

## 2024-10-08 ASSESSMENT — PATIENT HEALTH QUESTIONNAIRE - PHQ9: SUM OF ALL RESPONSES TO PHQ QUESTIONS 1-9: 20

## 2024-10-08 NOTE — PROGRESS NOTES
"  If patient has telephone visit, have they been educated on video visit as preferred visit method and offered to change to video visit? yes        Instructions Relayed to Patient by Virtual Roomer:     Patient is active on BRAIN:   Relayed following to patient: \"It looks like you are active on BRAIN, are you able to join the visit this way? If not, do you need us to send you a link now or would you like your provider to send a link via text or email when they are ready to initiate the visit?\"      Patient Confirmed they will join visit via: Provider to call patient for telephone visit   Reminded patient to ensure they were logged on to virtual visit by arrival time listed.   Asked if patient has flexibility to initiate visit sooner than arrival time: patient is unable to initiate visit earlier than arrival time     If pediatric virtual visit, ensured pediatric patient along with parent/guardian will be present for video visit.     Patient offered the website [email or web address] and/or phone number to BRAIN Help line: [phone]    Jessica is a 43 year old who is being evaluated via a billable telephone visit.    What phone number would you like to be contacted at? [phone]  How would you like to obtain your AVS? Alphion  Originating Location (pt. Location): Home    Distant Location (provider location):  On-site    Assessment & Plan     Moderate major depression (H)  Anxiety  Thankfully, has been tolerating sertraline and has noticed some modest benefit from the medication.  Still fairly profound depression and anxiety, fatigue and apathy.  Is willing to trial increasing the dose to 100 mg.  Will follow-up in 6 weeks and as needed  She will continue her work with her therapist who she is seeing weekly  - sertraline (ZOLOFT) 100 MG tablet; Take 1 tablet (100 mg) by mouth daily.    Narcolepsy without cataplexy(347.00)  Follows with sleep clinic.  Continues on maintenance dose of sodium " "oxybate.  Still with profound fatigue and sleep issues.  She will continue to work with therapist for cognitive behavioral insomnia treatment                Jett Lopez is a 43 year old, presenting for the following health issues:  Follow Up (Depression & anxiety )      10/8/2024     9:15 AM   Additional Questions   Roomed by Phuong MENDOSA   Accompanied by Self         10/8/2024     9:15 AM   Patient Reported Additional Medications   Patient reports taking the following new medications None     History of Present Illness       Mental Health Follow-up:  Patient presents to follow-up on Depression & Anxiety.Patient's depression since last visit has been:  Medium  The patient is not having other symptoms associated with depression.  Patient's anxiety since last visit has been:  Medium  The patient is not having other symptoms associated with anxiety.  Any significant life events: job concerns, financial concerns, housing concerns, health concerns and other  Patient is feeling anxious or having panic attacks.  Patient has no concerns about alcohol or drug use.    She eats 2-3 servings of fruits and vegetables daily.She consumes 0 sweetened beverage(s) daily.She exercises with enough effort to increase her heart rate 9 or less minutes per day.  She exercises with enough effort to increase her heart rate 3 or less days per week.   She is taking medications regularly.       Started sertraline last visit. 25 mg x 14 days and then up to 50 mg   Helping a little   No longer having panic  Not felt quite so low. Less crying, less sad.   No significant AE, ? Maybe some headache but minimal.   Nothing worsened since last check  Still living with grandfather. \"Going okay\", home care is gone now and now she is doing all his meals/cleaning. She will be coming back.   No self harm thoughts.   No safety concerns.   Etoh- rare, not smoking.     Havent taken adderall in long time. Has for emergencies.   Doesn't go anywhere so not " "needing.     On maintenance for the sodium oxybate through sleep clinic.last visit June 2023.     Talked to care coordination after last visit. Got list of resources.   On a list for housing.   Started counseling.  Reports going \"okay\". Visit 1 x per day. Will be starting CBTi.                 5/28/2024    12:49 PM 9/9/2024     9:36 AM 10/8/2024     9:12 AM   PHQ-9 SCORE   PHQ-9 Total Score MyChart 21 (Severe depression) 22 (Severe depression)    PHQ-9 Total Score 21 22 20         5/28/2024    12:55 PM 9/9/2024     9:40 AM 10/7/2024    12:02 PM   BYRON-7 SCORE   Total Score 20 (severe anxiety) 20 (severe anxiety) 14 (moderate anxiety)   Total Score 20 20 14               Objective           Vitals:  No vitals were obtained today due to virtual visit.    Physical Exam   General: Alert and no distress //Respiratory: No audible wheeze, cough, or shortness of breath // Psychiatric:   affect is flat, tearful at times, normal pace of words, no unusual thought processes          Phone call duration: 14  minutes  Signed Electronically by: Liberty Alvarez MD    "

## 2024-10-09 ENCOUNTER — TELEPHONE (OUTPATIENT)
Dept: SLEEP MEDICINE | Facility: CLINIC | Age: 44
End: 2024-10-09
Payer: COMMERCIAL

## 2024-10-09 NOTE — TELEPHONE ENCOUNTER
Felipe called and was informed of providers message below. They will follow records request process if needed.  Denise Dahl, CMA

## 2024-10-09 NOTE — TELEPHONE ENCOUNTER
Felipe From [employer] called back trying to reach [facility] care team. Gave [facility] phone number to try them there as that's where Pt was seen by him.

## 2024-10-09 NOTE — TELEPHONE ENCOUNTER
General Call      Reason for Call:  [employer] Felipe called today and states still looking for Peer Review Letter for Disability for patient.    Fax Number:  [phone]  Attention:  Felipe    Please sent today.  Appears may have been send to wrong fax number?  [phone] area code?    Please contact her back.  Thank you.    What are your questions or concerns:  yes    Date of last appointment with provider: na    Could we send this information to you in [employer] or would you prefer to receive a phone call?:   Patient would prefer a phone call   Okay to leave a detailed message?: Yes at Other phone number:  [phone] *

## 2024-10-09 NOTE — CONFIDENTIAL NOTE
Please let the law firm know I will be unable to respond to their requests.   They certainly can request records which will hold my medical opinion.

## 2024-10-14 ENCOUNTER — TELEPHONE (OUTPATIENT)
Dept: FAMILY MEDICINE | Facility: CLINIC | Age: 44
End: 2024-10-14
Payer: COMMERCIAL

## 2024-10-14 NOTE — TELEPHONE ENCOUNTER
Nurse SBAR Triage    Situation: Pt calling with insomnia issues.    Background: Was recently increased to 100mg of Sertraline on 10/08. Pt having issues with insomnia more than before since increase in the Sertraline HCl 100 mg . Has a hx of sleeping/insomnia issues and follows with the sleep clinic.     Assessment: She went from getting approx 6 hrs a night to 4 hrs a night. Pt is currently taking this at 8 am each morning. Pt says she has had issues with this before while taking anti-depression medication.     Recommendation: Routing to PCP to review and advise.    Amairani Goldberg, RN, BSN  [facility]

## 2024-10-14 NOTE — TELEPHONE ENCOUNTER
General Call      Reason for Call:  Pt having issues with insomnia more than before since increase in the Sertraline HCl 100 mg . She went from getting approx 6 hrs a night to 4 hrs a night. Pt says she has had issues with this before while taking anti-depression medication. Pt is currently taking this at 8 am each morning. Pt wanting to talk to a nurse or provider to discuss this further.      Date of last appointment with provider: 10/8/24    Could we send this information to you in [employer] or would you prefer to receive a phone call?:   Patient would prefer a phone call   Okay to leave a detailed message?: Yes at Home number on file [phone] (home)

## 2024-10-14 NOTE — TELEPHONE ENCOUNTER
Called and spoke with patient.    She says after just two days on the increased dose of Sertraline, noticed a difference in her sleep patterns.    Says she can't take the hydroxyzine in combination with her sleeping medicine, but she would like to try going down to 75mg and taking it a little bit earlier in the morning.    Will call back or MyChart if still having difficulty sleeping after these changes are implemented.    Amairani Goldberg RN, BSN  -[facility]

## 2024-10-14 NOTE — TELEPHONE ENCOUNTER
I'm doubtful the change in sleep is due to the med as she had been tolerating previously.   However, she can change the timing of when she takes the sertraline (take earlier) or a small decrease in dose (trial 75 mg (50 mg + 1/2 tablet (25 ) mg daily.   Could also use the hydroxyzine to help with the insomnia

## 2024-11-19 ENCOUNTER — VIRTUAL VISIT (OUTPATIENT)
Dept: FAMILY MEDICINE | Facility: CLINIC | Age: 44
End: 2024-11-19
Payer: COMMERCIAL

## 2024-11-19 DIAGNOSIS — Z12.31 VISIT FOR SCREENING MAMMOGRAM: ICD-10-CM

## 2024-11-19 DIAGNOSIS — R09.81 NASAL CONGESTION: ICD-10-CM

## 2024-11-19 DIAGNOSIS — F32.1 MODERATE MAJOR DEPRESSION (H): ICD-10-CM

## 2024-11-19 DIAGNOSIS — F41.9 ANXIETY: ICD-10-CM

## 2024-11-19 PROCEDURE — 99442 PR PHYSICIAN TELEPHONE EVALUATION 11-20 MIN: CPT | Mod: 93 | Performed by: FAMILY MEDICINE

## 2024-11-19 RX ORDER — FLUTICASONE PROPIONATE 50 MCG
1-2 SPRAY, SUSPENSION (ML) NASAL DAILY
Qty: 48 G | Refills: 3 | Status: SHIPPED | OUTPATIENT
Start: 2024-11-19

## 2024-11-19 NOTE — PROGRESS NOTES
"  If patient has telephone visit, have they been educated on video visit as preferred visit method and offered to change to video visit? NOT APPLICABLE        Instructions Relayed to Patient by Virtual Roomer:     Patient is active on [employer]:   Relayed following to patient: \"It looks like you are active on [employer], are you able to join the visit this way? If not, do you need us to send you a link now or would you like your provider to send a link via text or email when they are ready to initiate the visit?\"      Patient Confirmed they will join visit via: Provider to call patient for telephone visit   Reminded patient to ensure they were logged on to virtual visit by arrival time listed.   Asked if patient has flexibility to initiate visit sooner than arrival time: patient is unable to initiate visit earlier than arrival time     If pediatric virtual visit, ensured pediatric patient along with parent/guardian will be present for video visit.     Patient offered the website [email or web address] and/or phone number to [employer] Help line: [phone]      Answers submitted by the patient for this visit:  Patient Health Questionnaire (Submitted on 11/18/2024)  If you checked off any problems, how difficult have these problems made it for you to do your work, take care of things at home, or get along with other people?: Extremely difficult  PHQ9 TOTAL SCORE: 18  Depression / Anxiety Questionnaire (Submitted on 11/18/2024)  Chief Complaint: Chronic problems general questions HPI Form  Depression/Anxiety: Depression  Depression only (Submitted on 11/18/2024)Jessica is a 44 year old who is being evaluated via a billable telephone visit.    What phone number would you like to be contacted at? [phone]   How would you like to obtain your AVS? [employer]  Originating Location (pt. Location): Home    Distant Location (provider location):  Off-site    Assessment & Plan     Moderate major depression " (H)  Anxiety  Side effects of insomnia with 100 and 75 mg dosing of sertraline.  She seems to be doing a bit better now back to the 50 mg dose.  We did discuss that if she would like to try increasing the dose again we could consider doing this but a much slower ramp-up given her sensitivity to the medication.  For example could increase the dose to 75 mg a few days a week alternating with 50 mg other days and slowly over time increase to 75 every day.  She will consider in the future and let me know if she ends up doing this for refill at higher dosing.  If things are remaining stable we will follow-up in 4-6 months  - sertraline (ZOLOFT) 50 MG tablet; Take 1 tablet (50 mg) by mouth daily.    Nasal congestion  Longstanding.  Helped by fluticasone spray.  Okay to increase dosing to 2 sprays as tolerated in each nares  - fluticasone (FLONASE) 50 MCG/ACT nasal spray; Spray 1-2 sprays into both nostrils daily.    Visit for screening mammogram  Has declined mammograms and other cancer screening.  She again today confirms that she would not like to complete a mammogram.  We have reviewed cancer screening recommendations and cancer risks in much greater detail at other visits                Subjective   Jessica is a 44 year old, presenting for the following health issues:  No chief complaint on file.        11/19/2024     9:39 AM   Additional Questions   Roomed by Nicci   Accompanied by self     History of Present Illness       Mental Health Follow-up:  Patient presents to follow-up on Depression.Patient's depression since last visit has been:  Medium  The patient is not having other symptoms associated with depression.      Any significant life events: financial concerns, housing concerns, health concerns and other  Patient is feeling anxious or having panic attacks.  Patient has no concerns about alcohol or drug use.    She eats 2-3 servings of fruits and vegetables daily.She consumes 0 sweetened beverage(s) daily.She  "exercises with enough effort to increase her heart rate 9 or less minutes per day.  She exercises with enough effort to increase her heart rate 3 or less days per week.   She is taking medications regularly.     Sertraline dose was increased to 100 mg for 6 days and after the 2nd day started to note insomnia in the middle of the night  Contacted the clinic and we had her drop dose to 75 mg and take early in the day. Tried this for 10 day but still have problem sleeping so went back to 50 mg.   After few weeks sleep started to get a little better.   Some flare of mood sx's a few weeks after reducing the dosage. Wonders if withdrawel.    Prior to sertraline was getting 3 hours per dose of zyrem. Once increased sertraline was only getting 2 hours per dose and early wakening.     Doing \"okay\", some days better than others  On a list for housing, likely 1-2 years out  Has phone appt next week with another housing advocate next week.     Still in counseling weekly.   Not wanting to adjust meds further     Flonase helping congestion/runny nose. Uses 1=2 sprays per day. Sometimes at higher dosing too drying.           9/9/2024     9:36 AM 10/8/2024     9:12 AM 11/18/2024     9:59 AM   PHQ-9 SCORE   PHQ-9 Total Score MyChart 22 (Severe depression)  18 (Moderately severe depression)   PHQ-9 Total Score 22 20 18        Patient-reported         5/28/2024    12:55 PM 9/9/2024     9:40 AM 10/7/2024    12:02 PM   BYRON-7 SCORE   Total Score 20 (severe anxiety) 20 (severe anxiety) 14 (moderate anxiety)   Total Score 20 20 14               Objective           Vitals:  No vitals were obtained today due to virtual visit.    Physical Exam   General: Alert and no distress //Respiratory: No audible wheeze, cough, or shortness of breath // Psychiatric:  Appropriate affect, tone, and pace of words            Phone call duration: 13 minutes  Signed Electronically by: Liberty Alvarez MD    Chief Complaint: Chronic problems general " questions HPI Form  Status since last visit:: medium  Other associated symptoms of depression:: No  Significant life event: : financial concerns, housing concerns, health concerns, other  Anxious:: Yes  Current substance use:: No  General Questionnaire (Submitted on 11/18/2024)  Chief Complaint: Chronic problems general questions HPI Form  How many servings of fruits and vegetables do you eat daily?: 2-3  On average, how many sweetened beverages do you drink each day (Examples: soda, juice, sweet tea, etc.  Do NOT count diet or artificially sweetened beverages)?: 0  How many minutes a day do you exercise enough to make your heart beat faster?: 9 or less  How many days a week do you exercise enough to make your heart beat faster?: 3 or less  How many days per week do you miss taking your medication?: 0  Questionnaire about: Chronic problems general questions HPI Form (Submitted on 11/18/2024)  Chief Complaint: Chronic problems general questions HPI Form

## 2024-12-01 ENCOUNTER — HEALTH MAINTENANCE LETTER (OUTPATIENT)
Age: 44
End: 2024-12-01

## 2024-12-03 ENCOUNTER — MYC REFILL (OUTPATIENT)
Dept: FAMILY MEDICINE | Facility: CLINIC | Age: 44
End: 2024-12-03
Payer: COMMERCIAL

## 2024-12-03 DIAGNOSIS — G47.419 NARCOLEPSY WITHOUT CATAPLEXY(347.00): ICD-10-CM

## 2024-12-03 RX ORDER — SODIUM OXYBATE 0.5 G/ML
SOLUTION ORAL
Qty: 480 ML | Refills: 5 | Status: SHIPPED | OUTPATIENT
Start: 2024-12-03

## 2024-12-11 ENCOUNTER — TELEPHONE (OUTPATIENT)
Dept: FAMILY MEDICINE | Facility: CLINIC | Age: 44
End: 2024-12-11
Payer: COMMERCIAL

## 2024-12-11 NOTE — TELEPHONE ENCOUNTER
Pharmacy calling to follow up on patient refill request. They need the last visit date. Notified last visit with Dr. Goltz with sleep medicine June 2024. Osito Kasper RN, BSN

## 2025-02-03 ENCOUNTER — TELEPHONE (OUTPATIENT)
Dept: FAMILY MEDICINE | Facility: CLINIC | Age: 45
End: 2025-02-03
Payer: COMMERCIAL

## 2025-03-11 ENCOUNTER — MYC REFILL (OUTPATIENT)
Dept: FAMILY MEDICINE | Facility: CLINIC | Age: 45
End: 2025-03-11
Payer: COMMERCIAL

## 2025-03-11 DIAGNOSIS — F41.9 ANXIETY: ICD-10-CM

## 2025-03-11 RX ORDER — HYDROXYZINE HYDROCHLORIDE 25 MG/1
25-50 TABLET, FILM COATED ORAL EVERY 8 HOURS PRN
Qty: 60 TABLET | Refills: 1 | Status: SHIPPED | OUTPATIENT
Start: 2025-03-11

## 2025-03-25 ENCOUNTER — MYC MEDICAL ADVICE (OUTPATIENT)
Dept: FAMILY MEDICINE | Facility: CLINIC | Age: 45
End: 2025-03-25
Payer: COMMERCIAL

## 2025-03-26 NOTE — TELEPHONE ENCOUNTER
Pt called in to let PCP know that she saw the [facility] msg indicating that pt needs an in person appt but pt is unable to come into clinic physically because she is currently 200 miles away working on trying to get housing and has no means of transportation to and from the clinic at the moment. Pt asking if she can do a virtual appointment. She is trying to get things in order for housing and she has no vehicle. Pt wants a call back with provider response.  Denise Dahl, CMA

## 2025-03-27 ASSESSMENT — ANXIETY QUESTIONNAIRES
IF YOU CHECKED OFF ANY PROBLEMS ON THIS QUESTIONNAIRE, HOW DIFFICULT HAVE THESE PROBLEMS MADE IT FOR YOU TO DO YOUR WORK, TAKE CARE OF THINGS AT HOME, OR GET ALONG WITH OTHER PEOPLE: EXTREMELY DIFFICULT
GAD7 TOTAL SCORE: 18
6. BECOMING EASILY ANNOYED OR IRRITABLE: NEARLY EVERY DAY
7. FEELING AFRAID AS IF SOMETHING AWFUL MIGHT HAPPEN: MORE THAN HALF THE DAYS
8. IF YOU CHECKED OFF ANY PROBLEMS, HOW DIFFICULT HAVE THESE MADE IT FOR YOU TO DO YOUR WORK, TAKE CARE OF THINGS AT HOME, OR GET ALONG WITH OTHER PEOPLE?: EXTREMELY DIFFICULT
3. WORRYING TOO MUCH ABOUT DIFFERENT THINGS: MORE THAN HALF THE DAYS
GAD7 TOTAL SCORE: 18
2. NOT BEING ABLE TO STOP OR CONTROL WORRYING: NEARLY EVERY DAY
7. FEELING AFRAID AS IF SOMETHING AWFUL MIGHT HAPPEN: MORE THAN HALF THE DAYS
1. FEELING NERVOUS, ANXIOUS, OR ON EDGE: NEARLY EVERY DAY
5. BEING SO RESTLESS THAT IT IS HARD TO SIT STILL: MORE THAN HALF THE DAYS
GAD7 TOTAL SCORE: 18
4. TROUBLE RELAXING: NEARLY EVERY DAY

## 2025-03-27 NOTE — TELEPHONE ENCOUNTER
Okay for virtual visit.  Please print out paperwork that she is wanting completed at the visit and put on my desk

## 2025-03-31 ENCOUNTER — PATIENT OUTREACH (OUTPATIENT)
Dept: CARE COORDINATION | Facility: CLINIC | Age: 45
End: 2025-03-31
Payer: COMMERCIAL

## 2025-04-01 ENCOUNTER — VIRTUAL VISIT (OUTPATIENT)
Dept: FAMILY MEDICINE | Facility: CLINIC | Age: 45
End: 2025-04-01
Payer: COMMERCIAL

## 2025-04-01 DIAGNOSIS — F41.9 ANXIETY: ICD-10-CM

## 2025-04-01 DIAGNOSIS — R53.83 FATIGUE, UNSPECIFIED TYPE: ICD-10-CM

## 2025-04-01 DIAGNOSIS — F33.1 MAJOR DEPRESSIVE DISORDER, RECURRENT, MODERATE (H): ICD-10-CM

## 2025-04-01 DIAGNOSIS — G47.419 NARCOLEPSY WITHOUT CATAPLEXY(347.00): Primary | ICD-10-CM

## 2025-04-01 DIAGNOSIS — R09.81 NASAL CONGESTION: ICD-10-CM

## 2025-04-01 PROCEDURE — 98006 SYNCH AUDIO-VIDEO EST MOD 30: CPT | Performed by: FAMILY MEDICINE

## 2025-04-01 RX ORDER — FLUTICASONE PROPIONATE 50 MCG
1-2 SPRAY, SUSPENSION (ML) NASAL DAILY
Qty: 48 G | Refills: 3 | Status: SHIPPED | OUTPATIENT
Start: 2025-04-01

## 2025-04-01 NOTE — PROGRESS NOTES
"  If patient has telephone visit, have they been educated on video visit as preferred visit method and offered to change to video visit? N/A        Instructions Relayed to Patient by Virtual Roomer:     Patient is active on [employer]:   Relayed following to patient: \"It looks like you are active on [employer], are you able to join the visit this way? If not, do you need us to send you a link now or would you like your provider to send a link via text or email when they are ready to initiate the visit?\"      Patient Confirmed they will join visit via: [employer]  Reminded patient to ensure they were logged on to virtual visit by arrival time listed.   Asked if patient has flexibility to initiate visit sooner than arrival time: patient is unable to initiate visit earlier than arrival time     If pediatric virtual visit, ensured pediatric patient along with parent/guardian will be present for video visit.     Patient offered the website [email or web address] and/or phone number to [employer] Help line: [phone]      Jessica is a 44 year old who is being evaluated via a billable video visit.    How would you like to obtain your AVS? [employer]  If the video visit is dropped, the invitation should be resent by: Text to cell phone: [phone]  Will anyone else be joining your video visit? No      Assessment & Plan     Narcolepsy without cataplexy(347.00)  Fatigue, unspecified type  Continued ongoing severe fatigue and insomnia.  Likely multifactorial including her narcolepsy, situational stress, depression and anxiety.  She is working with a cognitive behavioral therapist weekly and has continued on sertraline 50 mg in the oxybate from the sleep clinic.  I will complete [place] Department of human services forms that she had sent to the office.  Given current symptoms it does not seem that she would be able to return to work in the foreseeable future although this may be an option in the future so we will " continue to evaluate.    Anxiety  Major depressive disorder, recurrent, moderate (H)  Not well-controlled.  Significant situational stress.  Reports she is currently safe and nobody is harming her.  Will continue sertraline 50 mg.  Hydroxyzine as needed for panic.  She does not want to change medications or see psychiatry as she is struggled with medication side effects from many trials and poor interactions with psychiatrist in the past.    Nasal congestion  Flonase refilled.      Follow-up in 3 months and as needed.    The longitudinal plan of care for the diagnosis(es)/condition(s) as documented were addressed during this visit. Due to the added complexity in care, I will continue to support Jessica in the subsequent management and with ongoing continuity of care.          Subjective   Jessica is a 44 year old, presenting for the following health issues:  RECHECK (Mental health Check in as well as forms to be filled out )        4/1/2025     6:58 AM   Additional Questions   Roomed by Tisha GOMEZ   Accompanied by Self         4/1/2025   Forms   Any forms needing to be completed Yes     Video Start Time: 7:34 AM    History of Present Illness       Mental Health Follow-up:  Patient presents to follow-up on Depression & Anxiety.Patient's depression since last visit has been:  Better  The patient is not having other symptoms associated with depression.  Patient's anxiety since last visit has been:  Bad  The patient is not having other symptoms associated with anxiety.  Any significant life events: housing concerns and health concerns  Patient is feeling anxious or having panic attacks.  Patient has no concerns about alcohol or drug use.    Reason for visit:  Ongoing symptoms and medical verification    She eats 0-1 servings of fruits and vegetables daily.She consumes 0 sweetened beverage(s) daily.She exercises with enough effort to increase her heart rate 9 or less minutes per day.  She exercises with enough effort to  increase her heart rate 3 or less days per week.   She is taking medications regularly.   - currently staying at her grandfather's house in partdue to Her grandfather's caregiver absence And her lack of housing options  - Experiencing increased anxiety, sleep disturbances, Depressionand fatigue.  - Seeing a CBT insomnia therapist but reports limited progress due to lack of control over her schedule. Seeing therapist weekly  - Currently taking 50 mg of sertraline; attempted to increase to 75 mg but experienced increased side effects and reduced back to 50 mg.  - Consistently taking oxybate for narcolepsy.  - Discontinued Adderall due to worsening anxiety and negative side effects.  - Reports difficulty maintaining a decent diet due to unsanitary conditions and lack of food storage.  - Experiencing panic attacks and taking hydroxyzine for panic.  - Unable to work due to housing situation, mental health issues, Poor sleepand exhaustion.  - Expresses desire to work part-time once housing situation improves if she can get her mental health under better control.    Previous medication trials:  - Sertraline: Currently taking 50 mg; attempted to increase to 75 mg but experienced increased side effects.  - Adderall: Discontinued due to worsening anxiety and negative side effects.    Review of systems:  - General: Severe fatigue, exhaustion.  - Psychiatric: Anxiety, depression, panic attacks.  - Neurological: Sleep disturbance.  - Musculoskeletal: Shoulder pain.  - Respiratory: Nasal congestion.  - Gastrointestinal: Difficulty maintaining a decent diet.            10/8/2024     9:12 AM 11/18/2024     9:59 AM 4/1/2025     6:57 AM   PHQ-9 SCORE   PHQ-9 Total Score MyChart  18 (Moderately severe depression) 19 (Moderately severe depression)   PHQ-9 Total Score 20 18  19        Patient-reported    Proxy-reported         9/9/2024     9:40 AM 10/7/2024    12:02 PM 3/27/2025    10:15 AM   BYRON-7 SCORE   Total Score 20 (severe  anxiety) 14 (moderate anxiety) 18 (severe anxiety)   Total Score 20 14 18        Patient-reported               Objective           Vitals:  No vitals were obtained today due to virtual visit.    Physical Exam   GENERAL: alert and no distress  EYES: Eyes grossly normal to inspection.  No discharge or erythema, or obvious scleral/conjunctival abnormalities.  RESP: No audible wheeze, cough, or visible cyanosis.    SKIN: Visible skin clear. No significant rash, abnormal pigmentation or lesions.  NEURO: Cranial nerves grossly intact.  Mentation and speech appropriate for age.  PSYCH:flat affect, speech is goal directed but slightly slow, mood is depressed          Video-Visit Details    Type of service:  Video Visit   Video End Time:7:49 AM  Originating Location (pt. Location): Home    Distant Location (provider location):  Off-site  Platform used for Video Visit: [employer]  Signed Electronically by: Liberty Alvarez MD

## 2025-04-02 NOTE — TELEPHONE ENCOUNTER
Paperwork completed.  Please fax to [facility] of human services, fax November on front page of forms and let patient know once completed.

## 2025-04-15 ENCOUNTER — TELEPHONE (OUTPATIENT)
Dept: SLEEP MEDICINE | Facility: CLINIC | Age: 45
End: 2025-04-15
Payer: COMMERCIAL

## 2025-04-16 ENCOUNTER — MYC MEDICAL ADVICE (OUTPATIENT)
Dept: FAMILY MEDICINE | Facility: CLINIC | Age: 45
End: 2025-04-16
Payer: COMMERCIAL

## 2025-04-16 DIAGNOSIS — L81.9 PIGMENTED SKIN LESION OF UNCERTAIN NATURE: Primary | ICD-10-CM

## 2025-04-16 NOTE — TELEPHONE ENCOUNTER
Routing to provider, patient was seen for an unrelated problem on 4/1/25, there is a previous referral in her chart from 2023. Please advise, does patient need appointment for referral? Osito Kasper, RN, BSN, PHN

## 2025-05-07 ENCOUNTER — VIRTUAL VISIT (OUTPATIENT)
Dept: FAMILY MEDICINE | Facility: CLINIC | Age: 45
End: 2025-05-07
Payer: COMMERCIAL

## 2025-05-07 DIAGNOSIS — F33.1 MAJOR DEPRESSIVE DISORDER, RECURRENT, MODERATE (H): ICD-10-CM

## 2025-05-07 DIAGNOSIS — G47.419 NARCOLEPSY WITHOUT CATAPLEXY(347.00): ICD-10-CM

## 2025-05-07 DIAGNOSIS — F41.9 ANXIETY: Primary | ICD-10-CM

## 2025-05-07 PROCEDURE — 98006 SYNCH AUDIO-VIDEO EST MOD 30: CPT | Performed by: FAMILY MEDICINE

## 2025-05-07 ASSESSMENT — ANXIETY QUESTIONNAIRES
6. BECOMING EASILY ANNOYED OR IRRITABLE: SEVERAL DAYS
4. TROUBLE RELAXING: NEARLY EVERY DAY
3. WORRYING TOO MUCH ABOUT DIFFERENT THINGS: NEARLY EVERY DAY
IF YOU CHECKED OFF ANY PROBLEMS ON THIS QUESTIONNAIRE, HOW DIFFICULT HAVE THESE PROBLEMS MADE IT FOR YOU TO DO YOUR WORK, TAKE CARE OF THINGS AT HOME, OR GET ALONG WITH OTHER PEOPLE: EXTREMELY DIFFICULT
7. FEELING AFRAID AS IF SOMETHING AWFUL MIGHT HAPPEN: NEARLY EVERY DAY
7. FEELING AFRAID AS IF SOMETHING AWFUL MIGHT HAPPEN: NEARLY EVERY DAY
1. FEELING NERVOUS, ANXIOUS, OR ON EDGE: NEARLY EVERY DAY
2. NOT BEING ABLE TO STOP OR CONTROL WORRYING: NEARLY EVERY DAY
5. BEING SO RESTLESS THAT IT IS HARD TO SIT STILL: NEARLY EVERY DAY
GAD7 TOTAL SCORE: 19
GAD7 TOTAL SCORE: 19
8. IF YOU CHECKED OFF ANY PROBLEMS, HOW DIFFICULT HAVE THESE MADE IT FOR YOU TO DO YOUR WORK, TAKE CARE OF THINGS AT HOME, OR GET ALONG WITH OTHER PEOPLE?: EXTREMELY DIFFICULT
GAD7 TOTAL SCORE: 19

## 2025-05-07 ASSESSMENT — PATIENT HEALTH QUESTIONNAIRE - PHQ9
10. IF YOU CHECKED OFF ANY PROBLEMS, HOW DIFFICULT HAVE THESE PROBLEMS MADE IT FOR YOU TO DO YOUR WORK, TAKE CARE OF THINGS AT HOME, OR GET ALONG WITH OTHER PEOPLE: EXTREMELY DIFFICULT
SUM OF ALL RESPONSES TO PHQ QUESTIONS 1-9: 20
SUM OF ALL RESPONSES TO PHQ QUESTIONS 1-9: 20

## 2025-05-07 NOTE — PROGRESS NOTES
Jessica is a 44 year old who is being evaluated via a billable video visit.    How would you like to obtain your AVS? MyChart  If the video visit is dropped, the invitation should be resent by: Text to cell phone: [phone]  Will anyone else be joining your video visit? No      Assessment & Plan     Anxiety   Major depressive disorder, recurrent, moderate (H)  flare of anxiety right now due to the stress of moving.  Does not want to make any medication adjustments.  Will continue sertraline 50 mg daily.  She is continuing to talk with her therapist weekly.  We discussed that I will not be able to do any visits with her outside of [place] but I can send a few refills of sertraline in for her until she finds a new provider in [place].    Narcolepsy without cataplexy(347.00)  Continues on oxybate.  Does have a sleep provider set up in [place] that she will establish care with.        Follow-up with new provider in [place] and as needed prior to her travels.        The longitudinal plan of care for the diagnosis(es)/condition(s) as documented were addressed during this visit. Due to the added complexity in care, I will continue to support Jessica in the subsequent management and with ongoing continuity of care.    Subjective   Jessica is a 44 year old, presenting for the following health issues:  Follow Up (Medication )      5/7/2025     4:38 PM   Additional Questions   Roomed by SHAD Vf   Accompanied by Self         5/7/2025     4:38 PM   Patient Reported Additional Medications   Patient reports taking the following new medications None     Video Start Time: 5: 56 PM    History of Present Illness       Mental Health Follow-up:  Patient presents to follow-up on Depression & Anxiety.Patient's depression since last visit has been:  Medium  The patient is not having other symptoms associated with depression.  Patient's anxiety since last visit has been:  Worse  The patient is having other symptoms associated with  "anxiety.  Any significant life events: financial concerns, housing concerns, grief or loss and health concerns  Patient is feeling anxious or having panic attacks.  Patient has no concerns about alcohol or drug use.    She eats 0-1 servings of fruits and vegetables daily.She consumes 0 sweetened beverage(s) daily.She exercises with enough effort to increase her heart rate 9 or less minutes per day.  She exercises with enough effort to increase her heart rate 7 days per week.   She is taking medications regularly.      Going to be leaving [place] at the end of the month.   Moving to [place]  Used to live there in past and has friends there.   Moved into a [facility] in [place] in April but \"it's not suitable\".  Has loud neighbors at night and landlord not taking care of issues.     Continue to sleep a lot since not sleeping well at night due to the noisy neigbor  Waiting to get approved for a place.   Anxiety is up with this and some bad days. Feel like \"I'm breaking down\". Brain is feeling overwhelmed.   Can't take the hydroxyzine with the sodium oxybate.  Uses during the day for naps.     Still see therapist once a week.   Has appt with sleep provider when she gets there.                     Objective    Vitals - Patient Reported  Pain Score: Mild Pain (3)  Pain Loc: Shoulder        Physical Exam   GENERAL: alert and no distress  EYES: Eyes grossly normal to inspection.  No discharge or erythema, or obvious scleral/conjunctival abnormalities.  RESP: No audible wheeze, cough, or visible cyanosis.    SKIN: Visible skin clear. No significant rash, abnormal pigmentation or lesions.  NEURO: Cranial nerves grossly intact.  Mentation and speech appropriate for age.  PSYCH: mentation appears normal, affect flat, judgement and insight intact, and appearance well groomed          Video-Visit Details    Type of service:  Video Visit   Video End Time:6:07 PM  Originating Location (pt. Location): Home    Distant Location (provider location):  " On-site  Platform used for Video Visit: [employer]  Signed Electronically by: Liberty Alvarez MD

## 2025-06-06 ENCOUNTER — TELEPHONE (OUTPATIENT)
Dept: SLEEP MEDICINE | Facility: CLINIC | Age: 45
End: 2025-06-06
Payer: COMMERCIAL

## 2025-06-06 NOTE — TELEPHONE ENCOUNTER
Patient wants refill of medication for the month of June. Has supplies for July and then will  be transferring care in the future, but wants refill only for month of June.

## 2025-06-16 ENCOUNTER — TELEPHONE (OUTPATIENT)
Dept: SLEEP MEDICINE | Facility: CLINIC | Age: 45
End: 2025-06-16
Payer: COMMERCIAL

## 2025-06-16 NOTE — TELEPHONE ENCOUNTER
Pt cancelled upcoming appt with Bennett Goltz due to [place] out of state.    Monique Maynard    [facility]